# Patient Record
Sex: MALE | Race: WHITE | NOT HISPANIC OR LATINO | Employment: FULL TIME | URBAN - METROPOLITAN AREA
[De-identification: names, ages, dates, MRNs, and addresses within clinical notes are randomized per-mention and may not be internally consistent; named-entity substitution may affect disease eponyms.]

---

## 2017-02-03 ENCOUNTER — APPOINTMENT (OUTPATIENT)
Dept: LAB | Facility: CLINIC | Age: 52
End: 2017-02-03
Payer: COMMERCIAL

## 2017-02-03 ENCOUNTER — TRANSCRIBE ORDERS (OUTPATIENT)
Dept: LAB | Facility: CLINIC | Age: 52
End: 2017-02-03

## 2017-02-03 DIAGNOSIS — IMO0001 MODY 4, UNCOMPLICATED, UNCONTROLLED: Primary | ICD-10-CM

## 2017-02-03 DIAGNOSIS — Z00.00 ROUTINE GENERAL MEDICAL EXAMINATION AT A HEALTH CARE FACILITY: ICD-10-CM

## 2017-02-03 DIAGNOSIS — I10 ESSENTIAL HYPERTENSION, BENIGN: ICD-10-CM

## 2017-02-03 DIAGNOSIS — E78.5 HYPERLIPIDEMIA, UNSPECIFIED HYPERLIPIDEMIA TYPE: ICD-10-CM

## 2017-02-03 LAB
ALBUMIN SERPL BCP-MCNC: 4 G/DL (ref 3.5–5)
ALP SERPL-CCNC: 81 U/L (ref 46–116)
ALT SERPL W P-5'-P-CCNC: 54 U/L (ref 12–78)
ANION GAP SERPL CALCULATED.3IONS-SCNC: 8 MMOL/L (ref 4–13)
AST SERPL W P-5'-P-CCNC: 20 U/L (ref 5–45)
BILIRUB SERPL-MCNC: 1.07 MG/DL (ref 0.2–1)
BUN SERPL-MCNC: 12 MG/DL (ref 5–25)
CALCIUM SERPL-MCNC: 9.1 MG/DL (ref 8.3–10.1)
CHLORIDE SERPL-SCNC: 104 MMOL/L (ref 100–108)
CHOLEST SERPL-MCNC: 167 MG/DL (ref 50–200)
CO2 SERPL-SCNC: 30 MMOL/L (ref 21–32)
CREAT SERPL-MCNC: 1.09 MG/DL (ref 0.6–1.3)
EST. AVERAGE GLUCOSE BLD GHB EST-MCNC: 166 MG/DL
GFR SERPL CREATININE-BSD FRML MDRD: >60 ML/MIN/1.73SQ M
GLUCOSE SERPL-MCNC: 238 MG/DL (ref 65–140)
HBA1C MFR BLD: 7.4 % (ref 4.2–6.3)
HDLC SERPL-MCNC: 43 MG/DL (ref 40–60)
LDLC SERPL CALC-MCNC: 97 MG/DL (ref 0–100)
POTASSIUM SERPL-SCNC: 4.5 MMOL/L (ref 3.5–5.3)
PROT SERPL-MCNC: 7.5 G/DL (ref 6.4–8.2)
PSA SERPL-MCNC: 1.6 NG/ML (ref 0–4)
SODIUM SERPL-SCNC: 142 MMOL/L (ref 136–145)
TRIGL SERPL-MCNC: 137 MG/DL

## 2017-02-03 PROCEDURE — 83036 HEMOGLOBIN GLYCOSYLATED A1C: CPT

## 2017-02-03 PROCEDURE — 80053 COMPREHEN METABOLIC PANEL: CPT

## 2017-02-03 PROCEDURE — 36415 COLL VENOUS BLD VENIPUNCTURE: CPT

## 2017-02-03 PROCEDURE — 80061 LIPID PANEL: CPT

## 2017-02-03 PROCEDURE — G0103 PSA SCREENING: HCPCS

## 2017-05-23 ENCOUNTER — HOSPITAL ENCOUNTER (OUTPATIENT)
Dept: RADIOLOGY | Facility: CLINIC | Age: 52
Discharge: HOME | End: 2017-05-23
Payer: COMMERCIAL

## 2017-05-23 ENCOUNTER — TRANSCRIBE ORDERS (OUTPATIENT)
Dept: URGENT CARE | Facility: CLINIC | Age: 52
End: 2017-05-23

## 2017-05-23 DIAGNOSIS — M25.571 PAIN IN RIGHT ANKLE: ICD-10-CM

## 2017-05-23 DIAGNOSIS — M79.671 PAIN IN RIGHT FOOT: ICD-10-CM

## 2017-05-23 DIAGNOSIS — S99.911A ANKLE INJURIES, RIGHT, INITIAL ENCOUNTER: Primary | ICD-10-CM

## 2017-05-23 PROCEDURE — 73610 X-RAY EXAM OF ANKLE: CPT

## 2017-05-23 PROCEDURE — 73630 X-RAY EXAM OF FOOT: CPT

## 2020-05-12 LAB — HBA1C MFR BLD HPLC: 11.4 %

## 2020-06-16 ENCOUNTER — TELEPHONE (OUTPATIENT)
Dept: ENDOCRINOLOGY | Facility: CLINIC | Age: 55
End: 2020-06-16

## 2020-06-17 ENCOUNTER — TELEPHONE (OUTPATIENT)
Dept: ENDOCRINOLOGY | Facility: CLINIC | Age: 55
End: 2020-06-17

## 2020-06-17 ENCOUNTER — OFFICE VISIT (OUTPATIENT)
Dept: ENDOCRINOLOGY | Facility: CLINIC | Age: 55
End: 2020-06-17
Payer: COMMERCIAL

## 2020-06-17 VITALS
HEART RATE: 92 BPM | HEIGHT: 71 IN | SYSTOLIC BLOOD PRESSURE: 140 MMHG | WEIGHT: 226 LBS | TEMPERATURE: 98 F | DIASTOLIC BLOOD PRESSURE: 70 MMHG | BODY MASS INDEX: 31.64 KG/M2

## 2020-06-17 DIAGNOSIS — E11.65 TYPE 2 DIABETES MELLITUS WITH HYPERGLYCEMIA, WITHOUT LONG-TERM CURRENT USE OF INSULIN (HCC): Primary | ICD-10-CM

## 2020-06-17 DIAGNOSIS — E11.65 TYPE 2 DIABETES MELLITUS WITH HYPERGLYCEMIA, WITHOUT LONG-TERM CURRENT USE OF INSULIN (HCC): ICD-10-CM

## 2020-06-17 DIAGNOSIS — E11.65 TYPE 2 DIABETES MELLITUS WITH HYPERGLYCEMIA, WITH LONG-TERM CURRENT USE OF INSULIN (HCC): ICD-10-CM

## 2020-06-17 DIAGNOSIS — E78.2 MIXED HYPERLIPIDEMIA: ICD-10-CM

## 2020-06-17 DIAGNOSIS — E78.2 MIXED HYPERLIPIDEMIA: Primary | ICD-10-CM

## 2020-06-17 DIAGNOSIS — I10 HYPERTENSION GOAL BP (BLOOD PRESSURE) < 140/90: ICD-10-CM

## 2020-06-17 DIAGNOSIS — Z79.4 TYPE 2 DIABETES MELLITUS WITH HYPERGLYCEMIA, WITH LONG-TERM CURRENT USE OF INSULIN (HCC): ICD-10-CM

## 2020-06-17 LAB
SL AMB POCT GLUCOSE BLD: 330
SL AMB POCT HEMOGLOBIN AIC: 330 (ref ?–6.5)

## 2020-06-17 PROCEDURE — 99204 OFFICE O/P NEW MOD 45 MIN: CPT | Performed by: INTERNAL MEDICINE

## 2020-06-17 PROCEDURE — 82948 REAGENT STRIP/BLOOD GLUCOSE: CPT | Performed by: INTERNAL MEDICINE

## 2020-06-17 PROCEDURE — 83036 HEMOGLOBIN GLYCOSYLATED A1C: CPT | Performed by: INTERNAL MEDICINE

## 2020-06-17 RX ORDER — LORATADINE 10 MG/1
10 TABLET ORAL DAILY
COMMUNITY

## 2020-06-17 RX ORDER — GLIMEPIRIDE 4 MG/1
4 TABLET ORAL
Qty: 90 TABLET | Refills: 3 | Status: SHIPPED | OUTPATIENT
Start: 2020-06-17 | End: 2021-03-31

## 2020-06-17 RX ORDER — ALPRAZOLAM 0.5 MG/1
0.5 TABLET, EXTENDED RELEASE ORAL DAILY
Qty: 30 TABLET | Refills: 0 | Status: CANCELLED
Start: 2020-06-17

## 2020-06-17 RX ORDER — LISINOPRIL AND HYDROCHLOROTHIAZIDE 12.5; 1 MG/1; MG/1
1 TABLET ORAL DAILY
COMMUNITY

## 2020-06-17 RX ORDER — FAMOTIDINE 20 MG/1
20 TABLET, FILM COATED ORAL AS NEEDED
COMMUNITY

## 2020-06-17 RX ORDER — ROSUVASTATIN CALCIUM 10 MG/1
10 TABLET, COATED ORAL DAILY
COMMUNITY

## 2020-06-17 RX ORDER — PIOGLITAZONE AND GLIMEPIRIDE 30; 4 MG/1; MG/1
1 TABLET ORAL DAILY
COMMUNITY
End: 2020-06-17

## 2020-06-30 ENCOUNTER — TELEPHONE (OUTPATIENT)
Dept: ENDOCRINOLOGY | Facility: CLINIC | Age: 55
End: 2020-06-30

## 2020-07-06 ENCOUNTER — TELEPHONE (OUTPATIENT)
Dept: ENDOCRINOLOGY | Facility: CLINIC | Age: 55
End: 2020-07-06

## 2020-07-06 DIAGNOSIS — E11.65 TYPE 2 DIABETES MELLITUS WITH HYPERGLYCEMIA, WITH LONG-TERM CURRENT USE OF INSULIN (HCC): ICD-10-CM

## 2020-07-06 DIAGNOSIS — E11.9 TYPE 2 DIABETES MELLITUS WITHOUT COMPLICATION, WITHOUT LONG-TERM CURRENT USE OF INSULIN (HCC): Primary | ICD-10-CM

## 2020-07-06 DIAGNOSIS — Z79.4 TYPE 2 DIABETES MELLITUS WITH HYPERGLYCEMIA, WITH LONG-TERM CURRENT USE OF INSULIN (HCC): ICD-10-CM

## 2020-07-06 RX ORDER — LANCETS
EACH MISCELLANEOUS
Qty: 102 EACH | Refills: 3 | Status: SHIPPED | OUTPATIENT
Start: 2020-07-06 | End: 2020-07-30 | Stop reason: SDUPTHER

## 2020-07-06 RX ORDER — LANCETS
EACH MISCELLANEOUS
Qty: 102 EACH | Refills: 3 | Status: SHIPPED | OUTPATIENT
Start: 2020-07-06 | End: 2020-07-06 | Stop reason: SDUPTHER

## 2020-07-06 NOTE — TELEPHONE ENCOUNTER
[employer] sent a list of drugs to the patient that would be covered in place of Ozempic:    Metformin, metformin ER, glipizide-metformin, glyburide-metformin, pioglitazone-metformin

## 2020-07-07 ENCOUNTER — TELEPHONE (OUTPATIENT)
Dept: ENDOCRINOLOGY | Facility: CLINIC | Age: 55
End: 2020-07-07

## 2020-07-08 ENCOUNTER — TELEPHONE (OUTPATIENT)
Dept: ENDOCRINOLOGY | Facility: CLINIC | Age: 55
End: 2020-07-08

## 2020-07-08 NOTE — TELEPHONE ENCOUNTER
Received fax from [address] [place] [address] has been denied  (placed letter on Dr Shiva Rodriguez desk)  Letter states pt has to have tried or can not try the following medications:    Metformin  Metformin extended release  Glipizide-Metformin  Glyburide-Metformin  Pioglitazone-Metformin      Called pt to let him know the above, he stated he received the notification about the denial, advised I will let Dr Chirag Sage know  He stated that he received his mail order of Ozempic on 7/8/20 for a 3 month supply  He wants to know if Dr Chirag Sage wants him to finish the Ozempic before starting on any of the above medications?

## 2020-07-08 NOTE — TELEPHONE ENCOUNTER
Called and left pt message to finish the Ozempic, when he is out to call and let Dr Javier Vilchis know

## 2020-07-15 ENCOUNTER — TELEPHONE (OUTPATIENT)
Dept: ENDOCRINOLOGY | Facility: CLINIC | Age: 55
End: 2020-07-15

## 2020-07-15 DIAGNOSIS — E11.65 TYPE 2 DIABETES MELLITUS WITH HYPERGLYCEMIA, WITH LONG-TERM CURRENT USE OF INSULIN (HCC): ICD-10-CM

## 2020-07-15 DIAGNOSIS — Z79.4 TYPE 2 DIABETES MELLITUS WITH HYPERGLYCEMIA, WITH LONG-TERM CURRENT USE OF INSULIN (HCC): ICD-10-CM

## 2020-07-15 DIAGNOSIS — E11.65 TYPE 2 DIABETES MELLITUS WITH HYPERGLYCEMIA, WITH LONG-TERM CURRENT USE OF INSULIN (HCC): Primary | ICD-10-CM

## 2020-07-15 DIAGNOSIS — Z79.4 TYPE 2 DIABETES MELLITUS WITH HYPERGLYCEMIA, WITH LONG-TERM CURRENT USE OF INSULIN (HCC): Primary | ICD-10-CM

## 2020-07-15 NOTE — TELEPHONE ENCOUNTER
Please let patient know I reviewed BG log    -BGs look much better    Occasionally having a high reading in the morning     Increase lantus to 22 units at bedtime, continue glimepiride 4mg once a day and Ozempic 0 5mg weekly     I ordered labs to be done a few days before his next appointment   He had slightly elevated liver enzymes in May 2020-- will repeat this, if they are normal now will start metformin at his next appointment which will help with the morning blood sugar reading as well as allow for the Ozempic to be covered in the future if his A1C is not at goal

## 2020-07-23 LAB
CREAT ?TM UR-SCNC: 239.6 UMOL/L
EXT MICROALBUMIN URINE RANDOM: 34.3
MICROALBUMIN/CREAT UR: 14 MG/G{CREAT}

## 2020-07-25 LAB
ALBUMIN SERPL-MCNC: 4.7 G/DL (ref 3.8–4.9)
ALBUMIN/CREAT UR: 14 MG/G CREAT (ref 0–29)
ALBUMIN/GLOB SERPL: 2.5 {RATIO} (ref 1.2–2.2)
ALP SERPL-CCNC: 76 IU/L (ref 39–117)
ALT SERPL-CCNC: 117 IU/L (ref 0–44)
AST SERPL-CCNC: 73 IU/L (ref 0–40)
BILIRUB SERPL-MCNC: 1.4 MG/DL (ref 0–1.2)
BUN SERPL-MCNC: 11 MG/DL (ref 6–24)
BUN/CREAT SERPL: 11 (ref 9–20)
CALCIUM SERPL-MCNC: 9.5 MG/DL (ref 8.7–10.2)
CHLORIDE SERPL-SCNC: 97 MMOL/L (ref 96–106)
CHOLEST SERPL-MCNC: 137 MG/DL (ref 100–199)
CO2 SERPL-SCNC: 23 MMOL/L (ref 20–29)
CREAT SERPL-MCNC: 0.97 MG/DL (ref 0.76–1.27)
CREAT UR-MCNC: 239.6 MG/DL
GLOBULIN SER-MCNC: 1.9 G/DL (ref 1.5–4.5)
GLUCOSE SERPL-MCNC: 219 MG/DL (ref 65–99)
HDLC SERPL-MCNC: 42 MG/DL
LDLC SERPL CALC-MCNC: 75 MG/DL (ref 0–99)
MICROALBUMIN UR-MCNC: 34.3 UG/ML
POTASSIUM SERPL-SCNC: 4.6 MMOL/L (ref 3.5–5.2)
PROT SERPL-MCNC: 6.6 G/DL (ref 6–8.5)
SL AMB EGFR AFRICAN AMERICAN: 103 ML/MIN/1.73
SL AMB EGFR NON AFRICAN AMERICAN: 89 ML/MIN/1.73
SL AMB VLDL CHOLESTEROL CALC: 20 MG/DL (ref 5–40)
SODIUM SERPL-SCNC: 138 MMOL/L (ref 134–144)
TRIGL SERPL-MCNC: 99 MG/DL (ref 0–149)

## 2020-07-29 ENCOUNTER — OFFICE VISIT (OUTPATIENT)
Dept: ENDOCRINOLOGY | Facility: CLINIC | Age: 55
End: 2020-07-29
Payer: COMMERCIAL

## 2020-07-29 VITALS
HEART RATE: 79 BPM | WEIGHT: 224 LBS | BODY MASS INDEX: 31.36 KG/M2 | TEMPERATURE: 98 F | HEIGHT: 71 IN | DIASTOLIC BLOOD PRESSURE: 78 MMHG | SYSTOLIC BLOOD PRESSURE: 140 MMHG

## 2020-07-29 DIAGNOSIS — I10 HYPERTENSION GOAL BP (BLOOD PRESSURE) < 140/90: ICD-10-CM

## 2020-07-29 DIAGNOSIS — Z79.4 TYPE 2 DIABETES MELLITUS WITH HYPERGLYCEMIA, WITH LONG-TERM CURRENT USE OF INSULIN (HCC): Primary | ICD-10-CM

## 2020-07-29 DIAGNOSIS — E11.65 TYPE 2 DIABETES MELLITUS WITH HYPERGLYCEMIA, WITH LONG-TERM CURRENT USE OF INSULIN (HCC): Primary | ICD-10-CM

## 2020-07-29 DIAGNOSIS — R79.89 ELEVATED LFTS: ICD-10-CM

## 2020-07-29 DIAGNOSIS — E78.2 MIXED HYPERLIPIDEMIA: ICD-10-CM

## 2020-07-29 PROCEDURE — 99214 OFFICE O/P EST MOD 30 MIN: CPT | Performed by: INTERNAL MEDICINE

## 2020-07-29 NOTE — PROGRESS NOTES
ENDOCRINOLOGY  FOLLOW UP VISIT      Reason for Endocrine Consult/Chief Complaint: DM management     Referring Provider: Si Sandifer, MD        ?  Medical Decision Making:     Impression  1  Type 2 Diabetes uncontrolled  2  HTN  3  HLD mixed  4  Elevated LFTs         Recommendations:  ?  Reported BGs have improved significantly, still having some fasting hypergylcemia, will increase lantus to 25 units qHS, continue glimepiride 4mg qday and Ozempic 0 5mg weekly  Insurance will cover Ozempic if he is on metformin as well  Once GI sees him for the elevated LFTs and if they are okay with starting it will prescribe low dose metformin        HTN- controlled on ACEi-HCTZ continue     HLD- LDL improved to 75 July 2020, continue crestor 10mg every other day    Elevated LFTs-  persistent AST 73 and  July 2020--will refer to GI for further evaluation before starting metformin     RTC 3 months   [facility]           History of Present Illness:   Mr Miki Bennett is a 49year old male who presents for diabetes evaluation-follow up      [facility] was managing diabetes before this        ?  PMH-DM2, HTN, HLD   PSH-appendectomy   FHx-mother with diabetes and MGM with diabetes  SHx-prior smoker, neg x 2, works [employer]  (3pm-11PM shift)     Type of DM: 2  Age of onset: 5 years ago   Microvascular complications: none known   Macrovascular complications:none known       Events since last visit:   on lantus 22 units qHS, glimepiride 4mg qday and Ozempic 0 5mg weekly on fridays     Forgot meter today    FBG-150-180 reported  Premeal/qHS BG-high 100s   Hypoglycemia- none   ? Review of Systems:     Review of Systems   Constitutional: Negative for appetite change, chills, diaphoresis, fatigue, fever and unexpected weight change  HENT: Negative for congestion, ear pain, hearing loss, rhinorrhea, sinus pressure, sinus pain, sore throat, trouble swallowing and voice change      Eyes: Negative for photophobia, redness and visual disturbance  Respiratory: Negative for apnea, cough, chest tightness, shortness of breath, wheezing and stridor  Cardiovascular: Negative for chest pain, palpitations and leg swelling  Gastrointestinal: Negative for abdominal distention, abdominal pain, constipation, diarrhea, nausea and vomiting  Endocrine: Negative for cold intolerance, heat intolerance, polydipsia, polyphagia and polyuria  Genitourinary: Negative for difficulty urinating, dysuria, flank pain, frequency, hematuria and urgency  Musculoskeletal: Negative for arthralgias, back pain, gait problem, joint swelling and myalgias  Skin: Negative for color change, pallor, rash and wound  Allergic/Immunologic: Negative for immunocompromised state  Neurological: Negative for dizziness, tremors, syncope, weakness, light-headedness and headaches  Hematological: Negative for adenopathy  Does not bruise/bleed easily  Psychiatric/Behavioral: Negative for confusion and sleep disturbance  The patient is not nervous/anxious  ? Patient History:     Past Medical History:   Diagnosis Date    Diabetes type 2, uncontrolled ([facility] )      History reviewed  No pertinent surgical history    Social History     Socioeconomic History    Marital status:      Spouse name: Not on file    Number of children: Not on file    Years of education: Not on file    Highest education level: Not on file   Occupational History    Not on file   Social Needs    Financial resource strain: Not on file    Food insecurity:     Worry: Not on file     Inability: Not on file    Transportation needs:     Medical: Not on file     Non-medical: Not on file   Tobacco Use    Smoking status: Former Smoker    Smokeless tobacco: Never Used   Substance and Sexual Activity    Alcohol use: Yes     Comment: socially    Drug use: Never    Sexual activity: Not on file   Lifestyle    Physical activity:     Days per week: Not on file Minutes per session: Not on file    Stress: Not on file   Relationships    Social connections:     Talks on phone: Not on file     Gets together: Not on file     Attends [employer] service: Not on file     Active member of club or organization: Not on file     Attends meetings of clubs or organizations: Not on file     Relationship status: Not on file    Intimate partner violence:     Fear of current or ex partner: Not on file     Emotionally abused: Not on file     Physically abused: Not on file     Forced sexual activity: Not on file   Other Topics Concern    Not on file   Social History Narrative    Not on file     Family History   Problem Relation Age of Onset    Diabetes type II Mother     Hypertension Father     Diabetes type II Sister        Current Medications: At the time this note was written these were the medications the patient was on    Current Outpatient Medications   Medication Sig Dispense Refill    Accu-Chek FastClix Lancets MISC Use 3 lancets a day to check blood sugar 102 each 3    glimepiride (AMARYL) 4 mg tablet Take 1 tablet (4 mg total) by mouth daily with breakfast 90 tablet 3    glucose blood (Accu-Chek Guide) test strip Use 3 test strips a day to check blood sugar 200 each 2    insulin glargine (Lantus SoloStar) 100 units/mL injection pen Inject 22 Units under the skin daily at bedtime 5 pen 3    Insulin Pen Needle (BD Pen Needle Melany U/F) 32G X 4 MM MISC Use 1 pen needle a day to administer insulin under the skin 100 each 3    lisinopril-hydrochlorothiazide (PRINZIDE,ZESTORETIC) 10-12 5 MG per tablet Take 1 tablet by mouth daily      loratadine (CLARITIN) 10 mg tablet Take 10 mg by mouth daily      rosuvastatin (CRESTOR) 10 MG tablet Take 10 mg by mouth daily      Semaglutide,0 25 or 0 5MG/DOS, 2 MG/1 5ML SOPN Inject under the skin      famotidine (PEPCID) 20 mg tablet Take 20 mg by mouth 2 (two) times a day       No current facility-administered medications for this visit  Allergies: Bydureon [exenatide]    Physical Exam:   Vital Signs:   /78   Pulse 79   Temp 98 °F (36 7 °C)   Ht 5' 11" (1 803 m)   Wt 102 kg (224 lb)   BMI 31 24 kg/m²     Physical Exam   Constitutional: He is oriented to person, place, and time  He appears well-developed and well-nourished  HENT:   Head: Normocephalic and atraumatic  Right Ear: External ear normal    Left Ear: External ear normal    Nose: Nose normal    Eyes: Pupils are equal, round, and reactive to light  Conjunctivae and EOM are normal  No scleral icterus  Neck: Normal range of motion  Neck supple  No thyromegaly present  Cardiovascular: Normal rate, regular rhythm and normal heart sounds  Exam reveals no gallop and no friction rub  No murmur heard  Pulmonary/Chest: Effort normal and breath sounds normal  No stridor  No respiratory distress  He has no wheezes  He has no rales  Abdominal: Soft  Bowel sounds are normal  He exhibits no distension and no mass  There is no tenderness  There is no rebound and no guarding  Musculoskeletal: Normal range of motion  He exhibits no edema  Lymphadenopathy:     He has no cervical adenopathy  Neurological: He is alert and oriented to person, place, and time  Skin: Skin is warm and dry  No rash noted  No erythema  No pallor  Psychiatric: He has a normal mood and affect   His behavior is normal  Judgment and thought content normal           Labs and Imaging:      Component      Latest Ref Rng & Units 7/23/2020   Glucose, Random      65 - 99 mg/dL 219 (H)   BUN      6 - 24 mg/dL 11   Creatinine      0 76 - 1 27 mg/dL 0 97   eGFR Non       >59 mL/min/1 73 89   eGFR       >59 mL/min/1 73 103   SL AMB BUN/CREATININE RATIO      9 - 20 11   Sodium      134 - 144 mmol/L 138   Potassium      3 5 - 5 2 mmol/L 4 6   Chloride      96 - 106 mmol/L 97   CO2      20 - 29 mmol/L 23   CALCIUM      8 7 - 10 2 mg/dL 9 5   Total Protein      6 0 - 8 5 g/dL 6 6   Albumin      3 8 - 4 9 g/dL 4 7   Globulin, Total      1 5 - 4 5 g/dL 1 9   Albumin/Globulin Ratio      1 2 - 2 2 2 5 (H)   TOTAL BILIRUBIN      0 0 - 1 2 mg/dL 1 4 (H)   ALKALINE PHOSPHATASE ISOENZYMES      39 - 117 IU/L 76   AST      0 - 40 IU/L 73 (H)   ALT      0 - 44 IU/L 117 (H)   Cholesterol      100 - 199 mg/dL 137   Triglycerides      0 - 149 mg/dL 99   HDL      >39 mg/dL 42   VLDL Cholesterol Dave      5 - 40 mg/dL 20   LDL Calculated      0 - 99 mg/dL 75   EXT Creatinine Urine      Not Estab  mg/dL 239 6   MICROALBUM ,U,RANDOM      Not Estab  ug/mL 34 3   MICROALBUMIN/CREATININE RATIO      0 - 29 mg/g creat 14

## 2020-07-29 NOTE — PATIENT INSTRUCTIONS
Increase lantus to 25 units at bedtime    Continue same dose for glimepiride and Ozempic     See GI liver doctor    Have labs done in 3 months    Follow up in 3 months

## 2020-07-30 ENCOUNTER — TELEPHONE (OUTPATIENT)
Dept: ENDOCRINOLOGY | Facility: CLINIC | Age: 55
End: 2020-07-30

## 2020-07-30 DIAGNOSIS — Z79.4 TYPE 2 DIABETES MELLITUS WITH HYPERGLYCEMIA, WITH LONG-TERM CURRENT USE OF INSULIN (HCC): ICD-10-CM

## 2020-07-30 DIAGNOSIS — E11.65 TYPE 2 DIABETES MELLITUS WITH HYPERGLYCEMIA, WITH LONG-TERM CURRENT USE OF INSULIN (HCC): ICD-10-CM

## 2020-07-30 RX ORDER — LANCETS
EACH MISCELLANEOUS
Qty: 204 EACH | Refills: 3 | Status: SHIPPED | OUTPATIENT
Start: 2020-07-30 | End: 2021-02-08 | Stop reason: SDUPTHER

## 2020-07-30 NOTE — TELEPHONE ENCOUNTER
Spoke with patient who asked that we cancel the prescription for Accu-chek fastclix lancets at [facility]    Only able to get through [facility]

## 2020-08-11 ENCOUNTER — TELEPHONE (OUTPATIENT)
Dept: ENDOCRINOLOGY | Facility: CLINIC | Age: 55
End: 2020-08-11

## 2020-08-11 DIAGNOSIS — E11.65 TYPE 2 DIABETES MELLITUS WITH HYPERGLYCEMIA, WITH LONG-TERM CURRENT USE OF INSULIN (HCC): ICD-10-CM

## 2020-08-11 DIAGNOSIS — Z79.4 TYPE 2 DIABETES MELLITUS WITH HYPERGLYCEMIA, WITH LONG-TERM CURRENT USE OF INSULIN (HCC): ICD-10-CM

## 2020-08-11 NOTE — TELEPHONE ENCOUNTER
Please let patient know I reviewed BG log    -daytime BGs are good, morning BGs sometimes are mildly elevated    increase lantus to 28 units qHS, continue glimepiride 4mg qday and continue Ozempic 0 5mg weekly

## 2020-08-17 RX ORDER — INSULIN GLARGINE 100 [IU]/ML
28 INJECTION, SOLUTION SUBCUTANEOUS
Qty: 5 PEN | Refills: 3
Start: 2020-08-17 | End: 2020-08-27 | Stop reason: SDUPTHER

## 2020-08-27 ENCOUNTER — TELEPHONE (OUTPATIENT)
Dept: ENDOCRINOLOGY | Facility: CLINIC | Age: 55
End: 2020-08-27

## 2020-08-27 DIAGNOSIS — E11.65 TYPE 2 DIABETES MELLITUS WITH HYPERGLYCEMIA, WITH LONG-TERM CURRENT USE OF INSULIN (HCC): ICD-10-CM

## 2020-08-27 DIAGNOSIS — Z79.4 TYPE 2 DIABETES MELLITUS WITH HYPERGLYCEMIA, WITH LONG-TERM CURRENT USE OF INSULIN (HCC): ICD-10-CM

## 2020-08-27 RX ORDER — INSULIN GLARGINE 100 [IU]/ML
33 INJECTION, SOLUTION SUBCUTANEOUS
Qty: 5 PEN | Refills: 3
Start: 2020-08-27 | End: 2020-10-27 | Stop reason: SDUPTHER

## 2020-08-27 NOTE — TELEPHONE ENCOUNTER
Spoke to pt, BG log reviewed  Increase Lantus to 33 units at bedtime  Continue glimepiride and ozempic at same doses  Patient understood

## 2020-08-27 NOTE — TELEPHONE ENCOUNTER
Please let pt know I reviewed BG log    -morning BGs are high but the evening BG is better    Increase lantus to 33 units at bedtime, continue same doses of glimepiride and ozempic

## 2020-09-01 ENCOUNTER — CONSULT (OUTPATIENT)
Dept: GASTROENTEROLOGY | Facility: CLINIC | Age: 55
End: 2020-09-01
Payer: COMMERCIAL

## 2020-09-01 VITALS
HEIGHT: 71 IN | RESPIRATION RATE: 18 BRPM | HEART RATE: 80 BPM | BODY MASS INDEX: 30.24 KG/M2 | TEMPERATURE: 97.6 F | WEIGHT: 216 LBS

## 2020-09-01 DIAGNOSIS — E66.09 CLASS 1 OBESITY DUE TO EXCESS CALORIES WITH SERIOUS COMORBIDITY AND BODY MASS INDEX (BMI) OF 30.0 TO 30.9 IN ADULT: Primary | ICD-10-CM

## 2020-09-01 DIAGNOSIS — R79.89 ELEVATED LFTS: ICD-10-CM

## 2020-09-01 PROBLEM — I10 ESSENTIAL HYPERTENSION: Status: ACTIVE | Noted: 2020-09-01

## 2020-09-01 PROBLEM — E11.9 TYPE 2 DIABETES MELLITUS WITHOUT COMPLICATION, WITHOUT LONG-TERM CURRENT USE OF INSULIN (HCC): Status: ACTIVE | Noted: 2020-09-01

## 2020-09-01 PROBLEM — E78.5 HYPERLIPIDEMIA: Status: ACTIVE | Noted: 2020-09-01

## 2020-09-01 PROCEDURE — 99203 OFFICE O/P NEW LOW 30 MIN: CPT | Performed by: INTERNAL MEDICINE

## 2020-09-01 NOTE — PATIENT INSTRUCTIONS
Elevated liver chemistries  - have blood drawn to check labs for chronic liver disease  - schedule right upper quadrant ultrasound  Please call central scheduling at [phone] to schedule your radiology exam     Weight loss  - continue lifestyle changes to reduce weight: decreasing carbohydrates/starchy foods, decreasing sugary drinks, limiting portion, and eating more fruits and veggies; encourage 30 minutes of exercise 5-6x per week       Follow up in 3 months

## 2020-09-01 NOTE — PROGRESS NOTES
[facility] - Outpatient Consultation  Sebastian Schuster 49 y o  male MRN: [id number]  Encounter: [id number]          ASSESSMENT AND PLAN:      1  Elevated LFTs  - have blood drawn to check labs for chronic liver disease  - schedule right upper quadrant ultrasound  2  Obesity  - continue lifestyle changes to reduce weight    3  Co-morbidities: HTN, HL, DM      Follow up in 3 months    ______________________________________________________________________    HPI:  52-year-old man who comes in to discuss elevated liver chemistries  Co-morbidities: HTN, HL, DM    Elevated liver chemistries  - AST 73, , Tbili 1 4  - no new medications except for insulin; occasionally uses OTC meds for heartburn and allergies such as TUMS, prilosec, claritin  - no personal or family history of liver disease  - no travel outside of [place], no blood transfusion, no IVDU  - +tattoos but all done in a tattoo studio  - 1-2 beers per week at most    ROS:   +rare lower extremity edema  No jaundice, abd swelling,  confusion, pruritus  Cologuard 2018 negative  No family history of colon cancer      REVIEW OF SYSTEMS:    CONSTITUTIONAL: Denies any fever, chills, rigors, and weight loss  HEENT: No earache or tinnitus  Denies hearing loss or visual disturbances  CARDIOVASCULAR: No chest pain or palpitations  RESPIRATORY: Denies any cough, hemoptysis, shortness of breath or dyspnea on exertion  GASTROINTESTINAL: As noted in the History of Present Illness  GENITOURINARY: No problems with urination  Denies any hematuria or dysuria  NEUROLOGIC: No dizziness or vertigo, denies headaches  MUSCULOSKELETAL: Denies any muscle or joint pain  SKIN: Denies skin rashes or itching  ENDOCRINE: Denies excessive thirst  Denies intolerance to heat or cold  PSYCHOSOCIAL: Denies depression or anxiety  Denies any recent memory loss         Historical Information   Past Medical History:   Diagnosis Date    Diabetes type 2, uncontrolled ([facility] )      No past surgical history on file  Social History   Social History     Substance and Sexual Activity   Alcohol Use Yes    Comment: socially     Social History     Substance and Sexual Activity   Drug Use Never     Social History     Tobacco Use   Smoking Status Former Smoker   Smokeless Tobacco Never Used     Family History   Problem Relation Age of Onset    Diabetes type II Mother     Hypertension Father     Diabetes type II Sister        Meds/Allergies       Current Outpatient Medications:     Accu-Chek FastClix Lancets MISC    famotidine (PEPCID) 20 mg tablet    glimepiride (AMARYL) 4 mg tablet    glucose blood (Accu-Chek Guide) test strip    insulin glargine (Lantus SoloStar) 100 units/mL injection pen    Insulin Pen Needle (BD Pen Needle Melany U/F) 32G X 4 MM MISC    lisinopril-hydrochlorothiazide (PRINZIDE,ZESTORETIC) 10-12 5 MG per tablet    loratadine (CLARITIN) 10 mg tablet    rosuvastatin (CRESTOR) 10 MG tablet    Semaglutide,0 25 or 0 5MG/DOS, 2 MG/1 5ML SOPN    Allergies   Allergen Reactions    Bydureon [Exenatide]            Objective     Pulse 80, temperature 97 6 °F (36 4 °C), temperature source Tympanic, resp  rate 18, height 5' 11" (1 803 m), weight 98 kg (216 lb)  Body mass index is 30 13 kg/m²  PHYSICAL EXAM:      General Appearance:   Alert, cooperative, no distress   HEENT:   Normocephalic, atraumatic, anicteric  Neck:  Supple, symmetrical, trachea midline   Lungs:   Clear to auscultation bilaterally; no rales, rhonchi or wheezing; respirations unlabored    Heart[de-identified]   Regular rate and rhythm; no murmur, rub, or gallop     Abdomen:   Soft, non-tender, non-distended; normal bowel sounds; no masses, no organomegaly    Rectal:   Deferred   Neuro:   Alert, oriented, no gross deficits, normal strength and tone   Extremities:  No cyanosis, clubbing or edema    Psych:  Normal mood and affect    Skin:  No jaundice, rashes, or lesions    Lymph nodes: No palpable cervical lymphadenopathy        Lab Results:   No visits with results within 1 Day(s) from this visit  Latest known visit with results is:   Orders Only on 07/23/2020   Component Date Value    Glucose, Random 07/23/2020 219*    BUN 07/23/2020 11     Creatinine 07/23/2020 0 97     eGFR Non  07/23/2020 89     eGFR  07/23/2020 103     SL AMB BUN/CREATININE RA* 07/23/2020 11     Sodium 07/23/2020 138     Potassium 07/23/2020 4 6     Chloride 07/23/2020 97     CO2 07/23/2020 23     CALCIUM 07/23/2020 9 5     Protein, Total 07/23/2020 6 6     Albumin 07/23/2020 4 7     Globulin, Total 07/23/2020 1 9     Albumin/Globulin Ratio 07/23/2020 2 5*    TOTAL BILIRUBIN 07/23/2020 1 4*    Alk Phos Isoenzymes 07/23/2020 76     AST 07/23/2020 73*    ALT 07/23/2020 117*    Cholesterol, Total 07/23/2020 137     Triglycerides 07/23/2020 99     HDL 07/23/2020 42     VLDL Cholesterol Calcula* 07/23/2020 20     LDL Calculated 07/23/2020 75     Creatinine, Urine 07/23/2020 239 6     Microalbum  ,U,Random 07/23/2020 34 3     Microalb/Creat Ratio 07/23/2020 14          Radiology Results:   No results found      Endoscopies:

## 2020-09-09 ENCOUNTER — TELEPHONE (OUTPATIENT)
Dept: ENDOCRINOLOGY | Facility: CLINIC | Age: 55
End: 2020-09-09

## 2020-09-09 LAB
A1AT PHENOTYP SERPL IFE: NORMAL
A1AT SERPL-MCNC: 133 MG/DL (ref 101–187)
ACTIN IGG SERPL-ACNC: 5 UNITS (ref 0–19)
ALBUMIN SERPL-MCNC: 4.7 G/DL (ref 3.8–4.9)
ALP SERPL-CCNC: 80 IU/L (ref 39–117)
ALT SERPL-CCNC: 98 IU/L (ref 0–44)
ANA TITR SER IF: NEGATIVE {TITER}
AST SERPL-CCNC: 56 IU/L (ref 0–40)
BILIRUB DIRECT SERPL-MCNC: 0.31 MG/DL (ref 0–0.4)
BILIRUB SERPL-MCNC: 1.4 MG/DL (ref 0–1.2)
CERULOPLASMIN SERPL-MCNC: 21.9 MG/DL (ref 16–31)
FERRITIN SERPL-MCNC: 946 NG/ML (ref 30–400)
HAV IGM SERPL QL IA: NEGATIVE
HBV CORE AB SERPL QL IA: NEGATIVE
HBV CORE IGM SERPL QL IA: NEGATIVE
HBV SURFACE AG SERPL QL IA: NEGATIVE
HCV AB S/CO SERPL IA: <0.1 S/CO RATIO (ref 0–0.9)
IRON SATN MFR SERPL: 38 % (ref 15–55)
IRON SERPL-MCNC: 122 UG/DL (ref 38–169)
TIBC SERPL-MCNC: 323 UG/DL (ref 250–450)
UIBC SERPL-MCNC: 201 UG/DL (ref 111–343)

## 2020-09-09 NOTE — TELEPHONE ENCOUNTER
Please let pt know I reviewed BG log    -BGs look much better, very mild elevation in morning number    Increase lantus to 35 units     Continue same doses of ozempic and glimepiride

## 2020-09-11 ENCOUNTER — HOSPITAL ENCOUNTER (OUTPATIENT)
Dept: RADIOLOGY | Facility: HOSPITAL | Age: 55
Discharge: HOME | End: 2020-09-11
Attending: INTERNAL MEDICINE
Payer: COMMERCIAL

## 2020-09-11 DIAGNOSIS — R79.89 ELEVATED LFTS: ICD-10-CM

## 2020-09-11 PROCEDURE — 76705 ECHO EXAM OF ABDOMEN: CPT

## 2020-09-14 DIAGNOSIS — R74.8 ELEVATED LIVER ENZYMES: Primary | ICD-10-CM

## 2020-09-17 ENCOUNTER — TELEPHONE (OUTPATIENT)
Dept: GASTROENTEROLOGY | Facility: AMBULARY SURGERY CENTER | Age: 55
End: 2020-09-17

## 2020-09-24 ENCOUNTER — TELEPHONE (OUTPATIENT)
Dept: ENDOCRINOLOGY | Facility: CLINIC | Age: 55
End: 2020-09-24

## 2020-09-24 NOTE — TELEPHONE ENCOUNTER
Please let pt know I reviewed BG log     -BGs look much better, very mild elevation in morning number but I want to see what his A1C comes back at in October before making a change     Continue same regimen for now

## 2020-10-01 ENCOUNTER — APPOINTMENT (OUTPATIENT)
Dept: LAB | Facility: CLINIC | Age: 55
End: 2020-10-01
Payer: COMMERCIAL

## 2020-10-01 DIAGNOSIS — R74.8 ELEVATED LIVER ENZYMES: ICD-10-CM

## 2020-10-01 PROCEDURE — 36415 COLL VENOUS BLD VENIPUNCTURE: CPT

## 2020-10-01 PROCEDURE — 81256 HFE GENE: CPT

## 2020-10-06 ENCOUNTER — TELEPHONE (OUTPATIENT)
Dept: ENDOCRINOLOGY | Facility: CLINIC | Age: 55
End: 2020-10-06

## 2020-10-06 DIAGNOSIS — E11.65 TYPE 2 DIABETES MELLITUS WITH HYPERGLYCEMIA, WITH LONG-TERM CURRENT USE OF INSULIN (HCC): Primary | ICD-10-CM

## 2020-10-06 DIAGNOSIS — Z79.4 TYPE 2 DIABETES MELLITUS WITH HYPERGLYCEMIA, WITH LONG-TERM CURRENT USE OF INSULIN (HCC): Primary | ICD-10-CM

## 2020-10-08 LAB — HFE GENE MUT ANL BLD/T: NORMAL

## 2020-10-09 ENCOUNTER — APPOINTMENT (OUTPATIENT)
Dept: LAB | Facility: CLINIC | Age: 55
End: 2020-10-09
Payer: COMMERCIAL

## 2020-10-09 DIAGNOSIS — E78.2 MIXED HYPERLIPIDEMIA: ICD-10-CM

## 2020-10-09 DIAGNOSIS — Z79.4 TYPE 2 DIABETES MELLITUS WITH HYPERGLYCEMIA, WITH LONG-TERM CURRENT USE OF INSULIN (HCC): ICD-10-CM

## 2020-10-09 DIAGNOSIS — I10 HYPERTENSION GOAL BP (BLOOD PRESSURE) < 140/90: ICD-10-CM

## 2020-10-09 DIAGNOSIS — E11.65 TYPE 2 DIABETES MELLITUS WITH HYPERGLYCEMIA, WITH LONG-TERM CURRENT USE OF INSULIN (HCC): ICD-10-CM

## 2020-10-09 DIAGNOSIS — R79.89 ELEVATED LFTS: ICD-10-CM

## 2020-10-09 LAB
ALBUMIN SERPL BCP-MCNC: 4.3 G/DL (ref 3.5–5)
ALP SERPL-CCNC: 77 U/L (ref 46–116)
ALT SERPL W P-5'-P-CCNC: 93 U/L (ref 12–78)
ANION GAP SERPL CALCULATED.3IONS-SCNC: 4 MMOL/L (ref 4–13)
AST SERPL W P-5'-P-CCNC: 53 U/L (ref 5–45)
BILIRUB SERPL-MCNC: 1.35 MG/DL (ref 0.2–1)
BUN SERPL-MCNC: 11 MG/DL (ref 5–25)
CALCIUM SERPL-MCNC: 9.5 MG/DL (ref 8.3–10.1)
CHLORIDE SERPL-SCNC: 105 MMOL/L (ref 100–108)
CHOLEST SERPL-MCNC: 139 MG/DL (ref 50–200)
CO2 SERPL-SCNC: 29 MMOL/L (ref 21–32)
CREAT SERPL-MCNC: 0.96 MG/DL (ref 0.6–1.3)
EST. AVERAGE GLUCOSE BLD GHB EST-MCNC: 203 MG/DL
GFR SERPL CREATININE-BSD FRML MDRD: 90 ML/MIN/1.73SQ M
GLUCOSE P FAST SERPL-MCNC: 157 MG/DL (ref 65–99)
HBA1C MFR BLD: 8.7 %
HDLC SERPL-MCNC: 46 MG/DL
LDLC SERPL CALC-MCNC: 73 MG/DL (ref 0–100)
NONHDLC SERPL-MCNC: 93 MG/DL
POTASSIUM SERPL-SCNC: 4.3 MMOL/L (ref 3.5–5.3)
PROT SERPL-MCNC: 7.7 G/DL (ref 6.4–8.2)
SODIUM SERPL-SCNC: 138 MMOL/L (ref 136–145)
TRIGL SERPL-MCNC: 98 MG/DL

## 2020-10-09 PROCEDURE — 80061 LIPID PANEL: CPT

## 2020-10-09 PROCEDURE — 83036 HEMOGLOBIN GLYCOSYLATED A1C: CPT

## 2020-10-09 PROCEDURE — 36415 COLL VENOUS BLD VENIPUNCTURE: CPT

## 2020-10-09 PROCEDURE — 80053 COMPREHEN METABOLIC PANEL: CPT

## 2020-10-13 ENCOUNTER — TELEPHONE (OUTPATIENT)
Dept: ENDOCRINOLOGY | Facility: CLINIC | Age: 55
End: 2020-10-13

## 2020-10-27 DIAGNOSIS — E11.65 TYPE 2 DIABETES MELLITUS WITH HYPERGLYCEMIA, WITH LONG-TERM CURRENT USE OF INSULIN (HCC): ICD-10-CM

## 2020-10-27 DIAGNOSIS — Z79.4 TYPE 2 DIABETES MELLITUS WITH HYPERGLYCEMIA, WITH LONG-TERM CURRENT USE OF INSULIN (HCC): ICD-10-CM

## 2020-10-27 RX ORDER — INSULIN GLARGINE 100 [IU]/ML
38 INJECTION, SOLUTION SUBCUTANEOUS
Qty: 5 PEN | Refills: 3
Start: 2020-10-27 | End: 2020-11-05 | Stop reason: SDUPTHER

## 2020-11-05 DIAGNOSIS — Z79.4 TYPE 2 DIABETES MELLITUS WITH HYPERGLYCEMIA, WITH LONG-TERM CURRENT USE OF INSULIN (HCC): ICD-10-CM

## 2020-11-05 DIAGNOSIS — E11.65 TYPE 2 DIABETES MELLITUS WITH HYPERGLYCEMIA, WITH LONG-TERM CURRENT USE OF INSULIN (HCC): ICD-10-CM

## 2020-11-05 RX ORDER — INSULIN GLARGINE 100 [IU]/ML
38 INJECTION, SOLUTION SUBCUTANEOUS
Qty: 45 PEN | Refills: 1 | Status: SHIPPED | OUTPATIENT
Start: 2020-11-05 | End: 2021-03-17

## 2020-11-06 ENCOUNTER — TELEPHONE (OUTPATIENT)
Dept: ENDOCRINOLOGY | Facility: CLINIC | Age: 55
End: 2020-11-06

## 2020-11-27 ENCOUNTER — TELEPHONE (OUTPATIENT)
Dept: ENDOCRINOLOGY | Facility: CLINIC | Age: 55
End: 2020-11-27

## 2020-12-04 ENCOUNTER — OFFICE VISIT (OUTPATIENT)
Dept: GASTROENTEROLOGY | Facility: CLINIC | Age: 55
End: 2020-12-04
Payer: COMMERCIAL

## 2020-12-04 VITALS — TEMPERATURE: 96.8 F | HEIGHT: 71 IN | BODY MASS INDEX: 29.65 KG/M2 | WEIGHT: 211.8 LBS

## 2020-12-04 DIAGNOSIS — K76.0 NAFLD (NONALCOHOLIC FATTY LIVER DISEASE): Primary | ICD-10-CM

## 2020-12-04 PROCEDURE — 99213 OFFICE O/P EST LOW 20 MIN: CPT | Performed by: INTERNAL MEDICINE

## 2020-12-08 ENCOUNTER — TELEPHONE (OUTPATIENT)
Dept: ENDOCRINOLOGY | Facility: CLINIC | Age: 55
End: 2020-12-08

## 2020-12-30 ENCOUNTER — OFFICE VISIT (OUTPATIENT)
Dept: ENDOCRINOLOGY | Facility: CLINIC | Age: 55
End: 2020-12-30
Payer: COMMERCIAL

## 2020-12-30 VITALS
SYSTOLIC BLOOD PRESSURE: 136 MMHG | HEART RATE: 90 BPM | BODY MASS INDEX: 29.97 KG/M2 | WEIGHT: 214.1 LBS | HEIGHT: 71 IN | DIASTOLIC BLOOD PRESSURE: 72 MMHG | TEMPERATURE: 96.7 F

## 2020-12-30 DIAGNOSIS — E11.65 TYPE 2 DIABETES MELLITUS WITH HYPERGLYCEMIA, WITH LONG-TERM CURRENT USE OF INSULIN (HCC): Primary | ICD-10-CM

## 2020-12-30 DIAGNOSIS — E78.2 MIXED HYPERLIPIDEMIA: ICD-10-CM

## 2020-12-30 DIAGNOSIS — Z79.4 TYPE 2 DIABETES MELLITUS WITH HYPERGLYCEMIA, WITH LONG-TERM CURRENT USE OF INSULIN (HCC): Primary | ICD-10-CM

## 2020-12-30 DIAGNOSIS — I10 HYPERTENSION GOAL BP (BLOOD PRESSURE) < 140/90: ICD-10-CM

## 2020-12-30 PROCEDURE — 99214 OFFICE O/P EST MOD 30 MIN: CPT | Performed by: INTERNAL MEDICINE

## 2020-12-30 RX ORDER — ALPRAZOLAM 0.5 MG/1
TABLET ORAL AS NEEDED
COMMUNITY

## 2021-01-12 ENCOUNTER — TELEPHONE (OUTPATIENT)
Dept: ENDOCRINOLOGY | Facility: CLINIC | Age: 56
End: 2021-01-12

## 2021-01-19 DIAGNOSIS — E11.65 TYPE 2 DIABETES MELLITUS WITH HYPERGLYCEMIA, WITH LONG-TERM CURRENT USE OF INSULIN (HCC): ICD-10-CM

## 2021-01-19 DIAGNOSIS — Z79.4 TYPE 2 DIABETES MELLITUS WITH HYPERGLYCEMIA, WITH LONG-TERM CURRENT USE OF INSULIN (HCC): ICD-10-CM

## 2021-01-19 RX ORDER — BLOOD SUGAR DIAGNOSTIC
STRIP MISCELLANEOUS
Qty: 200 EACH | Refills: 2 | Status: SHIPPED | OUTPATIENT
Start: 2021-01-19 | End: 2021-02-08 | Stop reason: SDUPTHER

## 2021-01-25 ENCOUNTER — LAB (OUTPATIENT)
Dept: LAB | Facility: CLINIC | Age: 56
End: 2021-01-25
Payer: COMMERCIAL

## 2021-01-25 DIAGNOSIS — Z79.4 TYPE 2 DIABETES MELLITUS WITH HYPERGLYCEMIA, WITH LONG-TERM CURRENT USE OF INSULIN (HCC): ICD-10-CM

## 2021-01-25 DIAGNOSIS — E78.2 MIXED HYPERLIPIDEMIA: ICD-10-CM

## 2021-01-25 DIAGNOSIS — E11.65 TYPE 2 DIABETES MELLITUS WITH HYPERGLYCEMIA, WITH LONG-TERM CURRENT USE OF INSULIN (HCC): ICD-10-CM

## 2021-01-25 DIAGNOSIS — I10 HYPERTENSION GOAL BP (BLOOD PRESSURE) < 140/90: ICD-10-CM

## 2021-01-25 LAB
ALBUMIN SERPL BCP-MCNC: 4 G/DL (ref 3.5–5)
ALP SERPL-CCNC: 81 U/L (ref 46–116)
ALT SERPL W P-5'-P-CCNC: 64 U/L (ref 12–78)
ANION GAP SERPL CALCULATED.3IONS-SCNC: 4 MMOL/L (ref 4–13)
AST SERPL W P-5'-P-CCNC: 35 U/L (ref 5–45)
BILIRUB SERPL-MCNC: 1.46 MG/DL (ref 0.2–1)
BUN SERPL-MCNC: 14 MG/DL (ref 5–25)
CALCIUM SERPL-MCNC: 9.4 MG/DL (ref 8.3–10.1)
CHLORIDE SERPL-SCNC: 107 MMOL/L (ref 100–108)
CHOLEST SERPL-MCNC: 164 MG/DL (ref 50–200)
CO2 SERPL-SCNC: 28 MMOL/L (ref 21–32)
CREAT SERPL-MCNC: 0.98 MG/DL (ref 0.6–1.3)
EST. AVERAGE GLUCOSE BLD GHB EST-MCNC: 200 MG/DL
GFR SERPL CREATININE-BSD FRML MDRD: 87 ML/MIN/1.73SQ M
GLUCOSE P FAST SERPL-MCNC: 164 MG/DL (ref 65–99)
HBA1C MFR BLD: 8.6 %
HDLC SERPL-MCNC: 48 MG/DL
LDLC SERPL CALC-MCNC: 94 MG/DL (ref 0–100)
NONHDLC SERPL-MCNC: 116 MG/DL
POTASSIUM SERPL-SCNC: 4.5 MMOL/L (ref 3.5–5.3)
PROT SERPL-MCNC: 7.3 G/DL (ref 6.4–8.2)
SODIUM SERPL-SCNC: 139 MMOL/L (ref 136–145)
TRIGL SERPL-MCNC: 108 MG/DL

## 2021-01-25 PROCEDURE — 83036 HEMOGLOBIN GLYCOSYLATED A1C: CPT

## 2021-01-25 PROCEDURE — 80053 COMPREHEN METABOLIC PANEL: CPT

## 2021-01-25 PROCEDURE — 80061 LIPID PANEL: CPT

## 2021-01-25 PROCEDURE — 36415 COLL VENOUS BLD VENIPUNCTURE: CPT

## 2021-01-26 ENCOUNTER — TELEPHONE (OUTPATIENT)
Dept: ENDOCRINOLOGY | Facility: CLINIC | Age: 56
End: 2021-01-26

## 2021-01-26 NOTE — TELEPHONE ENCOUNTER
Spoke to patient, provided A1C results  He wanted ask if he can start on Metformin  His mother takes the metformin 2000 mg and is on no insulin, so wanted to know if that would be an option for him to help control his BG and bring the A1C down?

## 2021-01-27 DIAGNOSIS — E11.65 TYPE 2 DIABETES MELLITUS WITH HYPERGLYCEMIA, WITH LONG-TERM CURRENT USE OF INSULIN (HCC): Primary | ICD-10-CM

## 2021-01-27 DIAGNOSIS — Z79.4 TYPE 2 DIABETES MELLITUS WITH HYPERGLYCEMIA, WITH LONG-TERM CURRENT USE OF INSULIN (HCC): Primary | ICD-10-CM

## 2021-01-27 NOTE — TELEPHONE ENCOUNTER
patient wanted to know if he needs to stop any of the other diabetes medications since he will be starting metformin

## 2021-01-27 NOTE — TELEPHONE ENCOUNTER
Can start metformin 500 mg taken twice a day before meals    Metformin can sometimes cause nausea and loose bowel movements, please let me know if you have any of the side effects

## 2021-02-08 DIAGNOSIS — Z79.4 TYPE 2 DIABETES MELLITUS WITH HYPERGLYCEMIA, WITH LONG-TERM CURRENT USE OF INSULIN (HCC): ICD-10-CM

## 2021-02-08 DIAGNOSIS — E11.65 TYPE 2 DIABETES MELLITUS WITH HYPERGLYCEMIA, WITH LONG-TERM CURRENT USE OF INSULIN (HCC): ICD-10-CM

## 2021-02-08 RX ORDER — BLOOD SUGAR DIAGNOSTIC
STRIP MISCELLANEOUS
Qty: 200 EACH | Refills: 2 | Status: SHIPPED | OUTPATIENT
Start: 2021-02-08

## 2021-02-08 RX ORDER — LANCETS
EACH MISCELLANEOUS
Qty: 204 EACH | Refills: 3 | Status: SHIPPED | OUTPATIENT
Start: 2021-02-08

## 2021-02-08 NOTE — TELEPHONE ENCOUNTER
Please let patient know I reviewed blood sugar log     Blood sugars look stable continue same regimen for now    Check BG a few times at bedtime

## 2021-02-10 ENCOUNTER — TRANSCRIBE ORDERS (OUTPATIENT)
Dept: ADMINISTRATIVE | Facility: HOSPITAL | Age: 56
End: 2021-02-10

## 2021-02-10 DIAGNOSIS — R20.2 TINGLING: Primary | ICD-10-CM

## 2021-02-26 ENCOUNTER — HOSPITAL ENCOUNTER (OUTPATIENT)
Dept: RADIOLOGY | Facility: HOSPITAL | Age: 56
Discharge: HOME | End: 2021-02-26
Payer: COMMERCIAL

## 2021-02-26 DIAGNOSIS — R20.2 TINGLING IN EXTREMITIES: ICD-10-CM

## 2021-02-26 DIAGNOSIS — M79.605 PAIN IN BOTH LOWER EXTREMITIES: ICD-10-CM

## 2021-02-26 DIAGNOSIS — M79.604 PAIN IN BOTH LOWER EXTREMITIES: ICD-10-CM

## 2021-02-26 PROCEDURE — 93923 UPR/LXTR ART STDY 3+ LVLS: CPT

## 2021-02-26 PROCEDURE — 93925 LOWER EXTREMITY STUDY: CPT

## 2021-02-26 PROCEDURE — 93970 EXTREMITY STUDY: CPT

## 2021-02-26 PROCEDURE — 93970 EXTREMITY STUDY: CPT | Performed by: SURGERY

## 2021-02-27 DIAGNOSIS — E11.65 TYPE 2 DIABETES MELLITUS WITH HYPERGLYCEMIA, WITH LONG-TERM CURRENT USE OF INSULIN (HCC): ICD-10-CM

## 2021-02-27 DIAGNOSIS — Z79.4 TYPE 2 DIABETES MELLITUS WITH HYPERGLYCEMIA, WITH LONG-TERM CURRENT USE OF INSULIN (HCC): ICD-10-CM

## 2021-02-27 PROCEDURE — 93925 LOWER EXTREMITY STUDY: CPT | Performed by: SURGERY

## 2021-02-27 PROCEDURE — 93922 UPR/L XTREMITY ART 2 LEVELS: CPT | Performed by: SURGERY

## 2021-02-27 NOTE — TELEPHONE ENCOUNTER
Reviewed BG log, having some post-prandial hyperglycemia    Continue same regimen except increase metformin to 1000mg BID AC

## 2021-03-16 ENCOUNTER — OFFICE VISIT (OUTPATIENT)
Dept: GASTROENTEROLOGY | Facility: CLINIC | Age: 56
End: 2021-03-16
Payer: COMMERCIAL

## 2021-03-16 VITALS
DIASTOLIC BLOOD PRESSURE: 78 MMHG | TEMPERATURE: 97.8 F | SYSTOLIC BLOOD PRESSURE: 132 MMHG | HEART RATE: 90 BPM | WEIGHT: 205 LBS | BODY MASS INDEX: 28.7 KG/M2 | HEIGHT: 71 IN

## 2021-03-16 DIAGNOSIS — E11.65 TYPE 2 DIABETES MELLITUS WITH HYPERGLYCEMIA, WITH LONG-TERM CURRENT USE OF INSULIN (HCC): ICD-10-CM

## 2021-03-16 DIAGNOSIS — R74.8 ELEVATED LIVER ENZYMES: Primary | ICD-10-CM

## 2021-03-16 DIAGNOSIS — Z79.4 TYPE 2 DIABETES MELLITUS WITH HYPERGLYCEMIA, WITH LONG-TERM CURRENT USE OF INSULIN (HCC): ICD-10-CM

## 2021-03-16 PROCEDURE — 99213 OFFICE O/P EST LOW 20 MIN: CPT | Performed by: INTERNAL MEDICINE

## 2021-03-16 RX ORDER — CYCLOBENZAPRINE HCL 5 MG
TABLET ORAL
COMMUNITY
Start: 2021-02-10

## 2021-03-16 NOTE — PATIENT INSTRUCTIONS
- have blood drawn to check liver panel   - good job losing! Continue to work on weight loss  - continue lifestyle changes to reduce weight: decreasing carbohydrates/starchy foods, decreasing sugary drinks, limiting portion, and eating more fruits and veggies; encourage 30 minutes of exercise 5-6x per week     - if liver tests are stable/improved following up in 3 months in clinic or if increasing then liver biopsy    Follow up in 3-4 months with PA

## 2021-03-16 NOTE — PROGRESS NOTES
[facility] - Outpatient Follow up  Hernandez Jose 48 y o  male MRN: [id number]  Encounter: [id number]          ASSESSMENT AND PLAN:      1  Elevated liver number  - most likely due to fatty liver  - chronic liver disease labs negative  - transaminases mildly improved from before but ALT remain elevated in January 2021  - has lost about 10 lbs since last visit  - have blood drawn to check liver panel   - continue to avoid alcohol  - continue to work on weight loss  - if liver tests are stable/improved following up in 3 months in clinic or if increasing then liver biopsy    2  Elevated bilirubin  - 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    3  Co-morbidities: HTN, HL, DM    Follow up in 3 months with PA    ______________________________________________________________________    HPI:  52-year-old man who comes in to discuss elevated liver chemistries  Co-morbidities: HTN, HL, DM    Elevated liver chemistries  - has lost about 10 lbs over the last 3 months  - transaminases mildly improved from before but ALT remain elevated  - started on metformin in February 2021  - not drinking   - also takes some vitamins but stopped those shortly after clinic visit  - no personal or family history of liver disease  - no travel outside of [place], no blood transfusion, no IVDU  - +tattoos but all done in a tattoo studio  - was having 1-2 drinks prior to initial clinic visit    No jaundice, abd swelling, LE swelling, confusion, pruritus  Cologuard 2021 negative  No family history of colon cancer    Diet: cereal/toast/breakfast sandwich, fruit, sandwich, onion rings, pea      REVIEW OF SYSTEMS:    CONSTITUTIONAL: Denies any fever, chills, rigors, and weight loss  HEENT: No earache or tinnitus  Denies hearing loss or visual disturbances  CARDIOVASCULAR: No chest pain or palpitations  RESPIRATORY: Denies any cough, hemoptysis, shortness of breath or dyspnea on exertion    GASTROINTESTINAL: As noted in the History of Present Illness  GENITOURINARY: No problems with urination  Denies any hematuria or dysuria  NEUROLOGIC: No dizziness or vertigo, denies headaches  MUSCULOSKELETAL: Denies any muscle or joint pain  SKIN: Denies skin rashes or itching  ENDOCRINE: Denies excessive thirst  Denies intolerance to heat or cold  PSYCHOSOCIAL: Denies depression or anxiety  Denies any recent memory loss         Historical Information   Past Medical History:   Diagnosis Date    Annual physical exam 12/29/2020    Diabetes mellitus ([place] [address] )     Diabetes type 2, uncontrolled ([place] [address] )     Fatty liver     Hyperlipidemia     Hypertension      Past Surgical History:   Procedure Laterality Date    APPENDECTOMY       Social History   Social History     Substance and Sexual Activity   Alcohol Use Yes    Comment: socially     Social History     Substance and Sexual Activity   Drug Use Never     Social History     Tobacco Use   Smoking Status Former Smoker   Smokeless Tobacco Never Used     Family History   Problem Relation Age of Onset    Diabetes type II Mother     Hypertension Father     Skin cancer Father     Diabetes type II Sister        Meds/Allergies       Current Outpatient Medications:     Accu-Chek FastClix Lancets MISC    ALPRAZolam (XANAX) 0 5 mg tablet    famotidine (PEPCID) 20 mg tablet    glimepiride (AMARYL) 4 mg tablet    glucose blood (Accu-Chek Guide) test strip    insulin glargine (Lantus SoloStar) 100 units/mL injection pen    Insulin Pen Needle (BD Pen Needle Melany U/F) 32G X 4 MM MISC    lisinopril-hydrochlorothiazide (PRINZIDE,ZESTORETIC) 10-12 5 MG per tablet    loratadine (CLARITIN) 10 mg tablet    metFORMIN (GLUCOPHAGE) 500 mg tablet    rosuvastatin (CRESTOR) 10 MG tablet    Semaglutide,0 25 or 0 5MG/DOS, 2 MG/1 5ML SOPN    cyclobenzaprine (FLEXERIL) 5 mg tablet    Allergies   Allergen Reactions    Bydureon [Exenatide]            Objective     Blood pressure 132/78, pulse 90, temperature 97 8 °F (36 6 °C), height 5' 11" (1 803 m), weight 93 kg (205 lb)  Body mass index is 28 59 kg/m²  PHYSICAL EXAM:      General Appearance:   Alert, cooperative, no distress   HEENT:   Normocephalic, atraumatic, anicteric  Neck:  Supple, symmetrical, trachea midline   Lungs:   Clear to auscultation bilaterally; no rales, rhonchi or wheezing; respirations unlabored    Heart[de-identified]   Regular rate and rhythm; no murmur, rub, or gallop  Abdomen:   Soft, non-tender, non-distended; normal bowel sounds; no masses, no organomegaly    Rectal:   Deferred   Neuro:   Alert, oriented, no gross deficits, normal strength and tone   Extremities:  No cyanosis, clubbing or edema    Psych:  Normal mood and affect    Skin:  No jaundice, rashes, or lesions    Lymph nodes:  No palpable cervical lymphadenopathy        Lab Results:   No visits with results within 1 Day(s) from this visit  Latest known visit with results is:   Lab on 01/25/2021   Component Date Value    Sodium 01/25/2021 139     Potassium 01/25/2021 4 5     Chloride 01/25/2021 107     CO2 01/25/2021 28     ANION GAP 01/25/2021 4     BUN 01/25/2021 14     Creatinine 01/25/2021 0 98     Glucose, Fasting 01/25/2021 164*    Calcium 01/25/2021 9 4     AST 01/25/2021 35     ALT 01/25/2021 64     Alkaline Phosphatase 01/25/2021 81     Total Protein 01/25/2021 7 3     Albumin 01/25/2021 4 0     Total Bilirubin 01/25/2021 1 46*    eGFR 01/25/2021 87     Hemoglobin A1C 01/25/2021 8 6*    EAG 01/25/2021 200     Cholesterol 01/25/2021 164     Triglycerides 01/25/2021 108     HDL, Direct 01/25/2021 48     LDL Calculated 01/25/2021 94     Non-HDL-Chol (CHOL-HDL) 01/25/2021 116          Radiology Results:   No results found      Endoscopies:

## 2021-03-17 ENCOUNTER — APPOINTMENT (OUTPATIENT)
Dept: LAB | Facility: CLINIC | Age: 56
End: 2021-03-17
Payer: COMMERCIAL

## 2021-03-17 DIAGNOSIS — Z79.4 TYPE 2 DIABETES MELLITUS WITH HYPERGLYCEMIA, WITH LONG-TERM CURRENT USE OF INSULIN (HCC): ICD-10-CM

## 2021-03-17 DIAGNOSIS — E11.65 TYPE 2 DIABETES MELLITUS WITH HYPERGLYCEMIA, WITH LONG-TERM CURRENT USE OF INSULIN (HCC): ICD-10-CM

## 2021-03-17 DIAGNOSIS — R74.8 ELEVATED LIVER ENZYMES: ICD-10-CM

## 2021-03-17 LAB
ALBUMIN SERPL BCP-MCNC: 4.1 G/DL (ref 3.5–5)
ALP SERPL-CCNC: 75 U/L (ref 46–116)
ALT SERPL W P-5'-P-CCNC: 52 U/L (ref 12–78)
ANION GAP SERPL CALCULATED.3IONS-SCNC: 5 MMOL/L (ref 4–13)
AST SERPL W P-5'-P-CCNC: 30 U/L (ref 5–45)
BILIRUB DIRECT SERPL-MCNC: 0.3 MG/DL (ref 0–0.2)
BILIRUB SERPL-MCNC: 1.29 MG/DL (ref 0.2–1)
BUN SERPL-MCNC: 16 MG/DL (ref 5–25)
CALCIUM SERPL-MCNC: 9.6 MG/DL (ref 8.3–10.1)
CHLORIDE SERPL-SCNC: 105 MMOL/L (ref 100–108)
CHOLEST SERPL-MCNC: 137 MG/DL (ref 50–200)
CO2 SERPL-SCNC: 30 MMOL/L (ref 21–32)
CREAT SERPL-MCNC: 0.97 MG/DL (ref 0.6–1.3)
CREAT UR-MCNC: 106 MG/DL
GFR SERPL CREATININE-BSD FRML MDRD: 88 ML/MIN/1.73SQ M
GLUCOSE P FAST SERPL-MCNC: 130 MG/DL (ref 65–99)
HDLC SERPL-MCNC: 47 MG/DL
LDLC SERPL CALC-MCNC: 70 MG/DL (ref 0–100)
MICROALBUMIN UR-MCNC: 12.8 MG/L (ref 0–20)
MICROALBUMIN/CREAT 24H UR: 12 MG/G CREATININE (ref 0–30)
NONHDLC SERPL-MCNC: 90 MG/DL
POTASSIUM SERPL-SCNC: 4.2 MMOL/L (ref 3.5–5.3)
PROT SERPL-MCNC: 7.7 G/DL (ref 6.4–8.2)
SODIUM SERPL-SCNC: 140 MMOL/L (ref 136–145)
TRIGL SERPL-MCNC: 99 MG/DL

## 2021-03-17 PROCEDURE — 80061 LIPID PANEL: CPT

## 2021-03-17 PROCEDURE — 82570 ASSAY OF URINE CREATININE: CPT

## 2021-03-17 PROCEDURE — 36415 COLL VENOUS BLD VENIPUNCTURE: CPT

## 2021-03-17 PROCEDURE — 82248 BILIRUBIN DIRECT: CPT

## 2021-03-17 PROCEDURE — 82043 UR ALBUMIN QUANTITATIVE: CPT

## 2021-03-17 PROCEDURE — 80053 COMPREHEN METABOLIC PANEL: CPT

## 2021-03-17 RX ORDER — INSULIN GLARGINE 100 [IU]/ML
INJECTION, SOLUTION SUBCUTANEOUS
Qty: 45 ML | Refills: 1 | Status: SHIPPED | OUTPATIENT
Start: 2021-03-17 | End: 2021-04-21 | Stop reason: SDUPTHER

## 2021-03-18 ENCOUNTER — OFFICE VISIT (OUTPATIENT)
Dept: ENDOCRINOLOGY | Facility: CLINIC | Age: 56
End: 2021-03-18
Payer: COMMERCIAL

## 2021-03-18 VITALS
WEIGHT: 208 LBS | DIASTOLIC BLOOD PRESSURE: 84 MMHG | BODY MASS INDEX: 29.12 KG/M2 | TEMPERATURE: 97 F | HEART RATE: 81 BPM | SYSTOLIC BLOOD PRESSURE: 130 MMHG | HEIGHT: 71 IN

## 2021-03-18 DIAGNOSIS — Z79.4 TYPE 2 DIABETES MELLITUS WITH HYPERGLYCEMIA, WITH LONG-TERM CURRENT USE OF INSULIN (HCC): ICD-10-CM

## 2021-03-18 DIAGNOSIS — I10 HYPERTENSION GOAL BP (BLOOD PRESSURE) < 140/90: ICD-10-CM

## 2021-03-18 DIAGNOSIS — E11.65 TYPE 2 DIABETES MELLITUS WITH HYPERGLYCEMIA, WITH LONG-TERM CURRENT USE OF INSULIN (HCC): ICD-10-CM

## 2021-03-18 DIAGNOSIS — E78.2 MIXED HYPERLIPIDEMIA: Primary | ICD-10-CM

## 2021-03-18 PROCEDURE — 99214 OFFICE O/P EST MOD 30 MIN: CPT | Performed by: INTERNAL MEDICINE

## 2021-03-18 NOTE — PROGRESS NOTES
ENDOCRINOLOGY  FOLLOW UP VISIT      Reason for Endocrine Consult/Chief Complaint: DM management      ? Medical Decision Making:     Impression  1  Type 2 Diabetes uncontrolled  2  HTN  3  HLD mixed  4  Elevated LFTs - now resolved        Recommendations:  ?   BGs have improved significantly, continue lantus to 38 units qHS, continue glimepiride 4mg qday and Ozempic 0 5mg weekly and metformin 1000mg BID AC     HTN- controlled on ACEi-HCTZ continue     HLD- LDL 70, triglycerides 99 March 2021, continue crestor 10mg every other day        Veronica LIMA  Endocrinology           History of Present Illness:   Mr Jean Cole is a 49year old male who presents for diabetes evaluation-follow up      [facility] was managing diabetes before this         ?  PMH-DM2, HTN, HLD   PSH-appendectomy   FHx-mother with diabetes and MGM with diabetes  SHx-prior smoker, neg x 2, works [employer]  (3pm-11PM shift)     Type of DM: 2  Age of onset: 5 years ago   Microvascular complications: none known   Macrovascular complications:none known         Events since last visit:   on lantus 38 units qHS, glimepiride 4mg qday and Ozempic 0 5mg weekly on fridays and metformin 1000mg BID AC     FBG-low to mid 100s  Premeal/qHS BG-mid 100s  Hypoglycemia- none      ? Review of Systems:     Review of Systems   Constitutional: Negative for appetite change, chills, diaphoresis, fatigue, fever and unexpected weight change  HENT: Negative for congestion, ear pain, hearing loss, rhinorrhea, sinus pressure, sinus pain, sore throat, trouble swallowing and voice change  Eyes: Negative for photophobia, redness and visual disturbance  Respiratory: Negative for apnea, cough, chest tightness, shortness of breath, wheezing and stridor  Cardiovascular: Negative for chest pain, palpitations and leg swelling     Gastrointestinal: Negative for abdominal distention, abdominal pain, constipation, diarrhea, nausea and vomiting  Endocrine: Negative for cold intolerance, heat intolerance, polydipsia, polyphagia and polyuria  Genitourinary: Negative for difficulty urinating, dysuria, flank pain, frequency, hematuria and urgency  Musculoskeletal: Negative for arthralgias, back pain, gait problem, joint swelling and myalgias  Skin: Negative for color change, pallor, rash and wound  Allergic/Immunologic: Negative for immunocompromised state  Neurological: Negative for dizziness, tremors, syncope, weakness, light-headedness and headaches  Hematological: Negative for adenopathy  Does not bruise/bleed easily  Psychiatric/Behavioral: Negative for confusion and sleep disturbance  The patient is not nervous/anxious  ?   Patient History:     Past Medical History:   Diagnosis Date    Annual physical exam 12/29/2020    Diabetes mellitus ([place] [facility] )     Diabetes type 2, uncontrolled ([place] [address] )     Fatty liver     Hyperlipidemia     Hypertension      Past Surgical History:   Procedure Laterality Date    APPENDECTOMY       Social History     Socioeconomic History    Marital status:      Spouse name: Not on file    Number of children: Not on file    Years of education: Not on file    Highest education level: Not on file   Occupational History    Not on file   Social Needs    Financial resource strain: Not on file    Food insecurity     Worry: Not on file     Inability: Not on file   Embibe needs     Medical: Not on file     Non-medical: Not on file   Tobacco Use    Smoking status: Former Smoker    Smokeless tobacco: Never Used   Substance and Sexual Activity    Alcohol use: Yes     Comment: socially    Drug use: Never    Sexual activity: Not on file   Lifestyle    Physical activity     Days per week: Not on file     Minutes per session: Not on file    Stress: Not on file   Relationships    Social connections     Talks on phone: Not on file     Gets together: Not on file     Attends [employer] service: Not on file     Active member of club or organization: Not on file     Attends meetings of clubs or organizations: Not on file     Relationship status: Not on file    Intimate partner violence     Fear of current or ex partner: Not on file     Emotionally abused: Not on file     Physically abused: Not on file     Forced sexual activity: Not on file   Other Topics Concern    Not on file   Social History Narrative    Not on file     Family History   Problem Relation Age of Onset    Diabetes type II Mother     Hypertension Father     Skin cancer Father     Diabetes type II Sister        Current Medications: At the time this note was written these were the medications the patient was on    Current Outpatient Medications   Medication Sig Dispense Refill    Accu-Chek FastClix Lancets MISC Use 2 lancets a day to check blood sugar 204 each 3    ALPRAZolam (XANAX) 0 5 mg tablet Take by mouth as needed for anxiety      famotidine (PEPCID) 20 mg tablet Take 20 mg by mouth as needed       glimepiride (AMARYL) 4 mg tablet Take 1 tablet (4 mg total) by mouth daily with breakfast 90 tablet 3    glucose blood (Accu-Chek Guide) test strip Use to test blood sugar twice a day 200 each 2    Insulin Pen Needle (BD Pen Needle Melany U/F) 32G X 4 MM MISC Use 1 pen needle a day to administer insulin under the skin 100 each 3    Lantus SoloStar 100 units/mL injection pen INJECT SUBCUTANEOUSLY 38  UNITS DAILY AT BEDTIME 45 mL 1    lisinopril-hydrochlorothiazide (PRINZIDE,ZESTORETIC) 10-12 5 MG per tablet Take 1 tablet by mouth daily      loratadine (CLARITIN) 10 mg tablet Take 10 mg by mouth daily      metFORMIN (GLUCOPHAGE) 500 mg tablet Take 2 tablets twice a day before meals (Patient taking differently: 1,000 mg Take 2 tablets twice a day before meals) 340 tablet 3    rosuvastatin (CRESTOR) 10 MG tablet Take 10 mg by mouth daily      Semaglutide,0 25 or 0 5MG/DOS, 2 MG/1 5ML SOPN Inject 0 5 mg under the skin once a week 3 pen 3    cyclobenzaprine (FLEXERIL) 5 mg tablet        No current facility-administered medications for this visit  Allergies: Bydureon [exenatide]    Physical Exam:   Vital Signs:   /84   Pulse 81   Temp (!) 97 °F (36 1 °C)   Ht 5' 11" (1 803 m)   Wt 94 3 kg (208 lb)   BMI 29 01 kg/m²     Physical Exam  Vitals signs reviewed  Constitutional:       General: He is not in acute distress  Appearance: Normal appearance  He is not ill-appearing, toxic-appearing or diaphoretic  HENT:      Head: Normocephalic and atraumatic  Right Ear: External ear normal       Left Ear: External ear normal       Nose: Nose normal    Eyes:      General: No scleral icterus  Extraocular Movements: Extraocular movements intact  Conjunctiva/sclera: Conjunctivae normal    Neck:      Musculoskeletal: Normal range of motion and neck supple  No muscular tenderness  Comments: No thyromegaly or nodules  Cardiovascular:      Rate and Rhythm: Normal rate and regular rhythm  Heart sounds: Normal heart sounds  No murmur  No friction rub  No gallop  Pulmonary:      Effort: Pulmonary effort is normal  No respiratory distress  Breath sounds: Normal breath sounds  No stridor  No wheezing, rhonchi or rales  Abdominal:      General: Bowel sounds are normal  There is no distension  Palpations: Abdomen is soft  There is no mass  Tenderness: There is no abdominal tenderness  There is no guarding or rebound  Hernia: No hernia is present  Musculoskeletal: Normal range of motion  General: No swelling  Lymphadenopathy:      Cervical: No cervical adenopathy  Skin:     General: Skin is warm and dry  Coloration: Skin is not pale  Findings: No erythema or rash  Neurological:      General: No focal deficit present  Mental Status: He is alert and oriented to person, place, and time     Psychiatric:         Mood and Affect: Mood normal          Behavior: Behavior normal          Thought Content:  Thought content normal          Judgment: Judgment normal             Labs and Imaging:      Component      Latest Ref Rng & Units 3/17/2021   Sodium      136 - 145 mmol/L 140   Potassium      3 5 - 5 3 mmol/L 4 2   Chloride      100 - 108 mmol/L 105   CO2      21 - 32 mmol/L 30   Anion Gap      4 - 13 mmol/L 5   BUN      5 - 25 mg/dL 16   Creatinine      0 60 - 1 30 mg/dL 0 97   GLUCOSE FASTING      65 - 99 mg/dL 130 (H)   Calcium      8 3 - 10 1 mg/dL 9 6   AST      5 - 45 U/L 30   ALT      12 - 78 U/L 52   Alkaline Phosphatase      46 - 116 U/L 75   Total Protein      6 4 - 8 2 g/dL 7 7   Albumin      3 5 - 5 0 g/dL 4 1   TOTAL BILIRUBIN      0 20 - 1 00 mg/dL 1 29 (H)   eGFR      ml/min/1 73sq m 88   Cholesterol      50 - 200 mg/dL 137   Triglycerides      <=150 mg/dL 99   HDL      >=40 mg/dL 47   LDL Calculated      0 - 100 mg/dL 70   Non-HDL Cholesterol      mg/dl 90   EXT Creatinine Urine      mg/dL 106 0   MICROALBUM ,U,RANDOM      0 0 - 20 0 mg/L 12 8   MICROALBUMIN/CREATININE RATIO      0 - 30 mg/g creatinine 12   BILIRUBIN DIRECT      0 00 - 0 20 mg/dL 0 30 (H)

## 2021-03-22 DIAGNOSIS — E11.65 TYPE 2 DIABETES MELLITUS WITH HYPERGLYCEMIA, WITH LONG-TERM CURRENT USE OF INSULIN (HCC): ICD-10-CM

## 2021-03-22 DIAGNOSIS — E78.2 MIXED HYPERLIPIDEMIA: ICD-10-CM

## 2021-03-22 DIAGNOSIS — Z79.4 TYPE 2 DIABETES MELLITUS WITH HYPERGLYCEMIA, WITH LONG-TERM CURRENT USE OF INSULIN (HCC): ICD-10-CM

## 2021-03-22 DIAGNOSIS — I10 HYPERTENSION GOAL BP (BLOOD PRESSURE) < 140/90: ICD-10-CM

## 2021-03-22 NOTE — TELEPHONE ENCOUNTER
Dr Lukasz Way  Increase his  Medication of metformin he just want to check the status  Did it go over  phamacy

## 2021-03-30 DIAGNOSIS — I10 HYPERTENSION GOAL BP (BLOOD PRESSURE) < 140/90: ICD-10-CM

## 2021-03-30 DIAGNOSIS — E78.2 MIXED HYPERLIPIDEMIA: ICD-10-CM

## 2021-03-30 DIAGNOSIS — E11.65 TYPE 2 DIABETES MELLITUS WITH HYPERGLYCEMIA, WITHOUT LONG-TERM CURRENT USE OF INSULIN (HCC): ICD-10-CM

## 2021-03-31 RX ORDER — GLIMEPIRIDE 4 MG/1
TABLET ORAL
Qty: 90 TABLET | Refills: 0 | Status: SHIPPED | OUTPATIENT
Start: 2021-03-31 | End: 2021-06-21

## 2021-04-20 DIAGNOSIS — Z79.4 TYPE 2 DIABETES MELLITUS WITH HYPERGLYCEMIA, WITH LONG-TERM CURRENT USE OF INSULIN (HCC): ICD-10-CM

## 2021-04-20 DIAGNOSIS — E11.65 TYPE 2 DIABETES MELLITUS WITH HYPERGLYCEMIA, WITH LONG-TERM CURRENT USE OF INSULIN (HCC): ICD-10-CM

## 2021-04-20 NOTE — TELEPHONE ENCOUNTER
Reviewed BG log    Had one or two low readings in the morning and at bedtime  Reduce lantus to 36 units    Continue same dose on glimepiride and metformin and ozempic for now if you have more low BGs let me know and I can reduce the lantus or glimepiride dose sooner     Prescribe glucose tablets for treatment of low BGs

## 2021-04-21 ENCOUNTER — TELEPHONE (OUTPATIENT)
Dept: ENDOCRINOLOGY | Facility: CLINIC | Age: 56
End: 2021-04-21

## 2021-04-21 DIAGNOSIS — E11.65 TYPE 2 DIABETES MELLITUS WITH HYPERGLYCEMIA, WITH LONG-TERM CURRENT USE OF INSULIN (HCC): Primary | ICD-10-CM

## 2021-04-21 DIAGNOSIS — Z79.4 TYPE 2 DIABETES MELLITUS WITH HYPERGLYCEMIA, WITH LONG-TERM CURRENT USE OF INSULIN (HCC): Primary | ICD-10-CM

## 2021-04-21 RX ORDER — INSULIN GLARGINE 100 [IU]/ML
INJECTION, SOLUTION SUBCUTANEOUS
Qty: 45 ML | Refills: 1
Start: 2021-04-21 | End: 2021-10-06 | Stop reason: SDUPTHER

## 2021-05-17 ENCOUNTER — TELEPHONE (OUTPATIENT)
Dept: ENDOCRINOLOGY | Facility: CLINIC | Age: 56
End: 2021-05-17

## 2021-05-17 DIAGNOSIS — I10 HYPERTENSION GOAL BP (BLOOD PRESSURE) < 140/90: ICD-10-CM

## 2021-05-17 DIAGNOSIS — E11.65 TYPE 2 DIABETES MELLITUS WITH HYPERGLYCEMIA, WITHOUT LONG-TERM CURRENT USE OF INSULIN (HCC): ICD-10-CM

## 2021-05-17 DIAGNOSIS — E78.2 MIXED HYPERLIPIDEMIA: ICD-10-CM

## 2021-05-17 RX ORDER — PEN NEEDLE, DIABETIC 32GX 5/32"
NEEDLE, DISPOSABLE MISCELLANEOUS
Qty: 90 EACH | Refills: 1 | Status: SHIPPED | OUTPATIENT
Start: 2021-05-17 | End: 2021-11-01

## 2021-05-17 NOTE — TELEPHONE ENCOUNTER
Called and spoke to patient, he stated that he has been trying to get glucose tablets from [facility]  He stated he will purchase them over the counter  No need to call [facility] to get the refill for him

## 2021-05-26 DIAGNOSIS — E11.65 TYPE 2 DIABETES MELLITUS WITH HYPERGLYCEMIA, WITHOUT LONG-TERM CURRENT USE OF INSULIN (HCC): Primary | ICD-10-CM

## 2021-05-26 RX ORDER — BLOOD SUGAR DIAGNOSTIC
STRIP MISCELLANEOUS
Qty: 200 EACH | Refills: 3 | Status: SHIPPED | OUTPATIENT
Start: 2021-05-26

## 2021-06-01 ENCOUNTER — TELEPHONE (OUTPATIENT)
Dept: ENDOCRINOLOGY | Facility: CLINIC | Age: 56
End: 2021-06-01

## 2021-06-23 DIAGNOSIS — E11.65 TYPE 2 DIABETES MELLITUS WITH HYPERGLYCEMIA, WITHOUT LONG-TERM CURRENT USE OF INSULIN (HCC): ICD-10-CM

## 2021-06-23 DIAGNOSIS — E78.2 MIXED HYPERLIPIDEMIA: ICD-10-CM

## 2021-06-23 DIAGNOSIS — I10 HYPERTENSION GOAL BP (BLOOD PRESSURE) < 140/90: ICD-10-CM

## 2021-06-24 RX ORDER — GLIMEPIRIDE 2 MG/1
TABLET ORAL
Qty: 90 TABLET | Refills: 1 | Status: SHIPPED | OUTPATIENT
Start: 2021-06-24 | End: 2021-11-24

## 2021-06-24 NOTE — TELEPHONE ENCOUNTER
Covering for Dr Silvia Nieves  Noted to have some tight / low blood sugars - 64, 73, 72, 78 mg/dL   Please asked patient if he can explain why these lows occurred - did eat less/ was he more active?    If not, would recommend decreasing glimepiride to 2 mg orally daily

## 2021-06-24 NOTE — TELEPHONE ENCOUNTER
Spoke with patient and reviewed bg log  He claims he really did nothing different  He will decrease the Glimipiride to 2 mg daily

## 2021-06-29 ENCOUNTER — TELEPHONE (OUTPATIENT)
Dept: ENDOCRINOLOGY | Facility: CLINIC | Age: 56
End: 2021-06-29

## 2021-06-29 NOTE — TELEPHONE ENCOUNTER
Spoke with patient and reviewed bg log  He understood  He will let us know if having consistent high or low sugars

## 2021-06-29 NOTE — TELEPHONE ENCOUNTER
Covering for Dr Rianna Ellis  Reviewed  Patient decrease dose of glimepiride on 06/27, there is no blood sugar data since after   Should continue current regimen, follow-up as scheduled with Dr Rianna Ellis    Send log for review earlier if patient notices persistent high or low blood sugars

## 2021-07-12 ENCOUNTER — TELEPHONE (OUTPATIENT)
Dept: ENDOCRINOLOGY | Facility: CLINIC | Age: 56
End: 2021-07-12

## 2021-07-19 DIAGNOSIS — E11.65 TYPE 2 DIABETES MELLITUS WITH HYPERGLYCEMIA, WITH LONG-TERM CURRENT USE OF INSULIN (HCC): ICD-10-CM

## 2021-07-19 DIAGNOSIS — I10 HYPERTENSION GOAL BP (BLOOD PRESSURE) < 140/90: ICD-10-CM

## 2021-07-19 DIAGNOSIS — Z79.4 TYPE 2 DIABETES MELLITUS WITH HYPERGLYCEMIA, WITH LONG-TERM CURRENT USE OF INSULIN (HCC): ICD-10-CM

## 2021-07-19 DIAGNOSIS — E78.2 MIXED HYPERLIPIDEMIA: ICD-10-CM

## 2021-07-20 ENCOUNTER — OFFICE VISIT (OUTPATIENT)
Dept: ENDOCRINOLOGY | Facility: CLINIC | Age: 56
End: 2021-07-20
Payer: COMMERCIAL

## 2021-07-20 VITALS
WEIGHT: 198 LBS | BODY MASS INDEX: 27.72 KG/M2 | DIASTOLIC BLOOD PRESSURE: 80 MMHG | SYSTOLIC BLOOD PRESSURE: 130 MMHG | HEART RATE: 95 BPM | HEIGHT: 71 IN | TEMPERATURE: 97 F

## 2021-07-20 DIAGNOSIS — I10 HYPERTENSION GOAL BP (BLOOD PRESSURE) < 140/90: ICD-10-CM

## 2021-07-20 DIAGNOSIS — E78.2 MIXED HYPERLIPIDEMIA: ICD-10-CM

## 2021-07-20 DIAGNOSIS — E11.65 TYPE 2 DIABETES MELLITUS WITH HYPERGLYCEMIA, WITH LONG-TERM CURRENT USE OF INSULIN (HCC): Primary | ICD-10-CM

## 2021-07-20 DIAGNOSIS — Z79.4 TYPE 2 DIABETES MELLITUS WITH HYPERGLYCEMIA, WITH LONG-TERM CURRENT USE OF INSULIN (HCC): Primary | ICD-10-CM

## 2021-07-20 PROCEDURE — 99214 OFFICE O/P EST MOD 30 MIN: CPT | Performed by: INTERNAL MEDICINE

## 2021-07-20 RX ORDER — ALPRAZOLAM 0.5 MG/1
TABLET, EXTENDED RELEASE ORAL
COMMUNITY
Start: 2021-04-28

## 2021-07-20 NOTE — PROGRESS NOTES
DATE OF ADMISSION:  09/21/2017    CHIEF COMPLAINT:  Rectal bleeding.    HISTORY OF PRESENT ILLNESS:  The patient is an 82-year-old male patient of the   [facility] with a past medical history significant for diverticulosis,   obesity, obstructive sleep apnea, and non-oxygen dependent COPD, who   presented to the [facility] this morning after he developed bright red blood   per rectum approximately 3 separate episodes that filled the toilet starting   around 4:00 a.m. with associated paleness and dizziness.  At the outside   facility, the patient was found to have hemoglobin of 12, but had an   additional episode of carmen bloody stool with large amount per the report and   became hypotensive.  He was given 3 units of packed red cells and was   intubated for airway protection and transferred to [facility] for higher level of   care.  Upon arrival here, the patient's hemoglobin dropped to 8.5 from 12   despite the 3 units of packed red cells and he required initiation of a   norepinephrine infusion.  He is currently receiving additional aggressive   blood products and remains on the ventilator, awake and following commands.    He denied pain during any of this and has had minimal episodes of bright red   blood per rectum in the past with his diverticulosis, but nothing this severe.    He was in his usual state of health before going to bed last night.  Per   wife, he does not take any blood thinners other than aspirin and Meloxicam and   has not been using anti-inflammatory use.  He does drink alcohol; however,   3-4 glasses of scotch night, but has not had withdrawal in the past when he   stops drinking for approximately a week at a time.  The patient is unable to   provide additional history at the time of this evaluation and this history is   gathered from review of the medical records as well as discussion with the   patient's family at bedside as well as Dr. Singer and the bedside nurse.    PAST MEDICAL  ENDOCRINOLOGY  FOLLOW UP VISIT      Reason for Endocrine Consult/Chief Complaint: DM follow up  ? Medical Decision Making:     Impression  1  Type 2 Diabetes uncontrolled  2  HTN  3  HLD mixed  4  Elevated LFTs - now resolved        Recommendations:  ?   BGs have improved significantly, continue lantus to 36 units qHS, continue glimepiride 2mg qday and Ozempic 0 5mg weekly and metformin 1000mg BID AC    Due for diabetes labs now      HTN- controlled on ACEi-HCTZ continue     HLD- LDL 70, triglycerides 99 March 2021, continue crestor 10mg every other day        Yesika LIMA  Endocrinology           History of Present Illness:   Mr Jose Clarke is a 49year old male who presents for diabetes evaluation-follow up      [facility] was managing diabetes before this         ?  PMH-DM2, HTN, HLD   PSH-appendectomy   FHx-mother with diabetes and MGM with diabetes  SHx-prior smoker, neg x 2, works [employer]  (3pm-11PM shift)     Type of DM: 2  Age of onset: 5 years ago   Microvascular complications: none known   Macrovascular complications:none known         Events since last visit:   on lantus 36 units qHS, glimepiride 2mg qday and Ozempic 0 5mg weekly on fridays and metformin 1000mg BID AC     FBG-low to mid 100s   Premeal/qHS BG-mid 100s  Hypoglycemia- none     Lost more weight now through dieting <200lbs      ? Review of Systems:     Review of Systems   Constitutional: Negative for appetite change, chills, diaphoresis, fatigue, fever and unexpected weight change  HENT: Negative for congestion, ear pain, hearing loss, rhinorrhea, sinus pressure, sinus pain, sore throat, trouble swallowing and voice change  Eyes: Negative for photophobia, redness and visual disturbance  Respiratory: Negative for apnea, cough, chest tightness, shortness of breath, wheezing and stridor  Cardiovascular: Negative for chest pain, palpitations and leg swelling     Gastrointestinal: Negative for HISTORY:  1.  Gastroesophageal reflux disease.  2.  Benign prostatic hypertrophy.  3.  Diabetes.  4.  Aortic stenosis.  5.  Obstructive sleep apnea, on home CPAP.  6.  Vitamin D.  7.  Diverticulosis.  8.  Obesity.  9.  Alcohol use.    ALLERGIES:  No known drug allergies.    OUTPATIENT MEDICATIONS:  Include amlodipine, Meloxicam, ranitidine,   lisinopril, metformin, tamsulosin, metoprolol, betamethasone, gabapentin,   aspirin, chondroitin sulfate, fish oil, and a statin.    PAST SURGICAL HISTORY:  None.    SOCIAL HISTORY:  The patient is  and lives in [place].  He drinks 3-4   glasses of scotch a night, smoked cigars many years ago, but none recently and   does not use illicit drugs.    FAMILY HISTORY:  Noncontributory.    REVIEW OF SYSTEMS:  Please see history of present illness, otherwise unable to   obtain additional given the patient's current clinical condition.    PHYSICAL EXAMINATION:  VITAL SIGNS:  Temperature 36.4 degrees Celsius, heart rate of 87, respiratory   rate of 16 with a vent set rate of 16, oxygen saturation 100% on 60% FiO2, and   a blood pressure of 82/67 on 15 mcg per kilogram per minute of   norepinephrine.  HEENT:  Normocephalic, atraumatic.  Pupils are equal, round and reactive to   light.  No scleral icterus, but moderate conjunctival and oral mucosal pallor.    He has dry oral mucosal membranes with endotracheal tube and a gastric tube   in position with clear aspirate from the stomach.  NECK:  Supple.  Trachea is midline.  There is no stridor.  No jugular venous   distention.  Newly placed right IJ in good position without hematoma.  CARDIOVASCULAR:  Regular rate and rhythm with a holosystolic murmur 4/6.    Cool, pale, delayed capillary refill.  Nondisplaced PMI.  Radial pulses are   thready and currently on norepinephrine infusion.  PULMONARY:  Minimal coarse rhonchi, bilateral bases, without wheeze or   prolonged expiratory effort.  He is breathing comfortably with the ventilator  abdominal distention, abdominal pain, constipation, diarrhea, nausea and vomiting  Endocrine: Negative for cold intolerance, heat intolerance, polydipsia, polyphagia and polyuria  Genitourinary: Negative for difficulty urinating, dysuria, flank pain, frequency, hematuria and urgency  Musculoskeletal: Negative for arthralgias, back pain, gait problem, joint swelling and myalgias  Skin: Negative for color change, pallor, rash and wound  Allergic/Immunologic: Negative for immunocompromised state  Neurological: Negative for dizziness, tremors, syncope, weakness, light-headedness and headaches  Hematological: Negative for adenopathy  Does not bruise/bleed easily  Psychiatric/Behavioral: Negative for confusion and sleep disturbance  The patient is not nervous/anxious  ?   Patient History:     Past Medical History:   Diagnosis Date    Annual physical exam 12/29/2020    Diabetes mellitus ([place] [address] )     Diabetes type 2, uncontrolled ([place] [address] )     Fatty liver     Hyperlipidemia     Hypertension      Past Surgical History:   Procedure Laterality Date    APPENDECTOMY       Social History     Socioeconomic History    Marital status:      Spouse name: Not on file    Number of children: Not on file    Years of education: Not on file    Highest education level: Not on file   Occupational History    Not on file   Tobacco Use    Smoking status: Former Smoker    Smokeless tobacco: Never Used   Vaping Use    Vaping Use: Never used   Substance and Sexual Activity    Alcohol use: Yes     Comment: socially    Drug use: Never    Sexual activity: Not on file   Other Topics Concern    Not on file   Social History Narrative    Not on file     Social Determinants of Health     Financial Resource Strain:     Difficulty of Paying Living Expenses:    Food Insecurity:     Worried About Running Out of Food in the Last Year:     [address] in the Last Year:    Transportation Needs:     Lack of   at a mode of CMV, a set rate of 16, tidal volume of 450, PEEP of 8 and 60%   FiO2.  ABDOMEN:  Distended, nontender, hyperactive bowel sounds.  GENITOURINARY:  Normal external male genitalia.  No rectal bleeding since   arrival to [facility].  EXTREMITIES:  Trace lower extremity edema.  No calf asymmetry nor palpable   cord.  NEUROLOGIC:  He is wide awake, follows commands, moves all extremities without   focal deficits.  SKIN:  Cool, pale, and dry without lesions or rash.  Delayed capillary refill.    LABORATORY DATA:  CBC with a white count of 12,000, hemoglobin of 9, platelets   of 154 with a left shift on the differential.  Chemistry, sodium 134,   potassium 5.2, chloride 117, bicarbonate of 13, BUN of 10, creatinine 0.41,   with a glucose of 188, calcium of 4.8.  AST and ALT normal, alkaline   phosphatase and bilirubin normal, albumin of 1.9, lactic acid of 2.0,   triglycerides of 93.  Troponin less than 0.01.  INR 1.47 with a PTT of 29.    Arterial blood gas, pH of 7.21, pCO2 of 46, PaO2 of 64.    IMAGIN.  Chest x-ray showing emphysematous changes with bibasilar atelectasis,   enlarged mediastinal and cardiac silhouette.  Endotracheal tube, gastric tube   and right IJ in good position without evidence of pneumothorax.  2.  CT angio of the abdomen and pelvis showing no active contrast   extravasation; however, stomach and small bowel partially limited by the   presence of enteric contrast, mild narrowing of the proximal celiac trunk,   colonic diverticulosis with fluid-filled colon, atherosclerotic plaque,   bibasilar opacities representing atelectasis or consolidation and pleural   calcifications.    ASSESSMENT:  1.  Acute respiratory failure given inability to protect airway.  2.  Hemorrhagic shock.  3.  Severe, life threatening likely lower gastrointestinal hemorrhage.    Differential diagnosis includes diverticular bleed versus arteriovenous   malformation versus malignancy.  4.  Platelet dysfunction  Transportation (Medical):  Lack of Transportation (Non-Medical):    Physical Activity:     Days of Exercise per Week:     Minutes of Exercise per Session:    Stress:     Feeling of Stress :    Social Connections:     Frequency of Communication with Friends and Family:     Frequency of Social Gatherings with Friends and Family:     Attends Taoism Services:     Active Member of Clubs or Organizations:     Attends Club or Organization Meetings:     Marital Status:    Intimate Partner Violence:     Fear of Current or Ex-Partner:     Emotionally Abused:     Physically Abused:     Sexually Abused:      Family History   Problem Relation Age of Onset    Diabetes type II Mother     Hypertension Father     Skin cancer Father     Diabetes type II Sister        Current Medications: At the time this note was written these were the medications the patient was on    Current Outpatient Medications   Medication Sig Dispense Refill    ALPRAZolam (XANAX) 0 5 mg tablet Take by mouth as needed for anxiety      Ronald Microlet Lancets lancets Use to test blood sugar twice a day 200 each 3    famotidine (PEPCID) 20 mg tablet Take 20 mg by mouth as needed       glimepiride (AMARYL) 2 mg tablet Take one tablet by mouth daily  (2mg) 90 tablet 1    glucose 4 g chewable tablet Chew 4 tablets (16 g total) as needed for low blood sugar 100 tablet 1    glucose blood (Contour Next Test) test strip Use to test blood sugar twice a aday 200 each 3    insulin glargine (Lantus SoloStar) 100 units/mL injection pen Inject 36 units under the skin every bedtime 45 mL 1    Insulin Pen Needle (BD Pen Needle Melany U/F) 32G X 4 MM MISC USE 1 PEN NEEDLE A DAY TO  ADMINISTER INSULIN UNDER  THE SKIN 90 each 1    lisinopril-hydrochlorothiazide (PRINZIDE,ZESTORETIC) 10-12 5 MG per tablet Take 1 tablet by mouth daily      loratadine (CLARITIN) 10 mg tablet Take 10 mg by mouth daily      metFORMIN (GLUCOPHAGE) 1000 MG tablet Take 1 secondary to aspirin and Meloxicam use.  5.  Leukocytosis, reactive in nature, no obvious sign of infection.  6.  Thrombocytopenia, consumptive in nature.  7.  Hyperkalemia.  8.  Severe hypocalcemia.  9.  Severe metabolic acidosis, partially compensated.  10.  Severe protein calorie malnutrition.  11.  Hypercoagulable state with possible development of DIC pending further   labs.  12.  Diabetes with hyperglycemia.  13.  Gastroesophageal reflux disease.  14.  Obstructive sleep apnea, on home CPAP.  15.  Alcohol abuse without history of withdrawal in the past.  16.  Non-oxygen dependent chronic obstructive pulmonary disease without active   exacerbation.    PLAN:  The patient is critically ill at this time and will be admitted to the   intensive care unit for close observation and monitoring.  He will be   continued on full mechanical ventilatory support with attempts to wean his   inspired oxygen fraction to keep saturations greater than 92%.  We will   recheck an arterial blood gas and modify the ventilator support accordingly.    He is receiving additional blood product resuscitation as well as platelets   and FFP given his hypercoagulable condition and dysfunctional platelets.  A   TEG/platelet mapping is pending to further guide resuscitation strategies.  He   has been seen by gastroenterology as well as Dr. Wagner with general surgery   who is planning on taking him to the operating room for an emergent   hemicolectomy for bleeding control given his ongoing shock and blood loss.    Unfortunately, there was no extravasation seen on CT and embolization at this   time is not an option.  He will be kept comfortable while on the ventilator   and will be kept n.p.o. and started on a Protonix drip in the rare case that   this may be upper related.  We will monitor his electrolytes closely and treat   his hyperkalemia medically as well as replace his significantly low calcium.    Once extubated, he will need to be put  tablet (1,000 mg total) by mouth 2 (two) times a day with meals 180 tablet 1    rosuvastatin (CRESTOR) 10 MG tablet Take 10 mg by mouth daily      Semaglutide,0 25 or 0 5MG/DOS, 2 MG/1 5ML SOPN Inject 0 5 mg under the skin once a week 3 pen 3    Accu-Chek FastClix Lancets MISC Use 2 lancets a day to check blood sugar 204 each 3    ALPRAZolam ER (XANAX XR) 0 5 MG 24 hr tablet  (Patient not taking: Reported on 7/20/2021)      cyclobenzaprine (FLEXERIL) 5 mg tablet  (Patient not taking: Reported on 7/20/2021)      glimepiride (AMARYL) 4 mg tablet TAKE 1 TABLET BY MOUTH  DAILY WITH BREAKFAST 90 tablet 0    glucose blood (Accu-Chek Guide) test strip Use to test blood sugar twice a day 200 each 2     No current facility-administered medications for this visit  Allergies: Bydureon [exenatide]    Physical Exam:   Vital Signs:   /80   Pulse 95   Temp (!) 97 °F (36 1 °C)   Ht 5' 11" (1 803 m)   Wt 89 8 kg (198 lb)   BMI 27 62 kg/m²     Physical Exam  Vitals reviewed  Constitutional:       General: He is not in acute distress  Appearance: Normal appearance  He is not ill-appearing, toxic-appearing or diaphoretic  HENT:      Head: Normocephalic and atraumatic  Right Ear: External ear normal       Left Ear: External ear normal       Nose: Nose normal    Eyes:      General: No scleral icterus  Extraocular Movements: Extraocular movements intact  Conjunctiva/sclera: Conjunctivae normal    Neck:      Comments: No thyromegaly or nodules  Cardiovascular:      Rate and Rhythm: Normal rate and regular rhythm  Heart sounds: Normal heart sounds  No murmur heard  No friction rub  No gallop  Pulmonary:      Effort: Pulmonary effort is normal  No respiratory distress  Breath sounds: Normal breath sounds  No stridor  No wheezing, rhonchi or rales  Abdominal:      General: Bowel sounds are normal  There is no distension  Palpations: Abdomen is soft  There is no mass  Tenderness: There is no abdominal tenderness  There is no guarding or rebound  Hernia: No hernia is present  Musculoskeletal:         General: No swelling  Normal range of motion  Cervical back: Normal range of motion and neck supple  No tenderness  Lymphadenopathy:      Cervical: No cervical adenopathy  Skin:     General: Skin is warm and dry  Coloration: Skin is not pale  Findings: No erythema or rash  Neurological:      General: No focal deficit present  Mental Status: He is alert and oriented to person, place, and time  Psychiatric:         Mood and Affect: Mood normal          Behavior: Behavior normal          Thought Content:  Thought content normal          Judgment: Judgment normal             Labs and Imaging:    No recent labs back on his home CPAP machine and we   will monitor him closely for signs of alcohol withdrawal as well as COPD   flare, although at this time, he is not exhibiting either.  The patient is   critically ill at this time and we appreciate the opportunity to assist in his   care and we will continue to follow along closely with you.    Critical care time 39 minutes.  No time overlap.  Procedures are not included   in this time.    [id number].       ____________________________________     Jeremy Gonda, MD JG / EDU    DD:  09/21/2017 12:07:48  DT:  09/21/2017 13:48:32    D#:  [id number]  Job#:  [id number]

## 2021-07-25 DIAGNOSIS — E11.65 TYPE 2 DIABETES MELLITUS WITH HYPERGLYCEMIA, WITH LONG-TERM CURRENT USE OF INSULIN (HCC): ICD-10-CM

## 2021-07-25 DIAGNOSIS — Z79.4 TYPE 2 DIABETES MELLITUS WITH HYPERGLYCEMIA, WITH LONG-TERM CURRENT USE OF INSULIN (HCC): ICD-10-CM

## 2021-07-26 RX ORDER — SEMAGLUTIDE 1.34 MG/ML
INJECTION, SOLUTION SUBCUTANEOUS
Qty: 4.5 ML | Refills: 3 | Status: SHIPPED | OUTPATIENT
Start: 2021-07-26 | End: 2021-12-22

## 2021-08-02 ENCOUNTER — TELEPHONE (OUTPATIENT)
Dept: ENDOCRINOLOGY | Facility: CLINIC | Age: 56
End: 2021-08-02

## 2021-08-09 ENCOUNTER — APPOINTMENT (OUTPATIENT)
Dept: LAB | Facility: CLINIC | Age: 56
End: 2021-08-09
Payer: COMMERCIAL

## 2021-08-09 ENCOUNTER — TRANSCRIBE ORDERS (OUTPATIENT)
Dept: LAB | Facility: CLINIC | Age: 56
End: 2021-08-09

## 2021-08-09 ENCOUNTER — TELEPHONE (OUTPATIENT)
Dept: ENDOCRINOLOGY | Facility: CLINIC | Age: 56
End: 2021-08-09

## 2021-08-09 DIAGNOSIS — E11.65 TYPE 2 DIABETES MELLITUS WITH HYPERGLYCEMIA, WITH LONG-TERM CURRENT USE OF INSULIN (HCC): ICD-10-CM

## 2021-08-09 DIAGNOSIS — E11.40 DIABETIC NEUROPATHY WITH NEUROLOGIC COMPLICATION (HCC): Primary | ICD-10-CM

## 2021-08-09 DIAGNOSIS — I10 HYPERTENSION GOAL BP (BLOOD PRESSURE) < 140/90: ICD-10-CM

## 2021-08-09 DIAGNOSIS — E78.5 HYPERLIPIDEMIA, UNSPECIFIED HYPERLIPIDEMIA TYPE: ICD-10-CM

## 2021-08-09 DIAGNOSIS — E11.49 DIABETIC NEUROPATHY WITH NEUROLOGIC COMPLICATION (HCC): Primary | ICD-10-CM

## 2021-08-09 DIAGNOSIS — I10 ESSENTIAL HYPERTENSION, BENIGN: ICD-10-CM

## 2021-08-09 DIAGNOSIS — E78.2 MIXED HYPERLIPIDEMIA: ICD-10-CM

## 2021-08-09 DIAGNOSIS — Z79.4 TYPE 2 DIABETES MELLITUS WITH HYPERGLYCEMIA, WITH LONG-TERM CURRENT USE OF INSULIN (HCC): ICD-10-CM

## 2021-08-09 LAB
ALBUMIN SERPL BCP-MCNC: 3.9 G/DL (ref 3.5–5)
ALP SERPL-CCNC: 62 U/L (ref 46–116)
ALT SERPL W P-5'-P-CCNC: 52 U/L (ref 12–78)
ANION GAP SERPL CALCULATED.3IONS-SCNC: 3 MMOL/L (ref 4–13)
AST SERPL W P-5'-P-CCNC: 29 U/L (ref 5–45)
BASOPHILS # BLD AUTO: 0.05 THOUSANDS/ΜL (ref 0–0.1)
BASOPHILS NFR BLD AUTO: 1 % (ref 0–1)
BILIRUB SERPL-MCNC: 1.51 MG/DL (ref 0.2–1)
BUN SERPL-MCNC: 10 MG/DL (ref 5–25)
CALCIUM SERPL-MCNC: 9.5 MG/DL (ref 8.3–10.1)
CHLORIDE SERPL-SCNC: 105 MMOL/L (ref 100–108)
CHOLEST SERPL-MCNC: 130 MG/DL (ref 50–200)
CO2 SERPL-SCNC: 29 MMOL/L (ref 21–32)
CREAT SERPL-MCNC: 0.73 MG/DL (ref 0.6–1.3)
EOSINOPHIL # BLD AUTO: 0.1 THOUSAND/ΜL (ref 0–0.61)
EOSINOPHIL NFR BLD AUTO: 1 % (ref 0–6)
ERYTHROCYTE [DISTWIDTH] IN BLOOD BY AUTOMATED COUNT: 12.9 % (ref 11.6–15.1)
EST. AVERAGE GLUCOSE BLD GHB EST-MCNC: 126 MG/DL
GFR SERPL CREATININE-BSD FRML MDRD: 105 ML/MIN/1.73SQ M
GLUCOSE P FAST SERPL-MCNC: 83 MG/DL (ref 65–99)
HBA1C MFR BLD: 6 %
HCT VFR BLD AUTO: 45.5 % (ref 36.5–49.3)
HDLC SERPL-MCNC: 55 MG/DL
HGB BLD-MCNC: 15.3 G/DL (ref 12–17)
IMM GRANULOCYTES # BLD AUTO: 0.02 THOUSAND/UL (ref 0–0.2)
IMM GRANULOCYTES NFR BLD AUTO: 0 % (ref 0–2)
LDLC SERPL CALC-MCNC: 51 MG/DL (ref 0–100)
LDLC SERPL DIRECT ASSAY-MCNC: 55 MG/DL (ref 0–100)
LYMPHOCYTES # BLD AUTO: 1.49 THOUSANDS/ΜL (ref 0.6–4.47)
LYMPHOCYTES NFR BLD AUTO: 20 % (ref 14–44)
MCH RBC QN AUTO: 31.9 PG (ref 26.8–34.3)
MCHC RBC AUTO-ENTMCNC: 33.6 G/DL (ref 31.4–37.4)
MCV RBC AUTO: 95 FL (ref 82–98)
MONOCYTES # BLD AUTO: 0.92 THOUSAND/ΜL (ref 0.17–1.22)
MONOCYTES NFR BLD AUTO: 12 % (ref 4–12)
NEUTROPHILS # BLD AUTO: 4.95 THOUSANDS/ΜL (ref 1.85–7.62)
NEUTS SEG NFR BLD AUTO: 66 % (ref 43–75)
NONHDLC SERPL-MCNC: 75 MG/DL
NRBC BLD AUTO-RTO: 0 /100 WBCS
PLATELET # BLD AUTO: 240 THOUSANDS/UL (ref 149–390)
PMV BLD AUTO: 11.5 FL (ref 8.9–12.7)
POTASSIUM SERPL-SCNC: 4 MMOL/L (ref 3.5–5.3)
PROT SERPL-MCNC: 7.4 G/DL (ref 6.4–8.2)
PSA SERPL-MCNC: 1.7 NG/ML (ref 0–4)
RBC # BLD AUTO: 4.8 MILLION/UL (ref 3.88–5.62)
SODIUM SERPL-SCNC: 137 MMOL/L (ref 136–145)
TRIGL SERPL-MCNC: 119 MG/DL
WBC # BLD AUTO: 7.53 THOUSAND/UL (ref 4.31–10.16)

## 2021-08-09 PROCEDURE — 85025 COMPLETE CBC W/AUTO DIFF WBC: CPT

## 2021-08-09 PROCEDURE — 80061 LIPID PANEL: CPT

## 2021-08-09 PROCEDURE — 82985 ASSAY OF GLYCATED PROTEIN: CPT

## 2021-08-09 PROCEDURE — 80053 COMPREHEN METABOLIC PANEL: CPT

## 2021-08-09 PROCEDURE — 36415 COLL VENOUS BLD VENIPUNCTURE: CPT

## 2021-08-09 PROCEDURE — G0103 PSA SCREENING: HCPCS

## 2021-08-09 PROCEDURE — 83036 HEMOGLOBIN GLYCOSYLATED A1C: CPT

## 2021-08-09 PROCEDURE — 83721 ASSAY OF BLOOD LIPOPROTEIN: CPT

## 2021-08-10 LAB — FRUCTOSAMINE SERPL-SCNC: 243 UMOL/L (ref 0–285)

## 2021-08-12 NOTE — TELEPHONE ENCOUNTER
Blood sugar log reviewed for Dr Olga Gordillo  Blood sugars are excellent  Continue the same Lantus insulin, Ozempic, metformin, and glimepiride

## 2021-08-23 ENCOUNTER — TELEPHONE (OUTPATIENT)
Dept: ENDOCRINOLOGY | Facility: CLINIC | Age: 56
End: 2021-08-23

## 2021-08-23 NOTE — TELEPHONE ENCOUNTER
Dr Jaylon Matson patient     Noted to have some tight bedtime blood sugars   Please ask patient if he had skipped meals/remembers why these were lower   For now, continue current regimen

## 2021-09-03 ENCOUNTER — OFFICE VISIT (OUTPATIENT)
Dept: PODIATRY | Facility: CLINIC | Age: 56
End: 2021-09-03
Payer: COMMERCIAL

## 2021-09-03 VITALS
SYSTOLIC BLOOD PRESSURE: 130 MMHG | HEART RATE: 95 BPM | RESPIRATION RATE: 17 BRPM | WEIGHT: 198 LBS | BODY MASS INDEX: 27.72 KG/M2 | DIASTOLIC BLOOD PRESSURE: 80 MMHG | HEIGHT: 71 IN

## 2021-09-03 DIAGNOSIS — E11.9 TYPE 2 DIABETES MELLITUS WITHOUT COMPLICATION, WITHOUT LONG-TERM CURRENT USE OF INSULIN (HCC): ICD-10-CM

## 2021-09-03 DIAGNOSIS — M77.41 METATARSALGIA OF BOTH FEET: Primary | ICD-10-CM

## 2021-09-03 DIAGNOSIS — M79.671 RIGHT FOOT PAIN: ICD-10-CM

## 2021-09-03 DIAGNOSIS — B07.0 PLANTAR WARTS: ICD-10-CM

## 2021-09-03 DIAGNOSIS — M77.42 METATARSALGIA OF BOTH FEET: Primary | ICD-10-CM

## 2021-09-03 DIAGNOSIS — B35.9 DERMATOPHYTOSIS: ICD-10-CM

## 2021-09-03 PROCEDURE — 99203 OFFICE O/P NEW LOW 30 MIN: CPT | Performed by: PODIATRIST

## 2021-09-03 PROCEDURE — 17110 DESTRUCTION B9 LES UP TO 14: CPT | Performed by: PODIATRIST

## 2021-09-03 RX ORDER — KETOCONAZOLE 20 MG/G
CREAM TOPICAL DAILY
Qty: 60 G | Refills: 1 | Status: SHIPPED | OUTPATIENT
Start: 2021-09-03 | End: 2021-10-03

## 2021-09-03 NOTE — PROGRESS NOTES
Assessment/Plan: metatarsalgia  Patient with diabetes without complication  Dermatophytosis in the area of the toes  Plantar verruca submetatarsal 1 right foot       plan  Foot exam performed  Patient educated on conditions  Patient educated on care of the diabetic foot  Patient be started on topical antifungal   Today right foot lesion debrided  Cantharone applied  Diagnoses and all orders for this visit:    Metatarsalgia of both feet    Dermatophytosis  -     ketoconazole (NIZORAL) 2 % cream; Apply topically daily    Type 2 diabetes mellitus without complication, without long-term current use of insulin (HCC)    Plantar warts    Right foot pain          Subjective:   Patient is diabetic  Patient complains of pain in the ball of the right foot  Is also concerned with dry skin              Allergies   Allergen Reactions    Bydureon [Exenatide]          Current Outpatient Medications:     Accu-Chek FastClix Lancets MISC, Use 2 lancets a day to check blood sugar, Disp: 204 each, Rfl: 3    ALPRAZolam (XANAX) 0 5 mg tablet, Take by mouth as needed for anxiety, Disp: , Rfl:     ALPRAZolam ER (XANAX XR) 0 5 MG 24 hr tablet, , Disp: , Rfl:     Ronald Microlet Lancets lancets, Use to test blood sugar twice a day, Disp: 200 each, Rfl: 3    cyclobenzaprine (FLEXERIL) 5 mg tablet, , Disp: , Rfl:     famotidine (PEPCID) 20 mg tablet, Take 20 mg by mouth as needed , Disp: , Rfl:     glimepiride (AMARYL) 2 mg tablet, Take one tablet by mouth daily  (2mg), Disp: 90 tablet, Rfl: 1    glucose 4 g chewable tablet, Chew 4 tablets (16 g total) as needed for low blood sugar, Disp: 100 tablet, Rfl: 1    glucose blood (Accu-Chek Guide) test strip, Use to test blood sugar twice a day, Disp: 200 each, Rfl: 2    glucose blood (Contour Next Test) test strip, Use to test blood sugar twice a aday, Disp: 200 each, Rfl: 3    insulin glargine (Lantus SoloStar) 100 units/mL injection pen, Inject 36 units under the skin every bedtime, Disp: 45 mL, Rfl: 1    Insulin Pen Needle (BD Pen Needle Melany U/F) 32G X 4 MM MISC, USE 1 PEN NEEDLE A DAY TO  ADMINISTER INSULIN UNDER  THE SKIN, Disp: 90 each, Rfl: 1    ketoconazole (NIZORAL) 2 % cream, Apply topically daily, Disp: 60 g, Rfl: 1    lisinopril-hydrochlorothiazide (PRINZIDE,ZESTORETIC) 10-12 5 MG per tablet, Take 1 tablet by mouth daily, Disp: , Rfl:     loratadine (CLARITIN) 10 mg tablet, Take 10 mg by mouth daily, Disp: , Rfl:     metFORMIN (GLUCOPHAGE) 1000 MG tablet, TAKE 1 TABLET BY MOUTH  TWICE DAILY WITH MEALS, Disp: 180 tablet, Rfl: 3    Ozempic, 0 25 or 0 5 MG/DOSE, 2 MG/1 5ML SOPN, INJECT 0 5 MG  SUBCUTANEOUSLY WEEKLY, Disp: 4 5 mL, Rfl: 3    rosuvastatin (CRESTOR) 10 MG tablet, Take 10 mg by mouth daily, Disp: , Rfl:     Patient Active Problem List   Diagnosis    Type 2 diabetes mellitus without complication, without long-term current use of insulin ([facility])    Essential hypertension    Hyperlipidemia          Patient ID: Iva Gonzalez is a 48 y o  male  HPI    The following portions of the patient's history were reviewed and updated as appropriate: He  has a past medical history of Annual physical exam (12/29/2020), Diabetes mellitus ([address] ), Diabetes type 2, uncontrolled ([address] ), Fatty liver, Hyperlipidemia, and Hypertension  He   Patient Active Problem List    Diagnosis Date Noted    Type 2 diabetes mellitus without complication, without long-term current use of insulin ([address] ) 09/01/2020    Essential hypertension 09/01/2020    Hyperlipidemia 09/01/2020     He  has a past surgical history that includes Appendectomy  His family history includes Diabetes type II in his mother and sister; Hypertension in his father; Skin cancer in his father  He  reports that he has quit smoking  He has never used smokeless tobacco  He reports current alcohol use  He reports that he does not use drugs    Current Outpatient Medications   Medication Sig Dispense Refill    Accu-Chek FastClix Lancets MISC Use 2 lancets a day to check blood sugar 204 each 3    ALPRAZolam (XANAX) 0 5 mg tablet Take by mouth as needed for anxiety      ALPRAZolam ER (XANAX XR) 0 5 MG 24 hr tablet  (Patient not taking: Reported on 7/20/2021)      Ronald Microlet Lancets lancets Use to test blood sugar twice a day 200 each 3    cyclobenzaprine (FLEXERIL) 5 mg tablet  (Patient not taking: Reported on 7/20/2021)      famotidine (PEPCID) 20 mg tablet Take 20 mg by mouth as needed       glimepiride (AMARYL) 2 mg tablet Take one tablet by mouth daily  (2mg) 90 tablet 1    glucose 4 g chewable tablet Chew 4 tablets (16 g total) as needed for low blood sugar 100 tablet 1    glucose blood (Accu-Chek Guide) test strip Use to test blood sugar twice a day 200 each 2    glucose blood (Contour Next Test) test strip Use to test blood sugar twice a aday 200 each 3    insulin glargine (Lantus SoloStar) 100 units/mL injection pen Inject 36 units under the skin every bedtime 45 mL 1    Insulin Pen Needle (BD Pen Needle Melany U/F) 32G X 4 MM MISC USE 1 PEN NEEDLE A DAY TO  ADMINISTER INSULIN UNDER  THE SKIN 90 each 1    ketoconazole (NIZORAL) 2 % cream Apply topically daily 60 g 1    lisinopril-hydrochlorothiazide (PRINZIDE,ZESTORETIC) 10-12 5 MG per tablet Take 1 tablet by mouth daily      loratadine (CLARITIN) 10 mg tablet Take 10 mg by mouth daily      metFORMIN (GLUCOPHAGE) 1000 MG tablet TAKE 1 TABLET BY MOUTH  TWICE DAILY WITH MEALS 180 tablet 3    Ozempic, 0 25 or 0 5 MG/DOSE, 2 MG/1 5ML SOPN INJECT 0 5 MG  SUBCUTANEOUSLY WEEKLY 4 5 mL 3    rosuvastatin (CRESTOR) 10 MG tablet Take 10 mg by mouth daily       No current facility-administered medications for this visit       Current Outpatient Medications on File Prior to Visit   Medication Sig    Accu-Chek FastClix Lancets MISC Use 2 lancets a day to check blood sugar    ALPRAZolam (XANAX) 0 5 mg tablet Take by mouth as needed for anxiety    ALPRAZolam ER (XANAX XR) 0 5 MG 24 hr tablet  (Patient not taking: Reported on 7/20/2021)    Ronald Microlet Lancets lancets Use to test blood sugar twice a day    cyclobenzaprine (FLEXERIL) 5 mg tablet  (Patient not taking: Reported on 7/20/2021)    famotidine (PEPCID) 20 mg tablet Take 20 mg by mouth as needed     glimepiride (AMARYL) 2 mg tablet Take one tablet by mouth daily  (2mg)    glucose 4 g chewable tablet Chew 4 tablets (16 g total) as needed for low blood sugar    glucose blood (Accu-Chek Guide) test strip Use to test blood sugar twice a day    glucose blood (Contour Next Test) test strip Use to test blood sugar twice a aday    insulin glargine (Lantus SoloStar) 100 units/mL injection pen Inject 36 units under the skin every bedtime    Insulin Pen Needle (BD Pen Needle Melany U/F) 32G X 4 MM MISC USE 1 PEN NEEDLE A DAY TO  ADMINISTER INSULIN UNDER  THE SKIN    lisinopril-hydrochlorothiazide (PRINZIDE,ZESTORETIC) 10-12 5 MG per tablet Take 1 tablet by mouth daily    loratadine (CLARITIN) 10 mg tablet Take 10 mg by mouth daily    metFORMIN (GLUCOPHAGE) 1000 MG tablet TAKE 1 TABLET BY MOUTH  TWICE DAILY WITH MEALS    Ozempic, 0 25 or 0 5 MG/DOSE, 2 MG/1 5ML SOPN INJECT 0 5 MG  SUBCUTANEOUSLY WEEKLY    rosuvastatin (CRESTOR) 10 MG tablet Take 10 mg by mouth daily     No current facility-administered medications on file prior to visit  He is allergic to bydureon [exenatide]       Vitals:    09/03/21 1410   BP: 130/80   Pulse: 95   Resp: 17       Review of Systems      Objective:  Patient's shoes and socks removed  Foot Exam    General  General Appearance: appears stated age and healthy   Orientation: alert and oriented to person, place, and time   Affect: appropriate       Right Foot/Ankle     Inspection and Palpation  Tenderness: metatarsals   Swelling: dorsum   Arch: pes cavus  Hammertoes: fifth toe  Hallux limitus: yes  Skin Exam: dry skin, tinea and skin changes;      Neurovascular  Dorsalis pedis: 3+  Posterior tibial: 3+  Saphenous nerve sensation: normal  Tibial nerve sensation: normal  Superficial peroneal nerve sensation: normal  Deep peroneal nerve sensation: normal  Sural nerve sensation: normal      Left Foot/Ankle      Inspection and Palpation  Tenderness: metatarsals   Swelling: dorsum   Arch: pes cavus  Hammertoes: fifth toe  Hallux limitus: yes  Skin Exam: dry skin, tinea and skin changes; Neurovascular  Dorsalis pedis: 3+  Posterior tibial: 3+  Saphenous nerve sensation: normal  Tibial nerve sensation: normal  Superficial peroneal nerve sensation: normal  Deep peroneal nerve sensation: normal  Sural nerve sensation: normal        Physical Exam  Vitals and nursing note reviewed  Constitutional:       Appearance: Normal appearance  Cardiovascular:      Rate and Rhythm: Normal rate and regular rhythm  Pulses: no weak pulses          Dorsalis pedis pulses are 3+ on the right side and 3+ on the left side  Posterior tibial pulses are 3+ on the right side and 3+ on the left side  Feet:      Right foot:      Skin integrity: Dry skin present  Left foot:      Skin integrity: Dry skin present  Skin:     Capillary Refill: Capillary refill takes less than 2 seconds  Comments:   Patient has xerosis of skin  He has periungual eczema/dermatophytosis  Right foot demonstrates plantar skin lesion  Submetatarsal 1 of the right foot demonstrates 0 75 centimeter squared atypical verruca  Neurological:      Mental Status: He is alert  Psychiatric:         Mood and Affect: Mood normal          Behavior: Behavior normal          Thought Content: Thought content normal          Judgment: Judgment normal      Patient's shoes and socks removed  Right Foot/Ankle   Right Foot Inspection  Skin Exam: dry skin                              Vascular    The right DP pulse is 3+  The right PT pulse is 3+     Right Toe  - Comprehensive Exam  Arch: pes cavus  Hammertoes: fifth toe  Hallux limitus: yes  Swelling: dorsum   Tenderness: metatarsals         Left Foot/Ankle  Left Foot Inspection  Skin Exam: dry skin                                           Vascular    The left DP pulse is 3+  The left PT pulse is 3+  Left Toe  - Comprehensive Exam  Arch: pes cavus  Hammertoes: fifth toe  Hallux limitus: yes  Swelling: dorsum   Tenderness: metatarsals       Assign Risk Category:  Deformity present; No loss of protective sensation;  No weak pulses       Risk: 1

## 2021-09-07 ENCOUNTER — TELEPHONE (OUTPATIENT)
Dept: ENDOCRINOLOGY | Facility: CLINIC | Age: 56
End: 2021-09-07

## 2021-09-15 ENCOUNTER — TELEPHONE (OUTPATIENT)
Dept: ENDOCRINOLOGY | Facility: CLINIC | Age: 56
End: 2021-09-15

## 2021-09-17 ENCOUNTER — OFFICE VISIT (OUTPATIENT)
Dept: PODIATRY | Facility: CLINIC | Age: 56
End: 2021-09-17
Payer: COMMERCIAL

## 2021-09-17 VITALS — BODY MASS INDEX: 27.72 KG/M2 | RESPIRATION RATE: 17 BRPM | WEIGHT: 198 LBS | HEIGHT: 71 IN

## 2021-09-17 DIAGNOSIS — B07.0 PLANTAR WARTS: Primary | ICD-10-CM

## 2021-09-17 DIAGNOSIS — M79.671 RIGHT FOOT PAIN: ICD-10-CM

## 2021-09-17 DIAGNOSIS — E11.9 TYPE 2 DIABETES MELLITUS WITHOUT COMPLICATION, WITHOUT LONG-TERM CURRENT USE OF INSULIN (HCC): ICD-10-CM

## 2021-09-17 DIAGNOSIS — B35.9 DERMATOPHYTOSIS: ICD-10-CM

## 2021-09-17 PROCEDURE — 99212 OFFICE O/P EST SF 10 MIN: CPT | Performed by: PODIATRIST

## 2021-09-17 PROCEDURE — 17110 DESTRUCTION B9 LES UP TO 14: CPT | Performed by: PODIATRIST

## 2021-09-17 NOTE — PROGRESS NOTES
Assessment/Plan:   Patient with diabetes  Metatarsalgia  Verruca right foot  Resolving dermatophytosis  Pain upon ambulation     plan  Foot exam performed  Right foot lesion debrided  Cantharone reapplied  Patient advised on condition  All nails debrided without pain or complication  Patient will use topical cream as directed  He will watch for signs of infection  Diagnoses and all orders for this visit:    Plantar warts    Right foot pain    Dermatophytosis    Type 2 diabetes mellitus without complication, without long-term current use of insulin ([facility])          Subjective:   Patient presents for evaluation  He had minimal pain with Cantharone treatment  He is using cream as directed    Patient is diabetic    Allergies   Allergen Reactions    Bydureon [Exenatide]          Current Outpatient Medications:     Accu-Chek FastClix Lancets MISC, Use 2 lancets a day to check blood sugar, Disp: 204 each, Rfl: 3    ALPRAZolam (XANAX) 0 5 mg tablet, Take by mouth as needed for anxiety, Disp: , Rfl:     ALPRAZolam ER (XANAX XR) 0 5 MG 24 hr tablet, , Disp: , Rfl:     Ronald Microlet Lancets lancets, Use to test blood sugar twice a day, Disp: 200 each, Rfl: 3    cyclobenzaprine (FLEXERIL) 5 mg tablet, , Disp: , Rfl:     famotidine (PEPCID) 20 mg tablet, Take 20 mg by mouth as needed , Disp: , Rfl:     glimepiride (AMARYL) 2 mg tablet, Take one tablet by mouth daily  (2mg), Disp: 90 tablet, Rfl: 1    glucose 4 g chewable tablet, Chew 4 tablets (16 g total) as needed for low blood sugar, Disp: 100 tablet, Rfl: 1    glucose blood (Accu-Chek Guide) test strip, Use to test blood sugar twice a day, Disp: 200 each, Rfl: 2    glucose blood (Contour Next Test) test strip, Use to test blood sugar twice a aday, Disp: 200 each, Rfl: 3    insulin glargine (Lantus SoloStar) 100 units/mL injection pen, Inject 36 units under the skin every bedtime, Disp: 45 mL, Rfl: 1    Insulin Pen Needle (BD Pen Needle Melany U/F) 32G X 4 MM MISC, USE 1 PEN NEEDLE A DAY TO  ADMINISTER INSULIN UNDER  THE SKIN, Disp: 90 each, Rfl: 1    ketoconazole (NIZORAL) 2 % cream, Apply topically daily, Disp: 60 g, Rfl: 1    lisinopril-hydrochlorothiazide (PRINZIDE,ZESTORETIC) 10-12 5 MG per tablet, Take 1 tablet by mouth daily, Disp: , Rfl:     loratadine (CLARITIN) 10 mg tablet, Take 10 mg by mouth daily, Disp: , Rfl:     metFORMIN (GLUCOPHAGE) 1000 MG tablet, TAKE 1 TABLET BY MOUTH  TWICE DAILY WITH MEALS, Disp: 180 tablet, Rfl: 3    Ozempic, 0 25 or 0 5 MG/DOSE, 2 MG/1 5ML SOPN, INJECT 0 5 MG  SUBCUTANEOUSLY WEEKLY, Disp: 4 5 mL, Rfl: 3    rosuvastatin (CRESTOR) 10 MG tablet, Take 10 mg by mouth daily, Disp: , Rfl:     Patient Active Problem List   Diagnosis    Type 2 diabetes mellitus without complication, without long-term current use of insulin (HCC)    Essential hypertension    Hyperlipidemia          Patient ID: Dicie Denver is a 48 y o  male  HPI    The following portions of the patient's history were reviewed and updated as appropriate:     family history includes Diabetes type II in his mother and sister; Hypertension in his father; Skin cancer in his father  reports that he has quit smoking  He has never used smokeless tobacco  He reports current alcohol use  He reports that he does not use drugs  Vitals:    09/17/21 1154   Resp: 17       Review of Systems      Objective:  Patient's shoes and socks removed     Foot ExamPhysical Exam      Foot Exam     General  General Appearance: appears stated age and healthy   Orientation: alert and oriented to person, place, and time   Affect: appropriate         Right Foot/Ankle      Inspection and Palpation  Tenderness: metatarsals   Swelling: dorsum   Arch: pes cavus  Hammertoes: fifth toe  Hallux limitus: yes  Skin Exam: dry skin, tinea and skin changes;      Neurovascular  Dorsalis pedis: 3+  Posterior tibial: 3+  Saphenous nerve sensation: normal  Tibial nerve sensation: normal  Superficial peroneal nerve sensation: normal  Deep peroneal nerve sensation: normal  Sural nerve sensation: normal        Left Foot/Ankle       Inspection and Palpation  Tenderness: metatarsals   Swelling: dorsum   Arch: pes cavus  Hammertoes: fifth toe  Hallux limitus: yes  Skin Exam: dry skin, tinea and skin changes;      Neurovascular  Dorsalis pedis: 3+  Posterior tibial: 3+  Saphenous nerve sensation: normal  Tibial nerve sensation: normal  Superficial peroneal nerve sensation: normal  Deep peroneal nerve sensation: normal  Sural nerve sensation: normal           Physical Exam  Vitals and nursing note reviewed  Constitutional:       Appearance: Normal appearance  Cardiovascular:      Rate and Rhythm: Normal rate and regular rhythm  Pulses: no weak pulses          Dorsalis pedis pulses are 3+ on the right side and 3+ on the left side  Posterior tibial pulses are 3+ on the right side and 3+ on the left side  Feet:      Right foot:      Skin integrity: Dry skin present  Left foot:      Skin integrity: Dry skin present  Skin:     Capillary Refill: Capillary refill takes less than 2 seconds  Comments:   Patient has xerosis of skin  He has periungual eczema/dermatophytosis  Right foot demonstrates plantar skin lesion  Submetatarsal 1 of the right foot demonstrates 0 75 centimeter squared atypical verruca  Lesion is contained within a bullae  This was debrided  Approximately 20% remaining  Neurological:      Mental Status: He is alert  Psychiatric:         Mood and Affect: Mood normal          Behavior: Behavior normal          Thought Content: Thought content normal          Judgment: Judgment normal       Patient's shoes and socks removed  Right Foot/Ankle   Right Foot Inspection  Skin Exam: dry skin                                 Vascular     The right DP pulse is 3+  The right PT pulse is 3+     Right Toe  - Comprehensive Exam  Arch: pes cavus  Hammertoes: fifth toe  Hallux limitus: yes  Swelling: dorsum   Tenderness: metatarsals            Left Foot/Ankle  Left Foot Inspection  Skin Exam: dry skin                                             Vascular     The left DP pulse is 3+  The left PT pulse is 3+  Left Toe  - Comprehensive Exam  Arch: pes cavus  Hammertoes: fifth toe  Hallux limitus: yes  Swelling: dorsum   Tenderness: metatarsals         Assign Risk Category:  Deformity present; No loss of protective sensation;  No weak pulses       Risk: 1

## 2021-09-20 ENCOUNTER — TELEPHONE (OUTPATIENT)
Dept: ENDOCRINOLOGY | Facility: CLINIC | Age: 56
End: 2021-09-20

## 2021-09-22 ENCOUNTER — HOSPITAL ENCOUNTER (OUTPATIENT)
Dept: RADIOLOGY | Facility: HOSPITAL | Age: 56
Discharge: HOME | End: 2021-09-22
Payer: COMMERCIAL

## 2021-09-22 DIAGNOSIS — M54.2 NECK PAIN: ICD-10-CM

## 2021-09-22 PROCEDURE — 72141 MRI NECK SPINE W/O DYE: CPT

## 2021-10-01 ENCOUNTER — OFFICE VISIT (OUTPATIENT)
Dept: PODIATRY | Facility: CLINIC | Age: 56
End: 2021-10-01
Payer: COMMERCIAL

## 2021-10-01 VITALS — HEIGHT: 71 IN | WEIGHT: 198 LBS | BODY MASS INDEX: 27.72 KG/M2 | RESPIRATION RATE: 16 BRPM

## 2021-10-01 DIAGNOSIS — E11.9 TYPE 2 DIABETES MELLITUS WITHOUT COMPLICATION, WITHOUT LONG-TERM CURRENT USE OF INSULIN (HCC): ICD-10-CM

## 2021-10-01 DIAGNOSIS — M79.671 RIGHT FOOT PAIN: ICD-10-CM

## 2021-10-01 DIAGNOSIS — S91.301A OPEN WOUND OF RIGHT FOOT, INITIAL ENCOUNTER: Primary | ICD-10-CM

## 2021-10-01 PROCEDURE — 99213 OFFICE O/P EST LOW 20 MIN: CPT | Performed by: PODIATRIST

## 2021-10-04 DIAGNOSIS — E11.65 TYPE 2 DIABETES MELLITUS WITH HYPERGLYCEMIA, WITH LONG-TERM CURRENT USE OF INSULIN (HCC): ICD-10-CM

## 2021-10-04 DIAGNOSIS — Z79.4 TYPE 2 DIABETES MELLITUS WITH HYPERGLYCEMIA, WITH LONG-TERM CURRENT USE OF INSULIN (HCC): ICD-10-CM

## 2021-10-06 RX ORDER — INSULIN GLARGINE 100 [IU]/ML
INJECTION, SOLUTION SUBCUTANEOUS
Qty: 45 ML | Refills: 1
Start: 2021-10-06 | End: 2021-12-21

## 2021-10-18 ENCOUNTER — TELEPHONE (OUTPATIENT)
Dept: ENDOCRINOLOGY | Facility: CLINIC | Age: 56
End: 2021-10-18

## 2021-10-29 DIAGNOSIS — I10 HYPERTENSION GOAL BP (BLOOD PRESSURE) < 140/90: ICD-10-CM

## 2021-10-29 DIAGNOSIS — E11.65 TYPE 2 DIABETES MELLITUS WITH HYPERGLYCEMIA, WITHOUT LONG-TERM CURRENT USE OF INSULIN (HCC): ICD-10-CM

## 2021-10-29 DIAGNOSIS — E78.2 MIXED HYPERLIPIDEMIA: ICD-10-CM

## 2021-11-01 ENCOUNTER — TELEPHONE (OUTPATIENT)
Dept: ENDOCRINOLOGY | Facility: CLINIC | Age: 56
End: 2021-11-01

## 2021-11-01 RX ORDER — PEN NEEDLE, DIABETIC 32GX 5/32"
NEEDLE, DISPOSABLE MISCELLANEOUS
Qty: 90 EACH | Refills: 1 | Status: SHIPPED | OUTPATIENT
Start: 2021-11-01 | End: 2022-05-02

## 2021-11-16 ENCOUNTER — TELEPHONE (OUTPATIENT)
Dept: ENDOCRINOLOGY | Facility: CLINIC | Age: 56
End: 2021-11-16

## 2021-11-24 DIAGNOSIS — I10 HYPERTENSION GOAL BP (BLOOD PRESSURE) < 140/90: ICD-10-CM

## 2021-11-24 DIAGNOSIS — E78.2 MIXED HYPERLIPIDEMIA: ICD-10-CM

## 2021-11-24 DIAGNOSIS — E11.65 TYPE 2 DIABETES MELLITUS WITH HYPERGLYCEMIA, WITHOUT LONG-TERM CURRENT USE OF INSULIN (HCC): ICD-10-CM

## 2021-11-24 RX ORDER — GLIMEPIRIDE 2 MG/1
TABLET ORAL
Qty: 90 TABLET | Refills: 3 | Status: SHIPPED | OUTPATIENT
Start: 2021-11-24 | End: 2021-12-22

## 2021-11-29 ENCOUNTER — TELEPHONE (OUTPATIENT)
Dept: ENDOCRINOLOGY | Facility: CLINIC | Age: 56
End: 2021-11-29

## 2021-12-19 DIAGNOSIS — Z79.4 TYPE 2 DIABETES MELLITUS WITH HYPERGLYCEMIA, WITH LONG-TERM CURRENT USE OF INSULIN (HCC): ICD-10-CM

## 2021-12-19 DIAGNOSIS — E11.65 TYPE 2 DIABETES MELLITUS WITH HYPERGLYCEMIA, WITH LONG-TERM CURRENT USE OF INSULIN (HCC): ICD-10-CM

## 2021-12-21 ENCOUNTER — APPOINTMENT (OUTPATIENT)
Dept: LAB | Facility: CLINIC | Age: 56
End: 2021-12-21
Payer: COMMERCIAL

## 2021-12-21 DIAGNOSIS — Z79.4 TYPE 2 DIABETES MELLITUS WITH HYPERGLYCEMIA, WITH LONG-TERM CURRENT USE OF INSULIN (HCC): ICD-10-CM

## 2021-12-21 DIAGNOSIS — E78.2 MIXED HYPERLIPIDEMIA: ICD-10-CM

## 2021-12-21 DIAGNOSIS — E11.65 TYPE 2 DIABETES MELLITUS WITH HYPERGLYCEMIA, WITH LONG-TERM CURRENT USE OF INSULIN (HCC): ICD-10-CM

## 2021-12-21 DIAGNOSIS — I10 HYPERTENSION GOAL BP (BLOOD PRESSURE) < 140/90: ICD-10-CM

## 2021-12-21 LAB
ALBUMIN SERPL BCP-MCNC: 4.1 G/DL (ref 3.5–5)
ALP SERPL-CCNC: 71 U/L (ref 46–116)
ALT SERPL W P-5'-P-CCNC: 45 U/L (ref 12–78)
ANION GAP SERPL CALCULATED.3IONS-SCNC: 4 MMOL/L (ref 4–13)
AST SERPL W P-5'-P-CCNC: 27 U/L (ref 5–45)
BILIRUB SERPL-MCNC: 2.17 MG/DL (ref 0.2–1)
BUN SERPL-MCNC: 11 MG/DL (ref 5–25)
CALCIUM SERPL-MCNC: 10.5 MG/DL (ref 8.3–10.1)
CHLORIDE SERPL-SCNC: 103 MMOL/L (ref 100–108)
CHOLEST SERPL-MCNC: 136 MG/DL
CO2 SERPL-SCNC: 31 MMOL/L (ref 21–32)
CREAT SERPL-MCNC: 0.83 MG/DL (ref 0.6–1.3)
EST. AVERAGE GLUCOSE BLD GHB EST-MCNC: 134 MG/DL
GFR SERPL CREATININE-BSD FRML MDRD: 99 ML/MIN/1.73SQ M
GLUCOSE P FAST SERPL-MCNC: 107 MG/DL (ref 65–99)
HBA1C MFR BLD: 6.3 %
HDLC SERPL-MCNC: 47 MG/DL
LDLC SERPL CALC-MCNC: 64 MG/DL (ref 0–100)
NONHDLC SERPL-MCNC: 89 MG/DL
POTASSIUM SERPL-SCNC: 4.1 MMOL/L (ref 3.5–5.3)
PROT SERPL-MCNC: 7.4 G/DL (ref 6.4–8.2)
SODIUM SERPL-SCNC: 138 MMOL/L (ref 136–145)
TRIGL SERPL-MCNC: 127 MG/DL

## 2021-12-21 PROCEDURE — 83036 HEMOGLOBIN GLYCOSYLATED A1C: CPT

## 2021-12-21 PROCEDURE — 36415 COLL VENOUS BLD VENIPUNCTURE: CPT

## 2021-12-21 PROCEDURE — 80061 LIPID PANEL: CPT

## 2021-12-21 PROCEDURE — 80053 COMPREHEN METABOLIC PANEL: CPT

## 2021-12-21 RX ORDER — INSULIN GLARGINE 100 [IU]/ML
INJECTION, SOLUTION SUBCUTANEOUS
Qty: 45 ML | Refills: 3 | Status: SHIPPED | OUTPATIENT
Start: 2021-12-21 | End: 2021-12-22

## 2021-12-22 ENCOUNTER — OFFICE VISIT (OUTPATIENT)
Dept: ENDOCRINOLOGY | Facility: CLINIC | Age: 56
End: 2021-12-22
Payer: COMMERCIAL

## 2021-12-22 VITALS
SYSTOLIC BLOOD PRESSURE: 112 MMHG | TEMPERATURE: 97 F | WEIGHT: 204 LBS | BODY MASS INDEX: 28.56 KG/M2 | HEIGHT: 71 IN | DIASTOLIC BLOOD PRESSURE: 70 MMHG | HEART RATE: 88 BPM

## 2021-12-22 DIAGNOSIS — E83.52 HYPERCALCEMIA: ICD-10-CM

## 2021-12-22 DIAGNOSIS — E11.9 TYPE 2 DIABETES MELLITUS WITHOUT COMPLICATION, WITH LONG-TERM CURRENT USE OF INSULIN (HCC): Primary | ICD-10-CM

## 2021-12-22 DIAGNOSIS — E11.65 TYPE 2 DIABETES MELLITUS WITH HYPERGLYCEMIA, WITH LONG-TERM CURRENT USE OF INSULIN (HCC): ICD-10-CM

## 2021-12-22 DIAGNOSIS — I10 ESSENTIAL HYPERTENSION: ICD-10-CM

## 2021-12-22 DIAGNOSIS — E78.2 MIXED HYPERLIPIDEMIA: ICD-10-CM

## 2021-12-22 DIAGNOSIS — Z79.4 TYPE 2 DIABETES MELLITUS WITH HYPERGLYCEMIA, WITH LONG-TERM CURRENT USE OF INSULIN (HCC): ICD-10-CM

## 2021-12-22 DIAGNOSIS — Z79.4 TYPE 2 DIABETES MELLITUS WITHOUT COMPLICATION, WITH LONG-TERM CURRENT USE OF INSULIN (HCC): Primary | ICD-10-CM

## 2021-12-22 PROCEDURE — 99214 OFFICE O/P EST MOD 30 MIN: CPT | Performed by: INTERNAL MEDICINE

## 2021-12-22 RX ORDER — INSULIN GLARGINE 100 [IU]/ML
INJECTION, SOLUTION SUBCUTANEOUS
Qty: 45 ML | Refills: 3 | Status: SHIPPED | OUTPATIENT
Start: 2021-12-22

## 2021-12-22 RX ORDER — SEMAGLUTIDE 1.34 MG/ML
1 INJECTION, SOLUTION SUBCUTANEOUS WEEKLY
Qty: 9 ML | Refills: 3 | Status: SHIPPED | OUTPATIENT
Start: 2021-12-22

## 2022-01-01 NOTE — TELEPHONE ENCOUNTER
Please call patient back he left a message stating he is having issues with his medications being covered (he didn't give specifics) 30-Apr-2021

## 2022-02-02 ENCOUNTER — OFFICE VISIT (OUTPATIENT)
Dept: PODIATRY | Facility: CLINIC | Age: 57
End: 2022-02-02
Payer: COMMERCIAL

## 2022-02-02 VITALS
WEIGHT: 204 LBS | SYSTOLIC BLOOD PRESSURE: 112 MMHG | DIASTOLIC BLOOD PRESSURE: 70 MMHG | BODY MASS INDEX: 28.56 KG/M2 | HEIGHT: 71 IN | RESPIRATION RATE: 17 BRPM

## 2022-02-02 DIAGNOSIS — L03.031 PARONYCHIA OF TOENAIL OF RIGHT FOOT: ICD-10-CM

## 2022-02-02 DIAGNOSIS — L03.031 CELLULITIS OF TOE, RIGHT: Primary | ICD-10-CM

## 2022-02-02 DIAGNOSIS — E11.9 TYPE 2 DIABETES MELLITUS WITHOUT COMPLICATION, WITHOUT LONG-TERM CURRENT USE OF INSULIN (HCC): ICD-10-CM

## 2022-02-02 DIAGNOSIS — M79.671 RIGHT FOOT PAIN: ICD-10-CM

## 2022-02-02 PROCEDURE — 99213 OFFICE O/P EST LOW 20 MIN: CPT | Performed by: PODIATRIST

## 2022-02-02 RX ORDER — AZITHROMYCIN 250 MG/1
TABLET, FILM COATED ORAL
Qty: 6 TABLET | Refills: 0 | Status: SHIPPED | OUTPATIENT
Start: 2022-02-02 | End: 2022-02-06

## 2022-02-02 NOTE — PROGRESS NOTES
Assessment/Plan:  Cellulitis right hallux  Diabetic patient  Paronychia  Pain right big toe    Plan  Foot exam performed  Area debrided  Gentian violet applied  Patient be placed on Zithromax  Return p r n  Diagnoses and all orders for this visit:    Cellulitis of toe, right  -     azithromycin (ZITHROMAX) 250 mg tablet; Take 2 tablets today then 1 tablet daily x 4 days    Right foot pain    Paronychia of toenail of right foot  -     azithromycin (ZITHROMAX) 250 mg tablet; Take 2 tablets today then 1 tablet daily x 4 days    Type 2 diabetes mellitus without complication, without long-term current use of insulin ([facility])          Subjective:  Urgent visit  Patient recently cut his toenail  Now he has pain in his right big toe  Patient is diabetic      Allergies   Allergen Reactions    Bydureon [Exenatide]          Current Outpatient Medications:     Accu-Chek FastClix Lancets MISC, Use 2 lancets a day to check blood sugar, Disp: 204 each, Rfl: 3    ALPRAZolam (XANAX) 0 5 mg tablet, Take by mouth as needed for anxiety, Disp: , Rfl:     ALPRAZolam ER (XANAX XR) 0 5 MG 24 hr tablet, , Disp: , Rfl:     azithromycin (ZITHROMAX) 250 mg tablet, Take 2 tablets today then 1 tablet daily x 4 days, Disp: 6 tablet, Rfl: 0    Ronald Microlet Lancets lancets, Use to test blood sugar twice a day, Disp: 200 each, Rfl: 3    cyclobenzaprine (FLEXERIL) 5 mg tablet, , Disp: , Rfl:     famotidine (PEPCID) 20 mg tablet, Take 20 mg by mouth as needed , Disp: , Rfl:     glucose 4 g chewable tablet, Chew 4 tablets (16 g total) as needed for low blood sugar, Disp: 100 tablet, Rfl: 1    glucose blood (Accu-Chek Guide) test strip, Use to test blood sugar twice a day, Disp: 200 each, Rfl: 2    glucose blood (Contour Next Test) test strip, Use to test blood sugar twice a aday, Disp: 200 each, Rfl: 3    insulin glargine (Lantus SoloStar) 100 units/mL injection pen, INJECT SUBCUTANEOUSLY 25  UNITS DAILY AT BEDTIME, Disp: 45 mL, Rfl: 3    Insulin Pen Needle (BD Pen Needle Melany U/F) 32G X 4 MM MISC, USE TO INJECT  SUBCUTANEOUSLY ONCE DAILY  TO ADMINISTER INSULIN, Disp: 90 each, Rfl: 1    ketoconazole (NIZORAL) 2 % cream, Apply topically daily, Disp: 60 g, Rfl: 1    lisinopril-hydrochlorothiazide (PRINZIDE,ZESTORETIC) 10-12 5 MG per tablet, Take 1 tablet by mouth daily, Disp: , Rfl:     loratadine (CLARITIN) 10 mg tablet, Take 10 mg by mouth daily, Disp: , Rfl:     metFORMIN (GLUCOPHAGE) 1000 MG tablet, TAKE 1 TABLET BY MOUTH  TWICE DAILY WITH MEALS, Disp: 180 tablet, Rfl: 3    rosuvastatin (CRESTOR) 10 MG tablet, Take 10 mg by mouth daily, Disp: , Rfl:     semaglutide, 1 mg/dose, (Ozempic, 1 MG/DOSE,) 4 MG/3ML SOPN injection pen, Inject 0 75 mL (1 mg total) under the skin once a week, Disp: 9 mL, Rfl: 3    Patient Active Problem List   Diagnosis    Type 2 diabetes mellitus without complication, without long-term current use of insulin ([facility])    Essential hypertension    Hyperlipidemia    Hypercalcemia          Patient ID: Lucy Chavez is a 55 y o  male  HPI    The following portions of the patient's history were reviewed and updated as appropriate:     family history includes Diabetes type II in his mother and sister; Hypertension in his father; Skin cancer in his father  reports that he has quit smoking  He has never used smokeless tobacco  He reports current alcohol use  He reports that he does not use drugs  Vitals:    02/02/22 1305   BP: 112/70   Resp: 17       Review of Systems      Objective:  Patient's shoes and socks removed     Foot Exam    General  General Appearance: appears stated age and healthy   Orientation: alert and oriented to person, place, and time   Affect: appropriate       Right Foot/Ankle     Inspection and Palpation  Tenderness: great toe interphalangeal joint   Swelling: dorsum   Arch: pes planus  Hallux valgus: yes    Neurovascular  Dorsalis pedis: 3+  Posterior tibial: 3+  Saphenous nerve sensation: normal  Tibial nerve sensation: normal  Superficial peroneal nerve sensation: normal  Deep peroneal nerve sensation: normal  Sural nerve sensation: normal      Left Foot/Ankle      Inspection and Palpation  Swelling: none   Arch: pes planus  Hallux valgus: yes    Neurovascular  Dorsalis pedis: 3+  Posterior tibial: 3+  Saphenous nerve sensation: normal  Tibial nerve sensation: normal  Superficial peroneal nerve sensation: normal  Deep peroneal nerve sensation: normal  Sural nerve sensation: normal        Physical Exam  Vitals and nursing note reviewed  Constitutional:       Appearance: Normal appearance  Cardiovascular:      Rate and Rhythm: Normal rate and regular rhythm  Pulses:           Dorsalis pedis pulses are 3+ on the right side and 3+ on the left side  Posterior tibial pulses are 3+ on the right side and 3+ on the left side  Musculoskeletal:      Right foot: Bunion present  Left foot: Bunion present  Skin:     Comments: Right hallux demonstrates paronychia and cellulitis fibular nail groove  There is broken off piece of nail noted in the groove  Removed  Negative occult pus  Neurological:      Mental Status: He is alert  Psychiatric:         Mood and Affect: Mood normal          Behavior: Behavior normal          Thought Content:  Thought content normal          Judgment: Judgment normal

## 2022-03-25 DIAGNOSIS — E78.2 MIXED HYPERLIPIDEMIA: ICD-10-CM

## 2022-03-25 DIAGNOSIS — E11.65 TYPE 2 DIABETES MELLITUS WITH HYPERGLYCEMIA, WITH LONG-TERM CURRENT USE OF INSULIN (HCC): ICD-10-CM

## 2022-03-25 DIAGNOSIS — I10 HYPERTENSION GOAL BP (BLOOD PRESSURE) < 140/90: ICD-10-CM

## 2022-03-25 DIAGNOSIS — Z79.4 TYPE 2 DIABETES MELLITUS WITH HYPERGLYCEMIA, WITH LONG-TERM CURRENT USE OF INSULIN (HCC): ICD-10-CM

## 2022-04-05 ENCOUNTER — APPOINTMENT (OUTPATIENT)
Dept: LAB | Facility: CLINIC | Age: 57
End: 2022-04-05
Payer: COMMERCIAL

## 2022-04-05 DIAGNOSIS — E78.2 MIXED HYPERLIPIDEMIA: ICD-10-CM

## 2022-04-05 DIAGNOSIS — E11.9 TYPE 2 DIABETES MELLITUS WITHOUT COMPLICATION, WITH LONG-TERM CURRENT USE OF INSULIN (HCC): ICD-10-CM

## 2022-04-05 DIAGNOSIS — Z79.4 TYPE 2 DIABETES MELLITUS WITHOUT COMPLICATION, WITH LONG-TERM CURRENT USE OF INSULIN (HCC): ICD-10-CM

## 2022-04-05 DIAGNOSIS — E83.52 HYPERCALCEMIA: ICD-10-CM

## 2022-04-05 DIAGNOSIS — I10 ESSENTIAL HYPERTENSION: ICD-10-CM

## 2022-04-05 LAB
25(OH)D3 SERPL-MCNC: 26.2 NG/ML (ref 30–100)
ALBUMIN SERPL BCP-MCNC: 4.1 G/DL (ref 3.5–5)
ALP SERPL-CCNC: 63 U/L (ref 46–116)
ALT SERPL W P-5'-P-CCNC: 47 U/L (ref 12–78)
ANION GAP SERPL CALCULATED.3IONS-SCNC: 7 MMOL/L (ref 4–13)
AST SERPL W P-5'-P-CCNC: 34 U/L (ref 5–45)
BILIRUB SERPL-MCNC: 1.49 MG/DL (ref 0.2–1)
BUN SERPL-MCNC: 14 MG/DL (ref 5–25)
CALCIUM SERPL-MCNC: 9.5 MG/DL (ref 8.3–10.1)
CHLORIDE SERPL-SCNC: 104 MMOL/L (ref 100–108)
CO2 SERPL-SCNC: 28 MMOL/L (ref 21–32)
CREAT SERPL-MCNC: 0.98 MG/DL (ref 0.6–1.3)
CREAT UR-MCNC: 138 MG/DL
EST. AVERAGE GLUCOSE BLD GHB EST-MCNC: 146 MG/DL
GFR SERPL CREATININE-BSD FRML MDRD: 86 ML/MIN/1.73SQ M
GLUCOSE P FAST SERPL-MCNC: 123 MG/DL (ref 65–99)
HBA1C MFR BLD: 6.7 %
MICROALBUMIN UR-MCNC: 29.2 MG/L (ref 0–20)
MICROALBUMIN/CREAT 24H UR: 21 MG/G CREATININE (ref 0–30)
POTASSIUM SERPL-SCNC: 3.7 MMOL/L (ref 3.5–5.3)
PROT SERPL-MCNC: 7.2 G/DL (ref 6.4–8.2)
PTH-INTACT SERPL-MCNC: 50.2 PG/ML (ref 18.4–80.1)
SODIUM SERPL-SCNC: 139 MMOL/L (ref 136–145)

## 2022-04-05 PROCEDURE — 83970 ASSAY OF PARATHORMONE: CPT

## 2022-04-05 PROCEDURE — 80053 COMPREHEN METABOLIC PANEL: CPT

## 2022-04-05 PROCEDURE — 36415 COLL VENOUS BLD VENIPUNCTURE: CPT

## 2022-04-05 PROCEDURE — 82043 UR ALBUMIN QUANTITATIVE: CPT

## 2022-04-05 PROCEDURE — 82570 ASSAY OF URINE CREATININE: CPT

## 2022-04-05 PROCEDURE — 82306 VITAMIN D 25 HYDROXY: CPT

## 2022-04-05 PROCEDURE — 83036 HEMOGLOBIN GLYCOSYLATED A1C: CPT

## 2022-04-08 ENCOUNTER — OFFICE VISIT (OUTPATIENT)
Dept: PODIATRY | Facility: CLINIC | Age: 57
End: 2022-04-08
Payer: COMMERCIAL

## 2022-04-08 VITALS
BODY MASS INDEX: 28.56 KG/M2 | RESPIRATION RATE: 16 BRPM | WEIGHT: 204 LBS | SYSTOLIC BLOOD PRESSURE: 112 MMHG | DIASTOLIC BLOOD PRESSURE: 70 MMHG | HEIGHT: 71 IN

## 2022-04-08 DIAGNOSIS — L60.0 INGROWN TOENAIL: ICD-10-CM

## 2022-04-08 DIAGNOSIS — M79.671 RIGHT FOOT PAIN: ICD-10-CM

## 2022-04-08 DIAGNOSIS — B35.1 ONYCHOMYCOSIS: ICD-10-CM

## 2022-04-08 DIAGNOSIS — E11.9 TYPE 2 DIABETES MELLITUS WITHOUT COMPLICATION, WITHOUT LONG-TERM CURRENT USE OF INSULIN (HCC): ICD-10-CM

## 2022-04-08 DIAGNOSIS — L03.031 PARONYCHIA OF TOENAIL OF RIGHT FOOT: Primary | ICD-10-CM

## 2022-04-08 PROCEDURE — 99213 OFFICE O/P EST LOW 20 MIN: CPT | Performed by: PODIATRIST

## 2022-04-08 RX ORDER — TERBINAFINE HYDROCHLORIDE 250 MG/1
250 TABLET ORAL DAILY
Qty: 20 TABLET | Refills: 0 | Status: SHIPPED | OUTPATIENT
Start: 2022-04-08 | End: 2022-04-28

## 2022-04-08 NOTE — PROGRESS NOTES
Assessment/Plan:  Chronic ingrown toenail right hallux  Paronychia right hallux  Pain  This is in clinical scenario of diabetic patient  Mycosis have right hallux nail  Plan  Nail debrided  Gentian violet applied  Patient be placed on oral terbinafine  Patient will schedule nail matrixectomy  He will watch for signs of infection  Diagnoses and all orders for this visit:    Paronychia of toenail of right foot    Type 2 diabetes mellitus without complication, without long-term current use of insulin (HCC)    Ingrown toenail    Onychomycosis  -     terbinafine (LamISIL) 250 mg tablet; Take 1 tablet (250 mg total) by mouth daily for 20 days    Right foot pain          Subjective:  Patient has continued pain in his right big toe  He he notices it is discolored  He presents for evaluation    Patient is diabetic    Allergies   Allergen Reactions    Bydureon [Exenatide]          Current Outpatient Medications:     Accu-Chek FastClix Lancets MISC, Use 2 lancets a day to check blood sugar, Disp: 204 each, Rfl: 3    ALPRAZolam (XANAX) 0 5 mg tablet, Take by mouth as needed for anxiety, Disp: , Rfl:     ALPRAZolam ER (XANAX XR) 0 5 MG 24 hr tablet, , Disp: , Rfl:     Ronald Microlet Lancets lancets, Use to test blood sugar twice a day, Disp: 200 each, Rfl: 3    cyclobenzaprine (FLEXERIL) 5 mg tablet, , Disp: , Rfl:     famotidine (PEPCID) 20 mg tablet, Take 20 mg by mouth as needed , Disp: , Rfl:     glucose 4 g chewable tablet, Chew 4 tablets (16 g total) as needed for low blood sugar, Disp: 100 tablet, Rfl: 1    glucose blood (Accu-Chek Guide) test strip, Use to test blood sugar twice a day, Disp: 200 each, Rfl: 2    glucose blood (Contour Next Test) test strip, Use to test blood sugar twice a aday, Disp: 200 each, Rfl: 3    insulin glargine (Lantus SoloStar) 100 units/mL injection pen, INJECT SUBCUTANEOUSLY 25  UNITS DAILY AT BEDTIME, Disp: 45 mL, Rfl: 3    Insulin Pen Needle (BD Pen Needle Melany U/F) 32G X 4 MM MISC, USE TO INJECT  SUBCUTANEOUSLY ONCE DAILY  TO ADMINISTER INSULIN, Disp: 90 each, Rfl: 1    ketoconazole (NIZORAL) 2 % cream, Apply topically daily, Disp: 60 g, Rfl: 1    lisinopril-hydrochlorothiazide (PRINZIDE,ZESTORETIC) 10-12 5 MG per tablet, Take 1 tablet by mouth daily, Disp: , Rfl:     loratadine (CLARITIN) 10 mg tablet, Take 10 mg by mouth daily, Disp: , Rfl:     metFORMIN (GLUCOPHAGE) 1000 MG tablet, Take 1 tablet (1,000 mg total) by mouth 2 (two) times a day with meals, Disp: 180 tablet, Rfl: 3    rosuvastatin (CRESTOR) 10 MG tablet, Take 10 mg by mouth daily, Disp: , Rfl:     semaglutide, 1 mg/dose, (Ozempic, 1 MG/DOSE,) 4 MG/3ML SOPN injection pen, Inject 0 75 mL (1 mg total) under the skin once a week, Disp: 9 mL, Rfl: 3    terbinafine (LamISIL) 250 mg tablet, Take 1 tablet (250 mg total) by mouth daily for 20 days, Disp: 20 tablet, Rfl: 0    Patient Active Problem List   Diagnosis    Type 2 diabetes mellitus without complication, without long-term current use of insulin ([facility])    Essential hypertension    Hyperlipidemia    Hypercalcemia          Patient ID: Hilda Morgan is a 55 y o  male  HPI    The following portions of the patient's history were reviewed and updated as appropriate:     family history includes Diabetes type II in his mother and sister; Hypertension in his father; Skin cancer in his father  reports that he has quit smoking  He has never used smokeless tobacco  He reports current alcohol use  He reports that he does not use drugs  Vitals:    04/08/22 1253   BP: 112/70   Resp: 16       Review of Systems      Objective:  Patient's shoes and socks removed     Foot ExamPhysical Exam      Foot Exam     General  General Appearance: appears stated age and healthy   Orientation: alert and oriented to person, place, and time   Affect: appropriate         Right Foot/Ankle      Inspection and Palpation  Tenderness: great toe interphalangeal joint Swelling: dorsum   Arch: pes planus  Hallux valgus: yes     Neurovascular  Dorsalis pedis: 3+  Posterior tibial: 3+  Saphenous nerve sensation: normal  Tibial nerve sensation: normal  Superficial peroneal nerve sensation: normal  Deep peroneal nerve sensation: normal  Sural nerve sensation: normal        Left Foot/Ankle       Inspection and Palpation  Swelling: none   Arch: pes planus  Hallux valgus: yes     Neurovascular  Dorsalis pedis: 3+  Posterior tibial: 3+  Saphenous nerve sensation: normal  Tibial nerve sensation: normal  Superficial peroneal nerve sensation: normal  Deep peroneal nerve sensation: normal  Sural nerve sensation: normal           Physical Exam  Vitals and nursing note reviewed  Constitutional:       Appearance: Normal appearance  Cardiovascular:      Rate and Rhythm: Normal rate and regular rhythm  Pulses:           Dorsalis pedis pulses are 3+ on the right side and 3+ on the left side  Posterior tibial pulses are 3+ on the right side and 3+ on the left side  Musculoskeletal:      Right foot: Bunion present  Left foot: Bunion present  Skin:     Comments: Right hallux demonstrates paronychia and cellulitis fibular nail groove  There is broken off piece of nail noted in the groove  Removed  Negative occult pus  Positive mycosis lateral aspect nail plate    Neurological:      Mental Status: He is alert  Psychiatric:         Mood and Affect: Mood normal          Behavior: Behavior normal          Thought Content:  Thought content normal          Judgment: Judgment normal

## 2022-04-11 ENCOUNTER — TELEPHONE (OUTPATIENT)
Dept: ENDOCRINOLOGY | Facility: CLINIC | Age: 57
End: 2022-04-11

## 2022-04-11 NOTE — TELEPHONE ENCOUNTER
----- Message from Lorenzo Galdamez MD sent at 4/11/2022  4:01 PM EDT -----  A1c 6 7 Repeat labs with normal calcium, normal PTHVitamin-D slightly low Recommend starting vitamin-D3 1000 International Units orally daily Keep well hydrated

## 2022-04-20 ENCOUNTER — PROCEDURE VISIT (OUTPATIENT)
Dept: PODIATRY | Facility: CLINIC | Age: 57
End: 2022-04-20
Payer: COMMERCIAL

## 2022-04-20 VITALS
HEIGHT: 71 IN | SYSTOLIC BLOOD PRESSURE: 131 MMHG | WEIGHT: 204 LBS | BODY MASS INDEX: 28.56 KG/M2 | DIASTOLIC BLOOD PRESSURE: 84 MMHG | RESPIRATION RATE: 17 BRPM

## 2022-04-20 DIAGNOSIS — L03.031 PARONYCHIA OF TOENAIL OF RIGHT FOOT: ICD-10-CM

## 2022-04-20 DIAGNOSIS — L60.0 INGROWN TOENAIL: Primary | ICD-10-CM

## 2022-04-20 PROCEDURE — 11750 EXCISION NAIL&NAIL MATRIX: CPT | Performed by: PODIATRIST

## 2022-04-20 RX ORDER — AZITHROMYCIN 250 MG/1
TABLET, FILM COATED ORAL
Qty: 6 TABLET | Refills: 0 | Status: SHIPPED | OUTPATIENT
Start: 2022-04-20 | End: 2022-04-24

## 2022-04-20 NOTE — PROGRESS NOTES
Foot Exam     General  General Appearance: appears stated age and healthy   Orientation: alert and oriented to person, place, and time   Affect: appropriate         Right Foot/Ankle      Inspection and Palpation  Tenderness: great toe interphalangeal joint   Swelling: dorsum   Arch: pes planus  Hallux valgus: yes     Neurovascular  Dorsalis pedis: 3+  Posterior tibial: 3+  Saphenous nerve sensation: normal  Tibial nerve sensation: normal  Superficial peroneal nerve sensation: normal  Deep peroneal nerve sensation: normal  Sural nerve sensation: normal        Left Foot/Ankle       Inspection and Palpation  Swelling: none   Arch: pes planus  Hallux valgus: yes     Neurovascular  Dorsalis pedis: 3+  Posterior tibial: 3+  Saphenous nerve sensation: normal  Tibial nerve sensation: normal  Superficial peroneal nerve sensation: normal  Deep peroneal nerve sensation: normal  Sural nerve sensation: normal           Physical Exam  Vitals and nursing note reviewed  Constitutional:       Appearance: Normal appearance  Cardiovascular:      Rate and Rhythm: Normal rate and regular rhythm       Pulses:           Dorsalis pedis pulses are 3+ on the right side and 3+ on the left side         Posterior tibial pulses are 3+ on the right side and 3+ on the left side  Musculoskeletal:      Right foot: Bunion present       Left foot: Bunion present  Skin:     Comments: Right hallux demonstrates paronychia and cellulitis fibular nail groove   There is broken off piece of nail noted in the groove   Removed   Negative occult pus  Positive mycosis lateral aspect nail plate    Neurological:      Mental Status: He is alert  Psychiatric:         Mood and Affect: Mood normal          BehaviorTena Saucer         Thought Content:  Thought content normal          Judgment: Judgment normal            Nail removal    Date/Time: 4/20/2022 3:14 PM  Performed by: Lizbeth Cardenas DPM  Authorized by: Lizbeth Cardenas DPM     [facility]:  Consent: Verbal consent obtained  Written consent obtained  Risks and benefits: risks, benefits and alternatives were discussed  Consent given by: patient  Time out: Immediately prior to procedure a "time out" was called to verify the correct patient, procedure, equipment, support staff and site/side marked as required  Timeout called at: 4/20/2022 3:14 PM   Patient understanding: patient states understanding of the procedure being performed  Patient identity confirmed: verbally with patient      Location:     Foot:  R big toe  Pre-procedure details:     Skin preparation:  Betadine    Preparation: Patient was prepped and draped in the usual sterile fashion    Anesthesia (see MAR for exact dosages): Anesthesia method: Hallux nerve block achieved with 6 cc 2% lidocaine plain  Nail Removal:     Nail removed:  Partial    Nail side:  Lateral    Nail bed sutured: no      Removed nail replaced and anchored: no    Trephination:     Subungual hematoma drained: no    Ingrown nail:     Wedge excision of skin: no      Nail matrix amount removed: Nail matrixectomy performed via 3, 30 seconds application of phenol  Post-procedure details:     Dressing:  4x4 sterile gauze, antibiotic ointment and gauze roll    Patient tolerance of procedure: Tolerated well, no immediate complications  Comments:      Consent form signed  Written instructions given    Patient be placed on Zithromax       Foot Exam

## 2022-04-29 DIAGNOSIS — I10 HYPERTENSION GOAL BP (BLOOD PRESSURE) < 140/90: ICD-10-CM

## 2022-04-29 DIAGNOSIS — E78.2 MIXED HYPERLIPIDEMIA: ICD-10-CM

## 2022-04-29 DIAGNOSIS — E11.65 TYPE 2 DIABETES MELLITUS WITH HYPERGLYCEMIA, WITHOUT LONG-TERM CURRENT USE OF INSULIN (HCC): ICD-10-CM

## 2022-05-02 RX ORDER — PEN NEEDLE, DIABETIC 32GX 5/32"
NEEDLE, DISPOSABLE MISCELLANEOUS
Qty: 90 EACH | Refills: 1 | Status: SHIPPED | OUTPATIENT
Start: 2022-05-02

## 2022-05-06 ENCOUNTER — OFFICE VISIT (OUTPATIENT)
Dept: PODIATRY | Facility: CLINIC | Age: 57
End: 2022-05-06
Payer: COMMERCIAL

## 2022-05-06 VITALS
RESPIRATION RATE: 17 BRPM | HEIGHT: 71 IN | SYSTOLIC BLOOD PRESSURE: 131 MMHG | DIASTOLIC BLOOD PRESSURE: 84 MMHG | WEIGHT: 204 LBS | BODY MASS INDEX: 28.56 KG/M2

## 2022-05-06 DIAGNOSIS — S91.301D OPEN WOUND OF RIGHT FOOT, SUBSEQUENT ENCOUNTER: Primary | ICD-10-CM

## 2022-05-06 DIAGNOSIS — E11.9 TYPE 2 DIABETES MELLITUS WITHOUT COMPLICATION, WITHOUT LONG-TERM CURRENT USE OF INSULIN (HCC): ICD-10-CM

## 2022-05-06 PROCEDURE — 99212 OFFICE O/P EST SF 10 MIN: CPT | Performed by: PODIATRIST

## 2022-05-06 NOTE — PROGRESS NOTES
Assessment/Plan:  Open wound nail bed right hallux  Patient with diabetes  Plan  Foot exam   Patient advised on condition  Wound debrided  Gentian violet dry sterile dressing applied  Patient will bandage daily for several days  Return p r n  Diagnoses and all orders for this visit:    Open wound of right foot, subsequent encounter    Type 2 diabetes mellitus without complication, without long-term current use of insulin ([facility])          Subjective:  Patient is doing well  He has minimal discomfort in the toe    Patient is diabetic    Allergies   Allergen Reactions    Bydureon [Exenatide]          Current Outpatient Medications:     Accu-Chek FastClix Lancets MISC, Use 2 lancets a day to check blood sugar, Disp: 204 each, Rfl: 3    ALPRAZolam (XANAX) 0 5 mg tablet, Take by mouth as needed for anxiety, Disp: , Rfl:     ALPRAZolam ER (XANAX XR) 0 5 MG 24 hr tablet, , Disp: , Rfl:     Ronald Microlet Lancets lancets, Use to test blood sugar twice a day, Disp: 200 each, Rfl: 3    cyclobenzaprine (FLEXERIL) 5 mg tablet, , Disp: , Rfl:     famotidine (PEPCID) 20 mg tablet, Take 20 mg by mouth as needed , Disp: , Rfl:     glucose 4 g chewable tablet, Chew 4 tablets (16 g total) as needed for low blood sugar, Disp: 100 tablet, Rfl: 1    glucose blood (Accu-Chek Guide) test strip, Use to test blood sugar twice a day, Disp: 200 each, Rfl: 2    glucose blood (Contour Next Test) test strip, Use to test blood sugar twice a aday, Disp: 200 each, Rfl: 3    insulin glargine (Lantus SoloStar) 100 units/mL injection pen, INJECT SUBCUTANEOUSLY 25  UNITS DAILY AT BEDTIME, Disp: 45 mL, Rfl: 3    Insulin Pen Needle (BD Pen Needle Melany U/F) 32G X 4 MM MISC, USE TO ADMINISTER INSULIN  ONCE DAILY, Disp: 90 each, Rfl: 1    ketoconazole (NIZORAL) 2 % cream, Apply topically daily, Disp: 60 g, Rfl: 1    lisinopril-hydrochlorothiazide (PRINZIDE,ZESTORETIC) 10-12 5 MG per tablet, Take 1 tablet by mouth daily, Disp: , Rfl:     loratadine (CLARITIN) 10 mg tablet, Take 10 mg by mouth daily, Disp: , Rfl:     metFORMIN (GLUCOPHAGE) 1000 MG tablet, Take 1 tablet (1,000 mg total) by mouth 2 (two) times a day with meals, Disp: 180 tablet, Rfl: 3    rosuvastatin (CRESTOR) 10 MG tablet, Take 10 mg by mouth daily, Disp: , Rfl:     semaglutide, 1 mg/dose, (Ozempic, 1 MG/DOSE,) 4 MG/3ML SOPN injection pen, Inject 0 75 mL (1 mg total) under the skin once a week, Disp: 9 mL, Rfl: 3    Patient Active Problem List   Diagnosis    Type 2 diabetes mellitus without complication, without long-term current use of insulin (HCC)    Essential hypertension    Hyperlipidemia    Hypercalcemia          Patient ID: Estefani Klein is a 55 y o  male  HPI    The following portions of the patient's history were reviewed and updated as appropriate:     family history includes Diabetes type II in his mother and sister; Hypertension in his father; Skin cancer in his father  reports that he has quit smoking  He has never used smokeless tobacco  He reports current alcohol use  He reports that he does not use drugs  Vitals:    05/06/22 1258   BP: 131/84   Resp: 17       Review of Systems      Objective:  Patient's shoes and socks removed  Foot ExamPhysical Exam  Vitals and nursing note reviewed  Constitutional:       Appearance: Normal appearance  Cardiovascular:      Rate and Rhythm: Normal rate and regular rhythm  Skin:     Capillary Refill: Capillary refill takes less than 2 seconds  Comments: Right hallux demonstrates ulcerated fibular nail groove/bed  Negative cellulitis  Neurological:      Mental Status: He is alert  Psychiatric:         Mood and Affect: Mood normal          Behavior: Behavior normal          Thought Content:  Thought content normal          Judgment: Judgment normal

## 2022-09-01 DIAGNOSIS — Z79.4 TYPE 2 DIABETES MELLITUS WITH HYPERGLYCEMIA, WITH LONG-TERM CURRENT USE OF INSULIN (HCC): ICD-10-CM

## 2022-09-01 DIAGNOSIS — I10 HYPERTENSION GOAL BP (BLOOD PRESSURE) < 140/90: ICD-10-CM

## 2022-09-01 DIAGNOSIS — E11.65 TYPE 2 DIABETES MELLITUS WITH HYPERGLYCEMIA, WITH LONG-TERM CURRENT USE OF INSULIN (HCC): ICD-10-CM

## 2022-09-01 DIAGNOSIS — E78.2 MIXED HYPERLIPIDEMIA: ICD-10-CM

## 2022-09-21 ENCOUNTER — TELEPHONE (OUTPATIENT)
Dept: ENDOCRINOLOGY | Facility: CLINIC | Age: 57
End: 2022-09-21

## 2022-09-21 DIAGNOSIS — E78.2 MIXED HYPERLIPIDEMIA: ICD-10-CM

## 2022-09-21 DIAGNOSIS — Z79.4 TYPE 2 DIABETES MELLITUS WITHOUT COMPLICATION, WITH LONG-TERM CURRENT USE OF INSULIN (HCC): ICD-10-CM

## 2022-09-21 DIAGNOSIS — E11.65 TYPE 2 DIABETES MELLITUS WITH HYPERGLYCEMIA, WITHOUT LONG-TERM CURRENT USE OF INSULIN (HCC): ICD-10-CM

## 2022-09-21 DIAGNOSIS — I10 HYPERTENSION GOAL BP (BLOOD PRESSURE) < 140/90: ICD-10-CM

## 2022-09-21 DIAGNOSIS — E11.9 TYPE 2 DIABETES MELLITUS WITHOUT COMPLICATION, WITH LONG-TERM CURRENT USE OF INSULIN (HCC): ICD-10-CM

## 2022-09-22 RX ORDER — SEMAGLUTIDE 1.34 MG/ML
INJECTION, SOLUTION SUBCUTANEOUS
Qty: 9 ML | Refills: 0 | OUTPATIENT
Start: 2022-09-22

## 2022-09-22 RX ORDER — PEN NEEDLE, DIABETIC 32GX 5/32"
NEEDLE, DISPOSABLE MISCELLANEOUS
Qty: 90 EACH | Refills: 1 | OUTPATIENT
Start: 2022-09-22

## 2022-09-22 RX ORDER — PEN NEEDLE, DIABETIC 32GX 5/32"
NEEDLE, DISPOSABLE MISCELLANEOUS
Qty: 90 EACH | Refills: 1 | Status: SHIPPED | OUTPATIENT
Start: 2022-09-22

## 2022-09-22 RX ORDER — SEMAGLUTIDE 1.34 MG/ML
1 INJECTION, SOLUTION SUBCUTANEOUS WEEKLY
Qty: 9 ML | Refills: 3 | Status: SHIPPED | OUTPATIENT
Start: 2022-09-22

## 2022-11-04 ENCOUNTER — OFFICE VISIT (OUTPATIENT)
Dept: PODIATRY | Facility: CLINIC | Age: 57
End: 2022-11-04

## 2022-11-04 VITALS
RESPIRATION RATE: 17 BRPM | HEIGHT: 71 IN | DIASTOLIC BLOOD PRESSURE: 84 MMHG | BODY MASS INDEX: 28.56 KG/M2 | WEIGHT: 204 LBS | SYSTOLIC BLOOD PRESSURE: 131 MMHG

## 2022-11-04 DIAGNOSIS — M79.671 RIGHT FOOT PAIN: ICD-10-CM

## 2022-11-04 DIAGNOSIS — E11.9 TYPE 2 DIABETES MELLITUS WITHOUT COMPLICATION, WITHOUT LONG-TERM CURRENT USE OF INSULIN (HCC): Primary | ICD-10-CM

## 2022-11-04 DIAGNOSIS — S91.301D OPEN WOUND OF RIGHT FOOT, SUBSEQUENT ENCOUNTER: ICD-10-CM

## 2022-11-04 NOTE — PROGRESS NOTES
Assessment/Plan:  Right hallux demonstrates a healed wound  No evidence of ingrown toenail or paronychia  Patient mild diabetic neuropathy  Plan  Right hallux nail debrided  Patient advised on aftercare  Patient advised on care of the diabetic foot  Diagnoses and all orders for this visit:    Type 2 diabetes mellitus without complication, without long-term current use of insulin ([facility])    Right foot pain    Open wound of right foot, subsequent encounter          Subjective:  Patient presents for follow-up    Patient presents for evaluation of right big toe wound/nail    Allergies   Allergen Reactions   • Bydureon [Exenatide]          Current Outpatient Medications:   •  Accu-Chek FastClix Lancets MISC, Use 2 lancets a day to check blood sugar, Disp: 204 each, Rfl: 3  •  ALPRAZolam (XANAX) 0 5 mg tablet, Take by mouth as needed for anxiety, Disp: , Rfl:   •  ALPRAZolam ER (XANAX XR) 0 5 MG 24 hr tablet, , Disp: , Rfl:   •  Ronald Microlet Lancets lancets, Use to test blood sugar twice a day, Disp: 200 each, Rfl: 3  •  cyclobenzaprine (FLEXERIL) 5 mg tablet, , Disp: , Rfl:   •  famotidine (PEPCID) 20 mg tablet, Take 20 mg by mouth as needed , Disp: , Rfl:   •  glucose 4 g chewable tablet, Chew 4 tablets (16 g total) as needed for low blood sugar, Disp: 100 tablet, Rfl: 1  •  glucose blood (Accu-Chek Guide) test strip, Use to test blood sugar twice a day, Disp: 200 each, Rfl: 2  •  glucose blood (Contour Next Test) test strip, Use to test blood sugar twice a aday, Disp: 200 each, Rfl: 3  •  insulin glargine (Lantus SoloStar) 100 units/mL injection pen, INJECT SUBCUTANEOUSLY 25  UNITS DAILY AT BEDTIME, Disp: 45 mL, Rfl: 3  •  Insulin Pen Needle (BD Pen Needle Melany U/F) 32G X 4 MM MISC, USE TO ADMINISTER INSULIN  ONCE DAILY, Disp: 90 each, Rfl: 1  •  ketoconazole (NIZORAL) 2 % cream, Apply topically daily, Disp: 60 g, Rfl: 1  •  lisinopril-hydrochlorothiazide (PRINZIDE,ZESTORETIC) 10-12 5 MG per tablet, Take 1 tablet by mouth daily, Disp: , Rfl:   •  loratadine (CLARITIN) 10 mg tablet, Take 10 mg by mouth daily, Disp: , Rfl:   •  metFORMIN (GLUCOPHAGE) 1000 MG tablet, TAKE 1 TABLET BY MOUTH  TWICE DAILY WITH MEALS, Disp: 180 tablet, Rfl: 3  •  rosuvastatin (CRESTOR) 10 MG tablet, Take 10 mg by mouth daily, Disp: , Rfl:   •  semaglutide, 1 mg/dose, (Ozempic, 1 MG/DOSE,) 4 MG/3ML SOPN injection pen, Inject 0 75 mL (1 mg total) under the skin once a week, Disp: 9 mL, Rfl: 3    Patient Active Problem List   Diagnosis   • Type 2 diabetes mellitus without complication, without long-term current use of insulin (HCC)   • Essential hypertension   • Hyperlipidemia   • Hypercalcemia          Patient ID: Elizabeth Loving is a 55 y o  male  HPI    The following portions of the patient's history were reviewed and updated as appropriate:     family history includes Diabetes type II in his mother and sister; Hypertension in his father; Skin cancer in his father  reports that he has quit smoking  He has never used smokeless tobacco  He reports current alcohol use  He reports that he does not use drugs  Vitals:    11/04/22 1255   BP: 131/84   Resp: 17       Review of Systems      Objective:  Patient's shoes and socks removed  Right Foot/Ankle   Right Foot Inspection      Sensory   Vibration: diminished      Vascular  Capillary refills: < 3 seconds          Left Foot/Ankle  Left Foot Inspection      Sensory   Vibration: diminished      Vascular  Capillary refills: < 3 seconds        Assign Risk Category  No deformity present  Loss of protective sensation  No weak pulses  Risk: 1       Foot ExamPhysical Exam  Vitals and nursing note reviewed  Constitutional:       Appearance: Normal appearance  Cardiovascular:      Rate and Rhythm: Normal rate and regular rhythm  Pulses: no weak pulses  Skin:     Capillary Refill: Capillary refill takes less than 2 seconds        Comments: Right hallux demonstrates healed wound of the fibular nail groove  Negative infection   Neurological:      Mental Status: He is alert  Psychiatric:         Mood and Affect: Mood normal          Thought Content:  Thought content normal

## 2023-01-13 DIAGNOSIS — E11.65 TYPE 2 DIABETES MELLITUS WITH HYPERGLYCEMIA, WITH LONG-TERM CURRENT USE OF INSULIN (HCC): ICD-10-CM

## 2023-01-13 DIAGNOSIS — Z79.4 TYPE 2 DIABETES MELLITUS WITH HYPERGLYCEMIA, WITH LONG-TERM CURRENT USE OF INSULIN (HCC): ICD-10-CM

## 2023-01-17 RX ORDER — INSULIN GLARGINE 100 [IU]/ML
INJECTION, SOLUTION SUBCUTANEOUS
Qty: 30 ML | Refills: 3 | Status: SHIPPED | OUTPATIENT
Start: 2023-01-17

## 2023-02-16 ENCOUNTER — OFFICE VISIT (OUTPATIENT)
Dept: ENDOCRINOLOGY | Facility: CLINIC | Age: 58
End: 2023-02-16

## 2023-02-16 VITALS
HEIGHT: 71 IN | HEART RATE: 87 BPM | WEIGHT: 200 LBS | DIASTOLIC BLOOD PRESSURE: 80 MMHG | BODY MASS INDEX: 28 KG/M2 | SYSTOLIC BLOOD PRESSURE: 130 MMHG

## 2023-02-16 DIAGNOSIS — E11.65 TYPE 2 DIABETES MELLITUS WITH HYPERGLYCEMIA, WITH LONG-TERM CURRENT USE OF INSULIN (HCC): Primary | ICD-10-CM

## 2023-02-16 DIAGNOSIS — Z79.4 TYPE 2 DIABETES MELLITUS WITH HYPERGLYCEMIA, WITH LONG-TERM CURRENT USE OF INSULIN (HCC): Primary | ICD-10-CM

## 2023-02-16 DIAGNOSIS — I10 ESSENTIAL HYPERTENSION: ICD-10-CM

## 2023-02-16 DIAGNOSIS — E83.52 HYPERCALCEMIA: ICD-10-CM

## 2023-02-16 DIAGNOSIS — I10 PRIMARY HYPERTENSION: ICD-10-CM

## 2023-02-16 DIAGNOSIS — E78.2 MIXED HYPERLIPIDEMIA: ICD-10-CM

## 2023-02-16 LAB — SL AMB POCT HEMOGLOBIN AIC: 8 (ref ?–6.5)

## 2023-02-16 PROCEDURE — 83036 HEMOGLOBIN GLYCOSYLATED A1C: CPT

## 2023-02-16 RX ORDER — INSULIN GLARGINE 100 [IU]/ML
INJECTION, SOLUTION SUBCUTANEOUS
Qty: 30 ML | Refills: 3
Start: 2023-02-16

## 2023-02-16 NOTE — PROGRESS NOTES
Job Hernandez 65 y o  male MRN: [id number]    Encounter: [id number]      Assessment/Plan     Assessment: This is a 65y o -year-old male with diabetes with hyperglycemia  Plan:    Diagnoses and all orders for this visit:    Type 2 diabetes mellitus with hyperglycemia, with long-term current use of insulin ([address] )  Lab Results   Component Value Date    HGBA1C 8 0 (A) 02/16/2023   A1c, uncontrolled  As per patient average blood sugars at home are in 140 mg per DL range  He did not bring glucose log, and does not check blood sugars on regular basis  We will increase Lantus to 27 units from 25 units, discussed to check blood sugar twice daily before breakfast and before dinner and send log in 1 month to make further adjustment  Continue Ozempic 1 mg once a week, no side effects  Continue metformin 1000 mg daily, he prefers not to increase the dose of metformin as it gives him diarrhea  Educated about lifestyle modifications, keeping carbohydrate consistent with meals, as he says routine as tolerated  Follow-up in 4 months    -     Insulin Glargine Solostar (Lantus SoloStar) 100 UNIT/ML SOPN; Inject 27 units at night  -     metFORMIN (GLUCOPHAGE) 1000 MG tablet; One tablet by mouth daily  -     Basic metabolic panel; Future  -     Microalbumin / creatinine urine ratio; Future  -     Hemoglobin A1C; Future  -     Basic metabolic panel; Future  -     Microalbumin / creatinine urine ratio; Future    Essential hypertension  Blood pressure goal is less than 130/80  Continue current management as per PCP  Mixed hyperlipidemia  LDL is at goal  Continue statins  Obtain lipid profile now as last blood work was in 2021  Repeated about dietary modifications, cutting down on fatty food  Lab Results   Component Value Date    LDLCALC 64 12/21/2021     -     metFORMIN (GLUCOPHAGE) 1000 MG tablet;  One tablet by mouth daily    Hypercalcemia  Calcium in April 2022 is normal  PTH is normal  Does not appear to be from primary hyperparathyroidism  Continue to monitor  Primary hypertension  Blood pressure well controlled  -     metFORMIN (GLUCOPHAGE) 1000 MG tablet; One tablet by mouth daily        CC: Diabetes    History of Present Illness     HPI:    Rolando Wade is 69-year-old gentleman with medical history of type 2 diabetes, fatty liver, hypertension, hyperlipidemia is here for follow-up  Deviously patient was seen by Dr Horton Force  Diabetes course has been stable, denies recent hospitalization for hyperglycemia or hypoglycemia  Current regimen is Lantus 25  units at bedtime,, Ozempic 0 5 mg once a week, metformin 1 g twice a day,  Patient denies any side effects  He checks blood sugars 4 times daily  For hyperlipidemia he takes Crestor, for hypertension he takes lisinopril, admits compliance denies side effects  He follows with podiatry and ophthalmology regularly  He also was noticed to have hypercalcemia, denies history of kidney stones, fracture bones  Most recent calcium is normal 9 5, PTH is also within normal range 50 2    Lab Results   Component Value Date    CALCIUM 9 5 04/05/2022     Lab Results   Component Value Date    PTH 50 2 04/05/2022    CALCIUM 9 5 04/05/2022     Lab Results   Component Value Date    HGBA1C 8 0 (A) 02/16/2023     Lab Results   Component Value Date    CREATININE 0 98 04/05/2022           Review of Systems   Constitutional: Negative for activity change, diaphoresis, fatigue, fever and unexpected weight change  HENT: Negative  Eyes: Negative for visual disturbance  Respiratory: Negative for cough, chest tightness and shortness of breath  Cardiovascular: Negative for chest pain, palpitations and leg swelling  Gastrointestinal: Negative for abdominal pain, blood in stool, constipation, diarrhea, nausea and vomiting  Endocrine: Negative for cold intolerance, heat intolerance, polydipsia, polyphagia and polyuria     Genitourinary: Negative for dysuria, enuresis, frequency and urgency  Musculoskeletal: Negative for arthralgias and myalgias  Skin: Negative for pallor, rash and wound  Allergic/Immunologic: Negative  Neurological: Negative for dizziness, tremors, weakness and numbness  Hematological: Negative  Psychiatric/Behavioral: Negative          Historical Information   Past Medical History:   Diagnosis Date   • Annual physical exam 12/29/2020   • Diabetes mellitus ([facility] [address] )    • Diabetes type 2, uncontrolled    • Fatty liver    • Hyperlipidemia    • Hypertension      Past Surgical History:   Procedure Laterality Date   • APPENDECTOMY       Social History   Social History     Substance and Sexual Activity   Alcohol Use Yes    Comment: socially     Social History     Substance and Sexual Activity   Drug Use Never     Social History     Tobacco Use   Smoking Status Former   Smokeless Tobacco Never     Family History:   Family History   Problem Relation Age of Onset   • Diabetes type II Mother    • Hypertension Father    • Skin cancer Father    • Diabetes type II Sister        Meds/Allergies   Current Outpatient Medications   Medication Sig Dispense Refill   • Ronald Microlet Lancets lancets Use to test blood sugar twice a day 200 each 3   • famotidine (PEPCID) 20 mg tablet Take 20 mg by mouth as needed      • glucose 4 g chewable tablet Chew 4 tablets (16 g total) as needed for low blood sugar 100 tablet 1   • glucose blood (Contour Next Test) test strip Use to test blood sugar twice a aday 200 each 3   • Insulin Glargine Solostar (Lantus SoloStar) 100 UNIT/ML SOPN Inject 27 units at night 30 mL 3   • Insulin Pen Needle (BD Pen Needle Melany U/F) 32G X 4 MM MISC USE TO ADMINISTER INSULIN  ONCE DAILY 90 each 1   • lisinopril-hydrochlorothiazide (PRINZIDE,ZESTORETIC) 10-12 5 MG per tablet Take 1 tablet by mouth daily     • loratadine (CLARITIN) 10 mg tablet Take 10 mg by mouth daily     • metFORMIN (GLUCOPHAGE) 1000 MG tablet One tablet by mouth daily 90 tablet 1   • rosuvastatin (CRESTOR) 10 MG tablet Take 10 mg by mouth daily     • semaglutide, 1 mg/dose, (Ozempic, 1 MG/DOSE,) 4 MG/3ML SOPN injection pen Inject 0 75 mL (1 mg total) under the skin once a week 9 mL 3   • Accu-Chek FastClix Lancets MISC Use 2 lancets a day to check blood sugar 204 each 3   • ALPRAZolam (XANAX) 0 5 mg tablet Take by mouth as needed for anxiety     • ALPRAZolam ER (XANAX XR) 0 5 MG 24 hr tablet  (Patient not taking: Reported on 7/20/2021)     • cyclobenzaprine (FLEXERIL) 5 mg tablet  (Patient not taking: Reported on 7/20/2021)     • glucose blood (Accu-Chek Guide) test strip Use to test blood sugar twice a day 200 each 2   • ketoconazole (NIZORAL) 2 % cream Apply topically daily 60 g 1     No current facility-administered medications for this visit  Allergies   Allergen Reactions   • Bydureon [Exenatide]        Objective   Vitals: Blood pressure 130/80, pulse 87, height 5' 11" (1 803 m), weight 90 7 kg (200 lb)  Physical Exam  Vitals reviewed  Constitutional:       General: He is not in acute distress  Appearance: He is well-developed  HENT:      Head: Normocephalic and atraumatic  Eyes:      Pupils: Pupils are equal, round, and reactive to light  Neck:      Thyroid: No thyromegaly  Cardiovascular:      Rate and Rhythm: Normal rate and regular rhythm  Heart sounds: Normal heart sounds  Pulmonary:      Effort: Pulmonary effort is normal  No respiratory distress  Breath sounds: Normal breath sounds  Musculoskeletal:         General: No deformity  Normal range of motion  Cervical back: Normal range of motion and neck supple  Skin:     General: Skin is warm and dry  Findings: No erythema  Neurological:      Mental Status: He is alert and oriented to person, place, and time  The history was obtained from the review of the chart, patient      Lab Results:   Lab Results   Component Value Date/Time    Hemoglobin A1C 8 0 (A) 02/16/2023 10:08 AM    Hemoglobin A1C 6 7 (H) 04/05/2022 10:28 AM    BUN 14 04/05/2022 10:28 AM    Potassium 3 7 04/05/2022 10:28 AM    Chloride 104 04/05/2022 10:28 AM    CO2 28 04/05/2022 10:28 AM    Creatinine 0 98 04/05/2022 10:28 AM    AST 34 04/05/2022 10:28 AM    ALT 47 04/05/2022 10:28 AM    Albumin 4 1 04/05/2022 10:28 AM           Imaging Studies: I have personally reviewed pertinent reports  Portions of the record may have been created with voice recognition software  Occasional wrong word or "sound a like" substitutions may have occurred due to the inherent limitations of voice recognition software  Read the chart carefully and recognize, using context, where substitutions have occurred

## 2023-03-08 DIAGNOSIS — I10 HYPERTENSION GOAL BP (BLOOD PRESSURE) < 140/90: ICD-10-CM

## 2023-03-08 DIAGNOSIS — E78.2 MIXED HYPERLIPIDEMIA: ICD-10-CM

## 2023-03-08 DIAGNOSIS — E11.65 TYPE 2 DIABETES MELLITUS WITH HYPERGLYCEMIA, WITHOUT LONG-TERM CURRENT USE OF INSULIN (HCC): ICD-10-CM

## 2023-03-08 RX ORDER — PEN NEEDLE, DIABETIC 32GX 5/32"
NEEDLE, DISPOSABLE MISCELLANEOUS
Qty: 90 EACH | Refills: 1 | Status: SHIPPED | OUTPATIENT
Start: 2023-03-08

## 2023-03-28 ENCOUNTER — APPOINTMENT (OUTPATIENT)
Dept: LAB | Facility: CLINIC | Age: 58
End: 2023-03-28

## 2023-03-28 ENCOUNTER — TRANSCRIBE ORDERS (OUTPATIENT)
Dept: LAB | Facility: CLINIC | Age: 58
End: 2023-03-28

## 2023-03-28 DIAGNOSIS — E78.2 MIXED HYPERLIPIDEMIA: ICD-10-CM

## 2023-03-28 DIAGNOSIS — Z00.01 ENCOUNTER FOR GENERAL ADULT MEDICAL EXAMINATION WITH ABNORMAL FINDINGS: ICD-10-CM

## 2023-03-28 DIAGNOSIS — I10 ESSENTIAL HYPERTENSION, BENIGN: ICD-10-CM

## 2023-03-28 DIAGNOSIS — E11.40 DIABETIC NEUROPATHY WITH NEUROLOGIC COMPLICATION (HCC): Primary | ICD-10-CM

## 2023-03-28 DIAGNOSIS — E11.49 DIABETIC NEUROPATHY WITH NEUROLOGIC COMPLICATION (HCC): Primary | ICD-10-CM

## 2023-03-28 DIAGNOSIS — E78.5 HYPERLIPIDEMIA, UNSPECIFIED HYPERLIPIDEMIA TYPE: ICD-10-CM

## 2023-03-28 DIAGNOSIS — Z79.4 TYPE 2 DIABETES MELLITUS WITH HYPERGLYCEMIA, WITH LONG-TERM CURRENT USE OF INSULIN (HCC): ICD-10-CM

## 2023-03-28 DIAGNOSIS — E11.65 TYPE 2 DIABETES MELLITUS WITH HYPERGLYCEMIA, WITH LONG-TERM CURRENT USE OF INSULIN (HCC): ICD-10-CM

## 2023-03-28 LAB
BASOPHILS # BLD AUTO: 0.05 THOUSANDS/ÂΜL (ref 0–0.1)
BASOPHILS NFR BLD AUTO: 1 % (ref 0–1)
CREAT UR-MCNC: 191 MG/DL
EOSINOPHIL # BLD AUTO: 0.08 THOUSAND/ÂΜL (ref 0–0.61)
EOSINOPHIL NFR BLD AUTO: 1 % (ref 0–6)
ERYTHROCYTE [DISTWIDTH] IN BLOOD BY AUTOMATED COUNT: 12.4 % (ref 11.6–15.1)
HCT VFR BLD AUTO: 44.9 % (ref 36.5–49.3)
HGB BLD-MCNC: 15.4 G/DL (ref 12–17)
IMM GRANULOCYTES # BLD AUTO: 0.02 THOUSAND/UL (ref 0–0.2)
IMM GRANULOCYTES NFR BLD AUTO: 0 % (ref 0–2)
LYMPHOCYTES # BLD AUTO: 1.13 THOUSANDS/ÂΜL (ref 0.6–4.47)
LYMPHOCYTES NFR BLD AUTO: 20 % (ref 14–44)
MCH RBC QN AUTO: 31.3 PG (ref 26.8–34.3)
MCHC RBC AUTO-ENTMCNC: 34.3 G/DL (ref 31.4–37.4)
MCV RBC AUTO: 91 FL (ref 82–98)
MICROALBUMIN UR-MCNC: 61.4 MG/L (ref 0–20)
MICROALBUMIN/CREAT 24H UR: 32 MG/G CREATININE (ref 0–30)
MONOCYTES # BLD AUTO: 0.64 THOUSAND/ÂΜL (ref 0.17–1.22)
MONOCYTES NFR BLD AUTO: 11 % (ref 4–12)
NEUTROPHILS # BLD AUTO: 3.72 THOUSANDS/ÂΜL (ref 1.85–7.62)
NEUTS SEG NFR BLD AUTO: 67 % (ref 43–75)
NRBC BLD AUTO-RTO: 0 /100 WBCS
PLATELET # BLD AUTO: 223 THOUSANDS/UL (ref 149–390)
PMV BLD AUTO: 10.9 FL (ref 8.9–12.7)
PSA SERPL-MCNC: 2 NG/ML (ref 0–4)
RBC # BLD AUTO: 4.92 MILLION/UL (ref 3.88–5.62)
WBC # BLD AUTO: 5.64 THOUSAND/UL (ref 4.31–10.16)

## 2023-03-29 LAB
ANION GAP SERPL CALCULATED.3IONS-SCNC: 3 MMOL/L (ref 4–13)
BUN SERPL-MCNC: 11 MG/DL (ref 5–25)
CALCIUM SERPL-MCNC: 9.6 MG/DL (ref 8.3–10.1)
CHLORIDE SERPL-SCNC: 106 MMOL/L (ref 96–108)
CHOLEST SERPL-MCNC: 177 MG/DL
CO2 SERPL-SCNC: 27 MMOL/L (ref 21–32)
CREAT SERPL-MCNC: 0.9 MG/DL (ref 0.6–1.3)
GFR SERPL CREATININE-BSD FRML MDRD: 95 ML/MIN/1.73SQ M
GLUCOSE P FAST SERPL-MCNC: 181 MG/DL (ref 65–99)
HDLC SERPL-MCNC: 44 MG/DL
LDLC SERPL CALC-MCNC: 106 MG/DL (ref 0–100)
NONHDLC SERPL-MCNC: 133 MG/DL
POTASSIUM SERPL-SCNC: 4 MMOL/L (ref 3.5–5.3)
SODIUM SERPL-SCNC: 136 MMOL/L (ref 135–147)
TRIGL SERPL-MCNC: 136 MG/DL

## 2023-05-19 ENCOUNTER — OFFICE VISIT (OUTPATIENT)
Dept: GASTROENTEROLOGY | Facility: CLINIC | Age: 58
End: 2023-05-19

## 2023-05-19 VITALS
BODY MASS INDEX: 28.14 KG/M2 | HEART RATE: 84 BPM | HEIGHT: 71 IN | DIASTOLIC BLOOD PRESSURE: 91 MMHG | WEIGHT: 201 LBS | SYSTOLIC BLOOD PRESSURE: 141 MMHG | OXYGEN SATURATION: 97 %

## 2023-05-19 DIAGNOSIS — K62.5 BRBPR (BRIGHT RED BLOOD PER RECTUM): ICD-10-CM

## 2023-05-19 DIAGNOSIS — K62.89 RECTAL PAIN: Primary | ICD-10-CM

## 2023-05-19 DIAGNOSIS — K21.9 GASTROESOPHAGEAL REFLUX DISEASE WITHOUT ESOPHAGITIS: ICD-10-CM

## 2023-05-19 RX ORDER — HYDROCORTISONE ACETATE 25 MG/1
25 SUPPOSITORY RECTAL 2 TIMES DAILY PRN
Qty: 12 SUPPOSITORY | Refills: 1 | Status: SHIPPED | OUTPATIENT
Start: 2023-05-19 | End: 2023-05-25

## 2023-05-19 RX ORDER — BISACODYL 5 MG/1
10 TABLET, DELAYED RELEASE ORAL ONCE
Qty: 2 TABLET | Refills: 0 | Status: SHIPPED | OUTPATIENT
Start: 2023-05-19 | End: 2023-05-19

## 2023-05-19 RX ORDER — OMEPRAZOLE 20 MG/1
CAPSULE, DELAYED RELEASE ORAL
COMMUNITY
Start: 2023-04-18

## 2023-05-19 RX ORDER — ONDANSETRON 4 MG/1
TABLET, FILM COATED ORAL
COMMUNITY
Start: 2023-04-18

## 2023-05-19 NOTE — PROGRESS NOTES
Consultation - [facility]  Miguel Bhaktaas 1965 male         Chief Complaint: Rectal discomfort    HPI: 52-year-old male with history of diabetes mellitus, hypertension, hyperlipidemia, GERD reports having discomfort in the rectum for about 4 months  He feels like hemorrhoids with occasional discomfort  He has regular bowel movements but thinks he strains hard because of rushing during bowel movements  Reports seeing fresh blood on the toilet paper at times  He had what appears to be hemorrhoidectomy about 20 years ago  Good appetite, no recent weight loss  He never had a colonoscopy but had a negative Cologuard test couple of years ago  He gives history of acid reflux for which she takes omeprazole and Pepcid as needed  Denies any difficulty swallowing  Chaperon: Ms Sweetie Stout: Review of Systems   Constitutional: Negative for activity change, appetite change, chills, diaphoresis, fatigue, fever and unexpected weight change  HENT: Negative for ear discharge, ear pain, facial swelling, hearing loss, nosebleeds, sore throat, tinnitus and voice change  Eyes: Negative for pain, discharge, redness, itching and visual disturbance  Respiratory: Negative for apnea, cough, chest tightness, shortness of breath and wheezing  Cardiovascular: Negative for chest pain and palpitations  Gastrointestinal:        As noted in HPI   Endocrine: Negative for cold intolerance, heat intolerance and polyuria  Genitourinary: Negative for difficulty urinating, dysuria, flank pain, hematuria and urgency  Musculoskeletal: Negative for arthralgias, back pain, gait problem, joint swelling and myalgias  Skin: Negative for rash and wound  Neurological: Negative for dizziness, tremors, seizures, speech difficulty, light-headedness, numbness and headaches  Hematological: Negative for adenopathy  Does not bruise/bleed easily     Psychiatric/Behavioral: Negative for agitation, behavioral problems and confusion  The patient is not nervous/anxious           Past Medical History:   Diagnosis Date   • Annual physical exam 12/29/2020   • Diabetes mellitus ([facility] [address] )    • Diabetes type 2, uncontrolled    • Fatty liver    • Hyperlipidemia    • Hypertension       Past Surgical History:   Procedure Laterality Date   • APPENDECTOMY       Social History     Socioeconomic History   • Marital status:      Spouse name: Not on file   • Number of children: Not on file   • Years of education: Not on file   • Highest education level: Not on file   Occupational History   • Not on file   Tobacco Use   • Smoking status: Former   • Smokeless tobacco: Never   Vaping Use   • Vaping Use: Never used   Substance and Sexual Activity   • Alcohol use: Yes     Comment: socially   • Drug use: Yes     Types: Marijuana     Comment: rarely   • Sexual activity: Not on file   Other Topics Concern   • Not on file   Social History Narrative   • Not on file     Social Determinants of Health     Financial Resource Strain: Not on file   Food Insecurity: Not on file   Transportation Needs: Not on file   Physical Activity: Not on file   Stress: Not on file   Social Connections: Not on file   Intimate Partner Violence: Not on file   Housing Stability: Not on file     Family History   Problem Relation Age of Onset   • Diabetes type II Mother    • Hypertension Father    • Skin cancer Father    • Diabetes type II Sister      Bydureon [exenatide]  Current Outpatient Medications   Medication Sig Dispense Refill   • Ronald Microlet Lancets lancets Use to test blood sugar twice a day 200 each 3   • BD Pen Needle Melany U/F 32G X 4 MM MISC USE TO ADMINISTER INSULIN  ONCE DAILY 90 each 1   • bisacodyl (DULCOLAX) 5 mg EC tablet Take 2 tablets (10 mg total) by mouth once for 1 dose 2 tablet 0   • famotidine (PEPCID) 20 mg tablet Take 20 mg by mouth as needed      • glucose 4 g chewable tablet Chew 4 tablets (16 g total) as needed for "low blood sugar 100 tablet 1   • glucose blood (Contour Next Test) test strip Use to test blood sugar twice a aday 200 each 3   • hydrocortisone (ANUSOL-HC) 25 mg suppository Insert 1 suppository (25 mg total) into the rectum 2 (two) times a day as needed for hemorrhoids for up to 6 days 12 suppository 1   • Insulin Glargine Solostar (Lantus SoloStar) 100 UNIT/ML SOPN Inject 27 units at night 30 mL 3   • lisinopril-hydrochlorothiazide (PRINZIDE,ZESTORETIC) 10-12 5 MG per tablet Take 1 tablet by mouth daily     • loratadine (CLARITIN) 10 mg tablet Take 10 mg by mouth daily     • metFORMIN (GLUCOPHAGE) 1000 MG tablet One tablet by mouth daily (Patient taking differently: One tablet by mouth daily  Takes with caution as needed) 90 tablet 1   • omeprazole (PriLOSEC) 20 mg delayed release capsule As needed     • ondansetron (ZOFRAN) 4 mg tablet As needed     • polyethylene glycol (GOLYTELY) 4000 mL solution Take 4,000 mL by mouth once for 1 dose 4000 mL 0   • rosuvastatin (CRESTOR) 10 MG tablet Take 10 mg by mouth 3 (three) times a week     • semaglutide, 1 mg/dose, (Ozempic, 1 MG/DOSE,) 4 MG/3ML SOPN injection pen Inject 0 75 mL (1 mg total) under the skin once a week 9 mL 3   • ALPRAZolam ER (XANAX XR) 0 5 MG 24 hr tablet  (Patient not taking: Reported on 7/20/2021)       No current facility-administered medications for this visit  Blood pressure 141/91, pulse 84, height 5' 11\" (1 803 m), weight 91 2 kg (201 lb), SpO2 97 %  PHYSICAL EXAM: Physical Exam  Constitutional:       Appearance: He is well-developed  HENT:      Head: Normocephalic and atraumatic  Eyes:      General: No scleral icterus  Right eye: No discharge  Left eye: No discharge  Conjunctiva/sclera: Conjunctivae normal       Pupils: Pupils are equal, round, and reactive to light  Neck:      Thyroid: No thyromegaly  Vascular: No JVD  Trachea: No tracheal deviation     Cardiovascular:      Rate and Rhythm: Normal " rate and regular rhythm  Heart sounds: Normal heart sounds  No murmur heard  No friction rub  No gallop  Pulmonary:      Effort: Pulmonary effort is normal  No respiratory distress  Breath sounds: Normal breath sounds  No wheezing or rales  Chest:      Chest wall: No tenderness  Abdominal:      General: Bowel sounds are normal  There is no distension  Palpations: Abdomen is soft  There is no mass  Tenderness: There is no abdominal tenderness  There is no guarding or rebound  Hernia: No hernia is present  Musculoskeletal:      Cervical back: Neck supple  Lymphadenopathy:      Cervical: No cervical adenopathy  Skin:     General: Skin is warm and dry  Findings: No erythema or rash  Neurological:      Mental Status: He is alert and oriented to person, place, and time  Psychiatric:         Behavior: Behavior normal          Thought Content: Thought content normal           Lab Results   Component Value Date    WBC 5 64 03/28/2023    HGB 15 4 03/28/2023    HCT 44 9 03/28/2023    MCV 91 03/28/2023     03/28/2023     Lab Results   Component Value Date    CALCIUM 9 6 03/28/2023    K 4 0 03/28/2023    CO2 27 03/28/2023     03/28/2023    BUN 11 03/28/2023    CREATININE 0 90 03/28/2023     Lab Results   Component Value Date    ALT 47 04/05/2022    AST 34 04/05/2022    ALKPHOS 63 04/05/2022     No results found for: INR, PROTIME    MRI cervical spine wo contrast    Result Date: 9/23/2021  Impression: Degenerative disc osteophyte complex with marginal osteophytes are seen with bony bar ridge eccentric to the right resulting in compression of the right side of the spinal cord without cord signal abnormality  Consultation with spinal surgery service is  recommended  There is developmental spinal canal narrowing from C2 through C6 with superimposed spondylotic degenerative change  Moderate central stenosis noted C4-5 with mild central stenosis at remaining levels  Workstation performed: [id number]       ASSESSMENT & PLAN:    Rectal pain  Symptoms appear to be most likely secondary to internal hemorrhoids  Doubt for anal fissure  Rule out colorectal lesions  -Advised patient avoid straining during defecation    -We will try Anusol HC suppositories for few days and then as needed    -Schedule for colonoscopy  -High-fiber diet fiber supplements and increase oral hydration     -Patient was given instructions about the colonoscopy prep     -Patient was explained about the risks and benefits of the procedure  Risks including but not limited to bleeding, infection, perforation were explained in detail  Also explained about less than 100% sensitivity with the exam and other alternatives  BRBPR (bright red blood per rectum)    -As described above    Gastroesophageal reflux disease without esophagitis  Gastroesophageal reflux disease - Patient has the symptoms of chronic acid reflux for a long time  Possible hiatal hernia or LES weakness  Should rule out Box's esophagus because of chronic symptoms     -Patient was explained about the lifestyle and dietary modifications  Advised to avoid fatty foods, chocolates, caffeine, alcohol and any other triggering foods    Avoid eating for at least 3 hours before going to bed     -May take Pepcid or Tums as needed    -Scheduled for EGD

## 2023-05-19 NOTE — H&P (VIEW-ONLY)
Consultation - [address] [facility]  Maico Samples 1965 male         Chief Complaint: Rectal discomfort    HPI: 52-year-old male with history of diabetes mellitus, hypertension, hyperlipidemia, GERD reports having discomfort in the rectum for about 4 months  He feels like hemorrhoids with occasional discomfort  He has regular bowel movements but thinks he strains hard because of rushing during bowel movements  Reports seeing fresh blood on the toilet paper at times  He had what appears to be hemorrhoidectomy about 20 years ago  Good appetite, no recent weight loss  He never had a colonoscopy but had a negative Cologuard test couple of years ago  He gives history of acid reflux for which she takes omeprazole and Pepcid as needed  Denies any difficulty swallowing  Chaperon: Ms Frias End: Review of Systems   Constitutional: Negative for activity change, appetite change, chills, diaphoresis, fatigue, fever and unexpected weight change  HENT: Negative for ear discharge, ear pain, facial swelling, hearing loss, nosebleeds, sore throat, tinnitus and voice change  Eyes: Negative for pain, discharge, redness, itching and visual disturbance  Respiratory: Negative for apnea, cough, chest tightness, shortness of breath and wheezing  Cardiovascular: Negative for chest pain and palpitations  Gastrointestinal:        As noted in HPI   Endocrine: Negative for cold intolerance, heat intolerance and polyuria  Genitourinary: Negative for difficulty urinating, dysuria, flank pain, hematuria and urgency  Musculoskeletal: Negative for arthralgias, back pain, gait problem, joint swelling and myalgias  Skin: Negative for rash and wound  Neurological: Negative for dizziness, tremors, seizures, speech difficulty, light-headedness, numbness and headaches  Hematological: Negative for adenopathy  Does not bruise/bleed easily     Psychiatric/Behavioral: Negative for agitation, behavioral problems and confusion  The patient is not nervous/anxious           Past Medical History:   Diagnosis Date   • Annual physical exam 12/29/2020   • Diabetes mellitus ([facility] [address] )    • Diabetes type 2, uncontrolled    • Fatty liver    • Hyperlipidemia    • Hypertension       Past Surgical History:   Procedure Laterality Date   • APPENDECTOMY       Social History     Socioeconomic History   • Marital status:      Spouse name: Not on file   • Number of children: Not on file   • Years of education: Not on file   • Highest education level: Not on file   Occupational History   • Not on file   Tobacco Use   • Smoking status: Former   • Smokeless tobacco: Never   Vaping Use   • Vaping Use: Never used   Substance and Sexual Activity   • Alcohol use: Yes     Comment: socially   • Drug use: Yes     Types: Marijuana     Comment: rarely   • Sexual activity: Not on file   Other Topics Concern   • Not on file   Social History Narrative   • Not on file     Social Determinants of Health     Financial Resource Strain: Not on file   Food Insecurity: Not on file   Transportation Needs: Not on file   Physical Activity: Not on file   Stress: Not on file   Social Connections: Not on file   Intimate Partner Violence: Not on file   Housing Stability: Not on file     Family History   Problem Relation Age of Onset   • Diabetes type II Mother    • Hypertension Father    • Skin cancer Father    • Diabetes type II Sister      Bydureon [exenatide]  Current Outpatient Medications   Medication Sig Dispense Refill   • Ronald Microlet Lancets lancets Use to test blood sugar twice a day 200 each 3   • BD Pen Needle Melany U/F 32G X 4 MM MISC USE TO ADMINISTER INSULIN  ONCE DAILY 90 each 1   • bisacodyl (DULCOLAX) 5 mg EC tablet Take 2 tablets (10 mg total) by mouth once for 1 dose 2 tablet 0   • famotidine (PEPCID) 20 mg tablet Take 20 mg by mouth as needed      • glucose 4 g chewable tablet Chew 4 tablets (16 g total) as needed for "low blood sugar 100 tablet 1   • glucose blood (Contour Next Test) test strip Use to test blood sugar twice a aday 200 each 3   • hydrocortisone (ANUSOL-HC) 25 mg suppository Insert 1 suppository (25 mg total) into the rectum 2 (two) times a day as needed for hemorrhoids for up to 6 days 12 suppository 1   • Insulin Glargine Solostar (Lantus SoloStar) 100 UNIT/ML SOPN Inject 27 units at night 30 mL 3   • lisinopril-hydrochlorothiazide (PRINZIDE,ZESTORETIC) 10-12 5 MG per tablet Take 1 tablet by mouth daily     • loratadine (CLARITIN) 10 mg tablet Take 10 mg by mouth daily     • metFORMIN (GLUCOPHAGE) 1000 MG tablet One tablet by mouth daily (Patient taking differently: One tablet by mouth daily  Takes with caution as needed) 90 tablet 1   • omeprazole (PriLOSEC) 20 mg delayed release capsule As needed     • ondansetron (ZOFRAN) 4 mg tablet As needed     • polyethylene glycol (GOLYTELY) 4000 mL solution Take 4,000 mL by mouth once for 1 dose 4000 mL 0   • rosuvastatin (CRESTOR) 10 MG tablet Take 10 mg by mouth 3 (three) times a week     • semaglutide, 1 mg/dose, (Ozempic, 1 MG/DOSE,) 4 MG/3ML SOPN injection pen Inject 0 75 mL (1 mg total) under the skin once a week 9 mL 3   • ALPRAZolam ER (XANAX XR) 0 5 MG 24 hr tablet  (Patient not taking: Reported on 7/20/2021)       No current facility-administered medications for this visit  Blood pressure 141/91, pulse 84, height 5' 11\" (1 803 m), weight 91 2 kg (201 lb), SpO2 97 %  PHYSICAL EXAM: Physical Exam  Constitutional:       Appearance: He is well-developed  HENT:      Head: Normocephalic and atraumatic  Eyes:      General: No scleral icterus  Right eye: No discharge  Left eye: No discharge  Conjunctiva/sclera: Conjunctivae normal       Pupils: Pupils are equal, round, and reactive to light  Neck:      Thyroid: No thyromegaly  Vascular: No JVD  Trachea: No tracheal deviation     Cardiovascular:      Rate and Rhythm: Normal " rate and regular rhythm  Heart sounds: Normal heart sounds  No murmur heard  No friction rub  No gallop  Pulmonary:      Effort: Pulmonary effort is normal  No respiratory distress  Breath sounds: Normal breath sounds  No wheezing or rales  Chest:      Chest wall: No tenderness  Abdominal:      General: Bowel sounds are normal  There is no distension  Palpations: Abdomen is soft  There is no mass  Tenderness: There is no abdominal tenderness  There is no guarding or rebound  Hernia: No hernia is present  Musculoskeletal:      Cervical back: Neck supple  Lymphadenopathy:      Cervical: No cervical adenopathy  Skin:     General: Skin is warm and dry  Findings: No erythema or rash  Neurological:      Mental Status: He is alert and oriented to person, place, and time  Psychiatric:         Behavior: Behavior normal          Thought Content: Thought content normal           Lab Results   Component Value Date    WBC 5 64 03/28/2023    HGB 15 4 03/28/2023    HCT 44 9 03/28/2023    MCV 91 03/28/2023     03/28/2023     Lab Results   Component Value Date    CALCIUM 9 6 03/28/2023    K 4 0 03/28/2023    CO2 27 03/28/2023     03/28/2023    BUN 11 03/28/2023    CREATININE 0 90 03/28/2023     Lab Results   Component Value Date    ALT 47 04/05/2022    AST 34 04/05/2022    ALKPHOS 63 04/05/2022     No results found for: INR, PROTIME    MRI cervical spine wo contrast    Result Date: 9/23/2021  Impression: Degenerative disc osteophyte complex with marginal osteophytes are seen with bony bar ridge eccentric to the right resulting in compression of the right side of the spinal cord without cord signal abnormality  Consultation with spinal surgery service is  recommended  There is developmental spinal canal narrowing from C2 through C6 with superimposed spondylotic degenerative change  Moderate central stenosis noted C4-5 with mild central stenosis at remaining levels  Workstation performed: [id number]       ASSESSMENT & PLAN:    Rectal pain  Symptoms appear to be most likely secondary to internal hemorrhoids  Doubt for anal fissure  Rule out colorectal lesions  -Advised patient avoid straining during defecation    -We will try Anusol HC suppositories for few days and then as needed    -Schedule for colonoscopy  -High-fiber diet fiber supplements and increase oral hydration     -Patient was given instructions about the colonoscopy prep     -Patient was explained about the risks and benefits of the procedure  Risks including but not limited to bleeding, infection, perforation were explained in detail  Also explained about less than 100% sensitivity with the exam and other alternatives  BRBPR (bright red blood per rectum)    -As described above    Gastroesophageal reflux disease without esophagitis  Gastroesophageal reflux disease - Patient has the symptoms of chronic acid reflux for a long time  Possible hiatal hernia or LES weakness  Should rule out Box's esophagus because of chronic symptoms     -Patient was explained about the lifestyle and dietary modifications  Advised to avoid fatty foods, chocolates, caffeine, alcohol and any other triggering foods    Avoid eating for at least 3 hours before going to bed     -May take Pepcid or Tums as needed    -Scheduled for EGD

## 2023-05-19 NOTE — ASSESSMENT & PLAN NOTE
Symptoms appear to be most likely secondary to internal hemorrhoids  Doubt for anal fissure  Rule out colorectal lesions  -Advised patient avoid straining during defecation    -We will try Anusol HC suppositories for few days and then as needed    -Schedule for colonoscopy  -High-fiber diet fiber supplements and increase oral hydration     -Patient was given instructions about the colonoscopy prep     -Patient was explained about the risks and benefits of the procedure  Risks including but not limited to bleeding, infection, perforation were explained in detail  Also explained about less than 100% sensitivity with the exam and other alternatives

## 2023-05-19 NOTE — PATIENT INSTRUCTIONS
Scheduled date of EGD/colonoscopy (as of today): 06/08/23  Physician performing EGD/colonoscopy: GEORGE  [facility]  Desired bowel prep reviewed with patient: GOLYTELY  Instructions reviewed with patient by: VIRGINIA  Clearances: NONE

## 2023-05-19 NOTE — ASSESSMENT & PLAN NOTE
Gastroesophageal reflux disease - Patient has the symptoms of chronic acid reflux for a long time  Possible hiatal hernia or LES weakness  Should rule out Box's esophagus because of chronic symptoms     -Patient was explained about the lifestyle and dietary modifications  Advised to avoid fatty foods, chocolates, caffeine, alcohol and any other triggering foods    Avoid eating for at least 3 hours before going to bed     -May take Pepcid or Tums as needed    -Scheduled for EGD

## 2023-06-05 ENCOUNTER — LAB (OUTPATIENT)
Dept: LAB | Facility: CLINIC | Age: 58
End: 2023-06-05
Payer: COMMERCIAL

## 2023-06-05 DIAGNOSIS — Z79.4 TYPE 2 DIABETES MELLITUS WITH HYPERGLYCEMIA, WITH LONG-TERM CURRENT USE OF INSULIN (HCC): ICD-10-CM

## 2023-06-05 DIAGNOSIS — E11.65 TYPE 2 DIABETES MELLITUS WITH HYPERGLYCEMIA, WITH LONG-TERM CURRENT USE OF INSULIN (HCC): ICD-10-CM

## 2023-06-05 LAB
EST. AVERAGE GLUCOSE BLD GHB EST-MCNC: 217 MG/DL
HBA1C MFR BLD: 9.2 %

## 2023-06-05 PROCEDURE — 83036 HEMOGLOBIN GLYCOSYLATED A1C: CPT

## 2023-06-05 PROCEDURE — 36415 COLL VENOUS BLD VENIPUNCTURE: CPT

## 2023-06-06 ENCOUNTER — TELEPHONE (OUTPATIENT)
Dept: OTHER | Facility: HOSPITAL | Age: 58
End: 2023-06-06

## 2023-06-08 ENCOUNTER — ANESTHESIA EVENT (OUTPATIENT)
Dept: GASTROENTEROLOGY | Facility: AMBULARY SURGERY CENTER | Age: 58
End: 2023-06-08

## 2023-06-08 ENCOUNTER — ANESTHESIA (OUTPATIENT)
Dept: GASTROENTEROLOGY | Facility: AMBULARY SURGERY CENTER | Age: 58
End: 2023-06-08

## 2023-06-08 ENCOUNTER — HOSPITAL ENCOUNTER (OUTPATIENT)
Dept: GASTROENTEROLOGY | Facility: AMBULARY SURGERY CENTER | Age: 58
Setting detail: OUTPATIENT SURGERY
End: 2023-06-08
Attending: INTERNAL MEDICINE
Payer: COMMERCIAL

## 2023-06-08 VITALS
DIASTOLIC BLOOD PRESSURE: 83 MMHG | OXYGEN SATURATION: 98 % | RESPIRATION RATE: 18 BRPM | BODY MASS INDEX: 28.59 KG/M2 | HEART RATE: 81 BPM | WEIGHT: 205 LBS | SYSTOLIC BLOOD PRESSURE: 156 MMHG | TEMPERATURE: 97.5 F

## 2023-06-08 DIAGNOSIS — K62.5 BRBPR (BRIGHT RED BLOOD PER RECTUM): ICD-10-CM

## 2023-06-08 DIAGNOSIS — K21.9 GASTROESOPHAGEAL REFLUX DISEASE WITHOUT ESOPHAGITIS: ICD-10-CM

## 2023-06-08 DIAGNOSIS — K62.89 RECTAL PAIN: ICD-10-CM

## 2023-06-08 LAB — GLUCOSE SERPL-MCNC: 139 MG/DL (ref 65–140)

## 2023-06-08 PROCEDURE — 82948 REAGENT STRIP/BLOOD GLUCOSE: CPT

## 2023-06-08 PROCEDURE — 88313 SPECIAL STAINS GROUP 2: CPT | Performed by: PATHOLOGY

## 2023-06-08 PROCEDURE — 88305 TISSUE EXAM BY PATHOLOGIST: CPT | Performed by: PATHOLOGY

## 2023-06-08 PROCEDURE — 88342 IMHCHEM/IMCYTCHM 1ST ANTB: CPT | Performed by: PATHOLOGY

## 2023-06-08 PROCEDURE — 88341 IMHCHEM/IMCYTCHM EA ADD ANTB: CPT | Performed by: PATHOLOGY

## 2023-06-08 RX ORDER — SODIUM CHLORIDE, SODIUM LACTATE, POTASSIUM CHLORIDE, CALCIUM CHLORIDE 600; 310; 30; 20 MG/100ML; MG/100ML; MG/100ML; MG/100ML
125 INJECTION, SOLUTION INTRAVENOUS CONTINUOUS
Status: DISCONTINUED | OUTPATIENT
Start: 2023-06-08 | End: 2023-06-12 | Stop reason: HOSPADM

## 2023-06-08 RX ORDER — PROPOFOL 10 MG/ML
INJECTION, EMULSION INTRAVENOUS AS NEEDED
Status: DISCONTINUED | OUTPATIENT
Start: 2023-06-08 | End: 2023-06-08

## 2023-06-08 RX ORDER — LIDOCAINE HYDROCHLORIDE 10 MG/ML
INJECTION, SOLUTION EPIDURAL; INFILTRATION; INTRACAUDAL; PERINEURAL AS NEEDED
Status: DISCONTINUED | OUTPATIENT
Start: 2023-06-08 | End: 2023-06-08

## 2023-06-08 RX ADMIN — PROPOFOL 170 MG: 10 INJECTION, EMULSION INTRAVENOUS at 07:37

## 2023-06-08 RX ADMIN — PROPOFOL 50 MG: 10 INJECTION, EMULSION INTRAVENOUS at 07:45

## 2023-06-08 RX ADMIN — PROPOFOL 40 MG: 10 INJECTION, EMULSION INTRAVENOUS at 07:51

## 2023-06-08 RX ADMIN — SODIUM CHLORIDE, SODIUM LACTATE, POTASSIUM CHLORIDE, AND CALCIUM CHLORIDE: .6; .31; .03; .02 INJECTION, SOLUTION INTRAVENOUS at 07:34

## 2023-06-08 RX ADMIN — PROPOFOL 50 MG: 10 INJECTION, EMULSION INTRAVENOUS at 07:41

## 2023-06-08 RX ADMIN — LIDOCAINE HYDROCHLORIDE 50 MG: 10 INJECTION, SOLUTION EPIDURAL; INFILTRATION; INTRACAUDAL; PERINEURAL at 07:37

## 2023-06-08 RX ADMIN — PROPOFOL 30 MG: 10 INJECTION, EMULSION INTRAVENOUS at 07:47

## 2023-06-08 NOTE — INTERVAL H&P NOTE
H&P reviewed  After examining the patient I find no changes in the patients condition since the H&P had been written      Vitals:    06/08/23 0702   BP: 145/78   Pulse: 80   Resp: 16   Temp: 97 5 °F (36 4 °C)   SpO2: 97%

## 2023-06-08 NOTE — ANESTHESIA PREPROCEDURE EVALUATION
Procedure:  COLONOSCOPY  EGD    Relevant Problems   CARDIO   (+) Essential hypertension   (+) Hyperlipidemia      ENDO   (+) Type 2 diabetes mellitus without complication, without long-term current use of insulin (HCC)      GI/HEPATIC   (+) BRBPR (bright red blood per rectum)   (+) Gastroesophageal reflux disease without esophagitis        Physical Exam    Airway    Mallampati score: II  TM Distance: >3 FB  Neck ROM: full     Dental   Comment: Very poor dentition  Risks explined, No notable dental hx     Cardiovascular  Cardiovascular exam normal    Pulmonary  Pulmonary exam normal     Other Findings        Anesthesia Plan  ASA Score- 2     Anesthesia Type- IV sedation with anesthesia with ASA Monitors  Additional Monitors:   Airway Plan:           Plan Factors-Exercise tolerance (METS): >4 METS  Chart reviewed  Existing labs reviewed  Patient summary reviewed  Patient is not a current smoker  Obstructive sleep apnea risk education given perioperatively  Induction-     Postoperative Plan-     Informed Consent- Anesthetic plan and risks discussed with patient  I personally reviewed this patient with the CRNA  Discussed and agreed on the Anesthesia Plan with the CRNA  Carmencita Hancock

## 2023-06-08 NOTE — ANESTHESIA POSTPROCEDURE EVALUATION
Post-Op Assessment Note    CV Status:  Stable  Pain Score: 0    Pain management: adequate     Mental Status:  Alert and awake   Hydration Status:  Euvolemic and stable   PONV Controlled:  Controlled   Airway Patency:  Patent      Post Op Vitals Reviewed: Yes      Staff: CRNA         No notable events documented      /83 (06/08/23 0811)    Temp      Pulse 81 (06/08/23 0811)   Resp 18 (06/08/23 0811)    SpO2 97 % (06/08/23 0811)

## 2023-06-14 PROCEDURE — 88342 IMHCHEM/IMCYTCHM 1ST ANTB: CPT | Performed by: PATHOLOGY

## 2023-06-14 PROCEDURE — 88313 SPECIAL STAINS GROUP 2: CPT | Performed by: PATHOLOGY

## 2023-06-14 PROCEDURE — 88305 TISSUE EXAM BY PATHOLOGIST: CPT | Performed by: PATHOLOGY

## 2023-06-14 PROCEDURE — 88341 IMHCHEM/IMCYTCHM EA ADD ANTB: CPT | Performed by: PATHOLOGY

## 2023-06-16 ENCOUNTER — OFFICE VISIT (OUTPATIENT)
Dept: ENDOCRINOLOGY | Facility: CLINIC | Age: 58
End: 2023-06-16
Payer: COMMERCIAL

## 2023-06-16 VITALS
SYSTOLIC BLOOD PRESSURE: 140 MMHG | DIASTOLIC BLOOD PRESSURE: 80 MMHG | BODY MASS INDEX: 28.42 KG/M2 | WEIGHT: 203 LBS | HEART RATE: 80 BPM | HEIGHT: 71 IN

## 2023-06-16 DIAGNOSIS — K64.9 HEMORRHOIDS, UNSPECIFIED HEMORRHOID TYPE: ICD-10-CM

## 2023-06-16 DIAGNOSIS — E78.2 MIXED HYPERLIPIDEMIA: ICD-10-CM

## 2023-06-16 DIAGNOSIS — I10 ESSENTIAL HYPERTENSION: ICD-10-CM

## 2023-06-16 DIAGNOSIS — Z79.4 TYPE 2 DIABETES MELLITUS WITH HYPERGLYCEMIA, WITH LONG-TERM CURRENT USE OF INSULIN (HCC): Primary | ICD-10-CM

## 2023-06-16 DIAGNOSIS — E66.3 OVERWEIGHT (BMI 25.0-29.9): ICD-10-CM

## 2023-06-16 DIAGNOSIS — E11.65 TYPE 2 DIABETES MELLITUS WITH HYPERGLYCEMIA, WITH LONG-TERM CURRENT USE OF INSULIN (HCC): Primary | ICD-10-CM

## 2023-06-16 PROCEDURE — 99215 OFFICE O/P EST HI 40 MIN: CPT | Performed by: INTERNAL MEDICINE

## 2023-06-16 RX ORDER — METFORMIN HYDROCHLORIDE 500 MG/1
1000 TABLET, EXTENDED RELEASE ORAL 2 TIMES DAILY WITH MEALS
Qty: 360 TABLET | Refills: 3 | Status: SHIPPED | OUTPATIENT
Start: 2023-06-16

## 2023-06-16 RX ORDER — INSULIN GLARGINE 100 [IU]/ML
INJECTION, SOLUTION SUBCUTANEOUS
Qty: 30 ML | Refills: 3
Start: 2023-06-16

## 2023-06-16 NOTE — PROGRESS NOTES
Abraham Rinne 65 y o  male MRN: [id number]    Encounter: [id number]      Assessment/Plan     1  Type 2 diabetes mellitus long-term insulin therapy with hyperglycemia  2  Overweight, BMI 28 3  3  Gastric side effects with metformin  Poorly controlled last A1c 9 2% which has trended up    Recommend the following at this time  Increase Ozempic to 2 mg subcutaneously weekly  Decrease Lantus to 24 units at bedtime  Trial of metformin XR start 500 mg orally daily, titrate up as tolerated to 1000 mg orally twice a day  If patient has any low/tight blood sugars, decrease Lantus further to 20 units and let me know  - check blood sugars more often before meals and at bedtime keep a log  - follow up with ophthalmology   Labs in 3 months prior to follow up     4  Hyperlipidemia - LDL trended up  -increase statin therapy to 4 times a week     5  Hypertension  Blood pressure at goal   - continue current medications including ACE-I/ ARB    6  Hemorrhoids - follow up with GI     I have spent a total time of 40 minutes on 06/16/23 in caring for this patient      CC: Diabetes    History of Present Illness     HPI:  Abraham Rinne is a 65 y o  male presents for a follow-up visit regarding diabetes management  Last seen in 2/2023  Last Eye exam: approx 1 year pr patient, No DR     Current regimen:   ozmepic 1 mg subcutaneously weekly  lantus 27 unust at bedtime     Stopped taking metformin in March due to diarrhea  Has not tried metformin XR   Has numbness/tingling, occasional pain at night   Recently has been using cream with improvement in symptoms (soothing and cooling neuropathy topical cream)    Hemorrhoids which have been bothersome, will be following up with GI     Statin: crestor three times a week , higher caused constipation   ACE-I/ARB: Lisinopril    Home glucose monitoring:on recall , 3-4 times a week   Before breakfast:  mg/dl   Symptoms of hypoglycemia :  No lows     All other systems were reviewed and are negative      Review of Systems      Historical Information   Past Medical History:   Diagnosis Date   • Anesthesia complication     difficulty waking up   • Annual physical exam 12/29/2020   • Diabetes mellitus ([address] )    • Diabetes type 2, uncontrolled    • Fatty liver    • GERD (gastroesophageal reflux disease)    • Hemorrhoid    • Hyperlipidemia    • Hypertension    • Rectal bleeding    • Wears glasses    • Wears partial dentures     upper     Past Surgical History:   Procedure Laterality Date   • APPENDECTOMY       Social History   Social History     Substance and Sexual Activity   Alcohol Use Yes    Comment: socially     Social History     Substance and Sexual Activity   Drug Use Yes   • Types: Marijuana    Comment: rarely     Social History     Tobacco Use   Smoking Status Former   Smokeless Tobacco Never     Family History:   Family History   Problem Relation Age of Onset   • Diabetes type II Mother    • Hypertension Father    • Skin cancer Father    • Diabetes type II Sister        Meds/Allergies   Current Outpatient Medications   Medication Sig Dispense Refill   • ALPRAZolam ER (XANAX XR) 0 5 MG 24 hr tablet if needed     • Ronald Microlet Lancets lancets Use to test blood sugar twice a day 200 each 3   • BD Pen Needle Melany U/F 32G X 4 MM MISC USE TO ADMINISTER INSULIN  ONCE DAILY 90 each 1   • cholecalciferol (VITAMIN D3) 1,000 units tablet Take 1,000 Units by mouth daily     • CINNAMON PO Take by mouth 2 (two) times a day Sugar-guard  Last dose 6/5/23     • famotidine (PEPCID) 20 mg tablet Take 20 mg by mouth as needed      • fexofenadine (ALLEGRA) 180 MG tablet Take 180 mg by mouth every morning     • FIBER ADULT GUMMIES PO Take by mouth in the morning     • glucose 4 g chewable tablet Chew 4 tablets (16 g total) as needed for low blood sugar 100 tablet 1   • glucose blood (Contour Next Test) test strip Use to test blood sugar twice a aday 200 each 3   • hydrocortisone (ANUSOL-HC) 25 mg suppository Insert 1 "suppository (25 mg total) into the rectum 2 (two) times a day as needed for hemorrhoids for up to 6 days 12 suppository 1   • Insulin Glargine Solostar (Lantus SoloStar) 100 UNIT/ML SOPN Inject 27 units at night 30 mL 3   • lisinopril-hydrochlorothiazide (PRINZIDE,ZESTORETIC) 10-12 5 MG per tablet Take 1 tablet by mouth every morning     • loratadine (CLARITIN) 10 mg tablet Take 10 mg by mouth if needed     • omeprazole (PriLOSEC) 20 mg delayed release capsule As needed     • ondansetron (ZOFRAN) 4 mg tablet As needed     • rosuvastatin (CRESTOR) 10 MG tablet Take 10 mg by mouth 3 (three) times a week M-W-F     • semaglutide, 1 mg/dose, (Ozempic, 1 MG/DOSE,) 4 MG/3ML SOPN injection pen Inject 0 75 mL (1 mg total) under the skin once a week (Patient taking differently: Inject 1 mg under the skin once a week On Fridays) 9 mL 3   • bisacodyl (DULCOLAX) 5 mg EC tablet Take 2 tablets (10 mg total) by mouth once for 1 dose 2 tablet 0   • metFORMIN (GLUCOPHAGE) 1000 MG tablet One tablet by mouth daily (Patient not taking: Reported on 6/16/2023) 90 tablet 1   • polyethylene glycol (GOLYTELY) 4000 mL solution Take 4,000 mL by mouth once for 1 dose 4000 mL 0     No current facility-administered medications for this visit  Allergies   Allergen Reactions   • Bydureon [Exenatide] Rash              Objective   Vitals: Blood pressure 140/80, pulse 80, height 5' 11\" (1 803 m), weight 92 1 kg (203 lb)  Physical Exam  Constitutional:       General: He is not in acute distress  Appearance: He is well-developed  He is not diaphoretic  HENT:      Head: Normocephalic and atraumatic  Eyes:      Conjunctiva/sclera: Conjunctivae normal       Pupils: Pupils are equal, round, and reactive to light  Cardiovascular:      Rate and Rhythm: Normal rate and regular rhythm  Pulses:           Dorsalis pedis pulses are 2+ on the right side and 2+ on the left side  Heart sounds: Normal heart sounds   No murmur " heard   Pulmonary:      Effort: Pulmonary effort is normal  No respiratory distress  Breath sounds: Normal breath sounds  No wheezing  Abdominal:      General: There is no distension  Palpations: Abdomen is soft  Tenderness: There is no abdominal tenderness  There is no guarding  Musculoskeletal:      Cervical back: Normal range of motion and neck supple  Feet:      Right foot:      Skin integrity: No ulcer, skin breakdown, erythema, warmth, callus or dry skin  Left foot:      Skin integrity: No ulcer, skin breakdown, erythema, warmth, callus or dry skin  Skin:     General: Skin is warm and dry  Findings: No erythema or rash  Neurological:      Mental Status: He is alert and oriented to person, place, and time  Psychiatric:         Behavior: Behavior normal          Thought Content: Thought content normal        Diabetic Foot Exam    Patient's shoes and socks removed  Right Foot/Ankle   Right Foot Inspection  Skin Exam: skin normal and skin intact  No dry skin, no warmth, no callus, no erythema, no maceration, no abnormal color, no pre-ulcer, no ulcer and no callus  Sensory   Vibration: intact  Proprioception: intact  Monofilament testing: diminished    Vascular  Capillary refills: < 3 seconds  The right DP pulse is 2+  Left Foot/Ankle  Left Foot Inspection  Skin Exam: skin normal and skin intact  No dry skin, no warmth, no erythema, no maceration, normal color, no pre-ulcer, no ulcer and no callus  Sensory   Vibration: intact  Proprioception: intact  Monofilament testing: intact    Vascular  Capillary refills: < 3 seconds  The left DP pulse is 2+  The history was obtained from the review of the chart, patient      Lab Results:   Lab Results   Component Value Date/Time    Hemoglobin A1C 9 2 (H) 06/05/2023 10:44 AM    Hemoglobin A1C 8 0 (A) 02/16/2023 10:08 AM    WBC 5 64 03/28/2023 10:45 AM    Hemoglobin 15 4 03/28/2023 10:45 AM    Hematocrit 44 9 03/28/2023 10:45 "AM    MCV 91 03/28/2023 10:45 AM    Platelets 956 87/61/3991 10:45 AM    BUN 11 03/28/2023 10:45 AM    Potassium 4 0 03/28/2023 10:45 AM    Chloride 106 03/28/2023 10:45 AM    CO2 27 03/28/2023 10:45 AM    Creatinine 0 90 03/28/2023 10:45 AM    HDL, Direct 44 03/28/2023 10:45 AM    Triglycerides 136 03/28/2023 10:45 AM         Imaging Studies: I have personally reviewed pertinent reports  Portions of the record may have been created with voice recognition software  Occasional wrong word or \"sound a like\" substitutions may have occurred due to the inherent limitations of voice recognition software  Read the chart carefully and recognize, using context, where substitutions have occurred       "

## 2023-06-16 NOTE — PATIENT INSTRUCTIONS
Increase Ozempic to 2 mg subcutaneously weekly  Decrease Lantus to 24 units at bedtime  Trial of metformin XR start 500 mg orally daily, after 3 to 4 days if you have no gastric side effects increase to 2 tablets and gradually to 4 tablets as tolerated 1000 mg orally twice a day  If you have any low/tight blood sugars, decrease Lantus further to 20 units and let me know  Please check blood sugars more often before meals and at bedtime keep a log

## 2023-06-20 ENCOUNTER — TELEPHONE (OUTPATIENT)
Dept: ENDOCRINOLOGY | Facility: CLINIC | Age: 58
End: 2023-06-20

## 2023-06-20 NOTE — TELEPHONE ENCOUNTER
[facility] faxed refill request for Contour Next meter, test strips & lancets  Called pt and l/m requesting call back to see if he would like Rx's sent to [facility]  Mentioned that Rx's will not be sent until he calls back to confirm

## 2023-07-21 DIAGNOSIS — Z79.4 TYPE 2 DIABETES MELLITUS WITH HYPERGLYCEMIA, WITH LONG-TERM CURRENT USE OF INSULIN (HCC): Primary | ICD-10-CM

## 2023-07-21 DIAGNOSIS — E11.65 TYPE 2 DIABETES MELLITUS WITH HYPERGLYCEMIA, WITH LONG-TERM CURRENT USE OF INSULIN (HCC): Primary | ICD-10-CM

## 2023-07-21 RX ORDER — BLOOD-GLUCOSE METER
EACH MISCELLANEOUS 2 TIMES DAILY
Qty: 1 KIT | Refills: 0 | Status: SHIPPED | OUTPATIENT
Start: 2023-07-21

## 2023-07-21 RX ORDER — PERPHENAZINE 16 MG/1
TABLET, FILM COATED ORAL
Qty: 200 EACH | Refills: 3 | Status: SHIPPED | OUTPATIENT
Start: 2023-07-21

## 2023-07-21 NOTE — TELEPHONE ENCOUNTER
Received Fax for iSnap next one kit, test strips, Lancets.  Please review and clarify amounts or doses

## 2023-09-18 ENCOUNTER — APPOINTMENT (OUTPATIENT)
Dept: LAB | Facility: CLINIC | Age: 58
End: 2023-09-18
Payer: COMMERCIAL

## 2023-09-18 DIAGNOSIS — E11.65 TYPE 2 DIABETES MELLITUS WITH HYPERGLYCEMIA, WITH LONG-TERM CURRENT USE OF INSULIN (HCC): ICD-10-CM

## 2023-09-18 DIAGNOSIS — Z79.4 TYPE 2 DIABETES MELLITUS WITH HYPERGLYCEMIA, WITH LONG-TERM CURRENT USE OF INSULIN (HCC): ICD-10-CM

## 2023-09-18 DIAGNOSIS — I10 ESSENTIAL HYPERTENSION: ICD-10-CM

## 2023-09-18 DIAGNOSIS — E78.2 MIXED HYPERLIPIDEMIA: ICD-10-CM

## 2023-09-18 LAB
ALBUMIN SERPL BCP-MCNC: 4.3 G/DL (ref 3.5–5)
ALP SERPL-CCNC: 67 U/L (ref 34–104)
ALT SERPL W P-5'-P-CCNC: 51 U/L (ref 7–52)
ANION GAP SERPL CALCULATED.3IONS-SCNC: 8 MMOL/L
AST SERPL W P-5'-P-CCNC: 35 U/L (ref 13–39)
BILIRUB SERPL-MCNC: 1.41 MG/DL (ref 0.2–1)
BUN SERPL-MCNC: 13 MG/DL (ref 5–25)
CALCIUM SERPL-MCNC: 9.5 MG/DL (ref 8.4–10.2)
CHLORIDE SERPL-SCNC: 103 MMOL/L (ref 96–108)
CHOLEST SERPL-MCNC: 138 MG/DL
CO2 SERPL-SCNC: 28 MMOL/L (ref 21–32)
CREAT SERPL-MCNC: 0.87 MG/DL (ref 0.6–1.3)
EST. AVERAGE GLUCOSE BLD GHB EST-MCNC: 209 MG/DL
GFR SERPL CREATININE-BSD FRML MDRD: 96 ML/MIN/1.73SQ M
GLUCOSE P FAST SERPL-MCNC: 197 MG/DL (ref 65–99)
HBA1C MFR BLD: 8.9 %
HDLC SERPL-MCNC: 42 MG/DL
LDLC SERPL CALC-MCNC: 70 MG/DL (ref 0–100)
NONHDLC SERPL-MCNC: 96 MG/DL
POTASSIUM SERPL-SCNC: 4 MMOL/L (ref 3.5–5.3)
PROT SERPL-MCNC: 6.9 G/DL (ref 6.4–8.4)
SODIUM SERPL-SCNC: 139 MMOL/L (ref 135–147)
TRIGL SERPL-MCNC: 132 MG/DL

## 2023-09-18 PROCEDURE — 36415 COLL VENOUS BLD VENIPUNCTURE: CPT

## 2023-09-18 PROCEDURE — 80061 LIPID PANEL: CPT

## 2023-09-18 PROCEDURE — 83036 HEMOGLOBIN GLYCOSYLATED A1C: CPT

## 2023-09-18 PROCEDURE — 80053 COMPREHEN METABOLIC PANEL: CPT

## 2023-09-22 ENCOUNTER — OFFICE VISIT (OUTPATIENT)
Dept: ENDOCRINOLOGY | Facility: CLINIC | Age: 58
End: 2023-09-22
Payer: COMMERCIAL

## 2023-09-22 VITALS
HEART RATE: 70 BPM | DIASTOLIC BLOOD PRESSURE: 90 MMHG | HEIGHT: 71 IN | OXYGEN SATURATION: 98 % | BODY MASS INDEX: 28 KG/M2 | WEIGHT: 200 LBS | SYSTOLIC BLOOD PRESSURE: 140 MMHG

## 2023-09-22 DIAGNOSIS — E78.2 MIXED HYPERLIPIDEMIA: ICD-10-CM

## 2023-09-22 DIAGNOSIS — E66.3 OVERWEIGHT (BMI 25.0-29.9): ICD-10-CM

## 2023-09-22 DIAGNOSIS — I10 PRIMARY HYPERTENSION: ICD-10-CM

## 2023-09-22 DIAGNOSIS — I10 ESSENTIAL HYPERTENSION: ICD-10-CM

## 2023-09-22 DIAGNOSIS — Z79.4 TYPE 2 DIABETES MELLITUS WITH HYPERGLYCEMIA, WITH LONG-TERM CURRENT USE OF INSULIN (HCC): Primary | ICD-10-CM

## 2023-09-22 DIAGNOSIS — E11.65 TYPE 2 DIABETES MELLITUS WITH HYPERGLYCEMIA, WITH LONG-TERM CURRENT USE OF INSULIN (HCC): Primary | ICD-10-CM

## 2023-09-22 PROCEDURE — 99214 OFFICE O/P EST MOD 30 MIN: CPT | Performed by: INTERNAL MEDICINE

## 2023-09-22 NOTE — PATIENT INSTRUCTIONS
discontinue Ozempic  Start Mounjaro 2.5 mg subcutaneously weekly  Please check blood sugars more often before meals and at bedtime, send log for review in 2 weeks  If you have any side effects, please let me know  If you are tolerating Mounjaro well then do a trial of metformin  mg orally daily

## 2023-09-22 NOTE — PROGRESS NOTES
Melissa Gavin 65 y.o. male MRN: [id number]    Encounter: [id number]      Assessment/Plan     1. Type 2 diabetes mellitus on long term insulin insulin therapy with hyperglycemia   2. Overweight BMI 27.8  Last A1c 8.9%, poorly controlled   Ozempic 2 mg not available; discussed different options     Discussed different medications that can be used for glycemic management-oral hypoglycemic agents as well as injectable insulin and non-insulin medications (GLP 1 agonist), Tirzepatide (GLP1 Agonist/GIP) along with risks, benefits, side effect profile. No h/o pancreatitis/ MEN syndrome/ Medullary thyroid cancer      Recommend the following at this time  discontinue Ozempic  Start Mounjaro 2.5 mg subcutaneously weekly  -check blood sugars more often before meals and at bedtime, send log for review in 2 weeks; If you have any side effects, please let me know  Will do a trial of metformin  mg orally daily  Follow up in 3 months, with repeat labs     3. Hyperlipidemia  - continue statin therapy    4. Hypertension  Blood pressure at goal   - continue current medications including ACE-I/ ARB      CC: Diabetes    History of Present Illness     HPI:  Melissa Gavin is a 65 y.o. male presents for a follow-up visit regarding diabetes management. Last seen in 6/2023  Last Eye exam: due for an exam     Current regimen:   Ozempic 2 mg subcutaneously weekly  Lantus 24 units at bedtime  Metformin XR -able to tolerate 500 mg daily only; got cramps when he increased the dose to twice a day     Statin: Crestor 10, 4 times a week  ACE-I/ARB:  Lisinopril     Home glucose monitoring:on recall, twice a day   150-250   Before breakfast:  160-- 180  Late lunch/ Before dinner:  200s  Bedtime: 200-250     Symptoms of hypoglycemia :     All other systems were reviewed and are negative.     Review of Systems      Historical Information   Past Medical History:   Diagnosis Date   • Anesthesia complication     difficulty waking up   • Annual physical exam 2020   • Diabetes mellitus ([address] [facility])    • Diabetes type 2, uncontrolled    • Fatty liver    • GERD (gastroesophageal reflux disease)    • Hemorrhoid    • Hyperlipidemia    • Hypertension    • Rectal bleeding    • Wears glasses    • Wears partial dentures     upper     Past Surgical History:   Procedure Laterality Date   • APPENDECTOMY       Social History   Social History     Substance and Sexual Activity   Alcohol Use Yes   • Alcohol/week: 2.0 standard drinks of alcohol   • Types: 1 Cans of beer, 1 Standard drinks or equivalent per week    Comment: Sometimes I go 2 or 3 months without drinking any alcohol.      Social History     Substance and Sexual Activity   Drug Use Yes   • Types: Marijuana    Comment: rarely     Social History     Tobacco Use   Smoking Status Former   • Packs/day: 1.00   • Years: 35.00   • Total pack years: 35.00   • Types: Cigarettes   • Start date: 3/10/1983   • Quit date: 10/3/2018   • Years since quittin.9   Smokeless Tobacco Never     Family History:   Family History   Problem Relation Age of Onset   • Diabetes type II Mother    • Hypertension Mother    • Hypertension Father    • Skin cancer Father    • Diabetes type II Sister        Meds/Allergies   Current Outpatient Medications   Medication Sig Dispense Refill   • cholecalciferol (VITAMIN D3) 1,000 units tablet Take 1,000 Units by mouth daily     • ALPRAZolam ER (XANAX XR) 0.5 MG 24 hr tablet if needed     • Ronald Microlet Lancets lancets Use to test blood sugar twice a day 200 each 3   • BD Pen Needle Melany U/F 32G X 4 MM MISC USE TO ADMINISTER INSULIN  ONCE DAILY 90 each 1   • bisacodyl (DULCOLAX) 5 mg EC tablet Take 2 tablets (10 mg total) by mouth once for 1 dose 2 tablet 0   • Blood Glucose Monitoring Suppl (Contour Next One) KIT Use 2 (two) times a day 1 kit 0   • CINNAMON PO Take by mouth 2 (two) times a day Sugar-guard  Last dose 23 (Patient not taking: Reported on 2023)     • famotidine (PEPCID) 20 mg tablet Take 20 mg by mouth as needed      • fexofenadine (ALLEGRA) 180 MG tablet Take 180 mg by mouth every morning     • FIBER ADULT GUMMIES PO Take by mouth in the morning     • glucose 4 g chewable tablet Chew 4 tablets (16 g total) as needed for low blood sugar 100 tablet 1   • glucose blood (Contour Next Test) test strip Use to test blood sugar twice a aday 200 each 3   • glucose blood (Contour Next Test) test strip Use as instructed; Check blood sugars twice a day 200 each 3   • hydrocortisone (ANUSOL-HC) 25 mg suppository Insert 1 suppository (25 mg total) into the rectum 2 (two) times a day as needed for hemorrhoids for up to 6 days 12 suppository 1   • Insulin Glargine Solostar (Lantus SoloStar) 100 UNIT/ML SOPN Inject 25 units at night 30 mL 3   • lisinopril-hydrochlorothiazide (PRINZIDE,ZESTORETIC) 10-12.5 MG per tablet Take 1 tablet by mouth every morning     • loratadine (CLARITIN) 10 mg tablet Take 10 mg by mouth if needed     • metFORMIN (GLUCOPHAGE-XR) 500 mg 24 hr tablet Take 2 tablets (1,000 mg total) by mouth 2 (two) times a day with meals 360 tablet 3   • omeprazole (PriLOSEC) 20 mg delayed release capsule As needed     • ondansetron (ZOFRAN) 4 mg tablet As needed     • polyethylene glycol (GOLYTELY) 4000 mL solution Take 4,000 mL by mouth once for 1 dose 4000 mL 0   • rosuvastatin (CRESTOR) 10 MG tablet Take 10 mg by mouth 3 (three) times a week M-W-F     • semaglutide, 2 mg/dose, (Ozempic, 2 MG/DOSE,) 8 mg/ mL injection pen Inject 0.75 mL (2 mg total) under the skin every 7 days 9 mL 3   • semaglutide, 2 mg/dose, (Ozempic, 2 MG/DOSE,) 8 mg/ mL injection pen Inject 2 mg under the skin once a week 9 mL 1     No current facility-administered medications for this visit. Allergies   Allergen Reactions   • Bydureon [Exenatide] Rash              Objective   Vitals: Blood pressure 140/90, pulse 70, height 5' 11" (1.803 m), weight 90.7 kg (200 lb), SpO2 98 %.     Physical Exam  Constitutional:       General: He is not in acute distress. Appearance: He is well-developed. He is not diaphoretic. HENT:      Head: Normocephalic and atraumatic. Eyes:      Conjunctiva/sclera: Conjunctivae normal.      Pupils: Pupils are equal, round, and reactive to light. Cardiovascular:      Rate and Rhythm: Normal rate and regular rhythm. Pulses:           Dorsalis pedis pulses are 2+ on the right side and 2+ on the left side. Heart sounds: Normal heart sounds. No murmur heard. Pulmonary:      Effort: Pulmonary effort is normal. No respiratory distress. Breath sounds: Normal breath sounds. No wheezing. Abdominal:      General: There is no distension. Palpations: Abdomen is soft. Tenderness: There is no abdominal tenderness. There is no guarding. Musculoskeletal:      Cervical back: Normal range of motion and neck supple. Feet:      Right foot:      Skin integrity: No ulcer, skin breakdown, erythema, warmth, callus or dry skin. Left foot:      Skin integrity: No ulcer, skin breakdown, erythema, warmth, callus or dry skin. Skin:     General: Skin is warm and dry. Findings: No erythema or rash. Neurological:      Mental Status: He is alert and oriented to person, place, and time. Psychiatric:         Behavior: Behavior normal.         Thought Content: Thought content normal.       Diabetic Foot Exam    Patient's shoes and socks removed. Right Foot/Ankle   Right Foot Inspection  Skin Exam: skin normal and skin intact. No dry skin, no warmth, no callus, no erythema, no maceration, no abnormal color, no pre-ulcer, no ulcer and no callus. Sensory   Vibration: intact  Proprioception: intact  Monofilament testing: diminished    Vascular  Capillary refills: < 3 seconds  The right DP pulse is 2+. Left Foot/Ankle  Left Foot Inspection  Skin Exam: skin normal and skin intact.  No dry skin, no warmth, no erythema, no maceration, normal color, no pre-ulcer, no ulcer and no callus. Sensory   Vibration: intact  Proprioception: intact  Monofilament testing: intact    Vascular  Capillary refills: < 3 seconds  The left DP pulse is 2+. Assign Risk Category  Risk: 1  The history was obtained from the review of the chart, patient. Lab Results:   Lab Results   Component Value Date/Time    Hemoglobin A1C 8.9 (H) 09/18/2023 09:56 AM    Hemoglobin A1C 9.2 (H) 06/05/2023 10:44 AM    Hemoglobin A1C 8.0 (A) 02/16/2023 10:08 AM    WBC 5.64 03/28/2023 10:45 AM    Hemoglobin 15.4 03/28/2023 10:45 AM    Hematocrit 44.9 03/28/2023 10:45 AM    MCV 91 03/28/2023 10:45 AM    Platelets 650 63/08/7813 10:45 AM    BUN 13 09/18/2023 09:56 AM    BUN 11 03/28/2023 10:45 AM    Potassium 4.0 09/18/2023 09:56 AM    Potassium 4.0 03/28/2023 10:45 AM    Chloride 103 09/18/2023 09:56 AM    Chloride 106 03/28/2023 10:45 AM    CO2 28 09/18/2023 09:56 AM    CO2 27 03/28/2023 10:45 AM    Creatinine 0.87 09/18/2023 09:56 AM    Creatinine 0.90 03/28/2023 10:45 AM    AST 35 09/18/2023 09:56 AM    ALT 51 09/18/2023 09:56 AM    Total Protein 6.9 09/18/2023 09:56 AM    Albumin 4.3 09/18/2023 09:56 AM    HDL, Direct 42 09/18/2023 09:56 AM    HDL, Direct 44 03/28/2023 10:45 AM    Triglycerides 132 09/18/2023 09:56 AM    Triglycerides 136 03/28/2023 10:45 AM         Imaging Studies: I have personally reviewed pertinent reports. Portions of the record may have been created with voice recognition software. Occasional wrong word or "sound a like" substitutions may have occurred due to the inherent limitations of voice recognition software. Read the chart carefully and recognize, using context, where substitutions have occurred.

## 2023-10-16 DIAGNOSIS — E78.2 MIXED HYPERLIPIDEMIA: ICD-10-CM

## 2023-10-16 DIAGNOSIS — E11.65 TYPE 2 DIABETES MELLITUS WITH HYPERGLYCEMIA, WITHOUT LONG-TERM CURRENT USE OF INSULIN (HCC): ICD-10-CM

## 2023-10-16 DIAGNOSIS — I10 HYPERTENSION GOAL BP (BLOOD PRESSURE) < 140/90: ICD-10-CM

## 2023-10-16 RX ORDER — PEN NEEDLE, DIABETIC 32GX 5/32"
NEEDLE, DISPOSABLE MISCELLANEOUS
Qty: 90 EACH | Refills: 1 | Status: SHIPPED | OUTPATIENT
Start: 2023-10-16

## 2023-10-18 DIAGNOSIS — E11.65 TYPE 2 DIABETES MELLITUS WITH HYPERGLYCEMIA, WITHOUT LONG-TERM CURRENT USE OF INSULIN (HCC): ICD-10-CM

## 2023-10-18 RX ORDER — PERPHENAZINE 16 MG/1
TABLET, FILM COATED ORAL
Qty: 200 EACH | Refills: 3 | Status: SHIPPED | OUTPATIENT
Start: 2023-10-18

## 2023-11-02 DIAGNOSIS — E78.2 MIXED HYPERLIPIDEMIA: ICD-10-CM

## 2023-11-02 DIAGNOSIS — I10 PRIMARY HYPERTENSION: ICD-10-CM

## 2023-11-02 DIAGNOSIS — I10 ESSENTIAL HYPERTENSION: ICD-10-CM

## 2023-11-02 DIAGNOSIS — E66.3 OVERWEIGHT (BMI 25.0-29.9): ICD-10-CM

## 2023-11-02 DIAGNOSIS — E11.65 TYPE 2 DIABETES MELLITUS WITH HYPERGLYCEMIA, WITH LONG-TERM CURRENT USE OF INSULIN (HCC): ICD-10-CM

## 2023-11-02 DIAGNOSIS — Z79.4 TYPE 2 DIABETES MELLITUS WITH HYPERGLYCEMIA, WITH LONG-TERM CURRENT USE OF INSULIN (HCC): ICD-10-CM

## 2023-11-02 RX ORDER — TIRZEPATIDE 2.5 MG/.5ML
INJECTION, SOLUTION SUBCUTANEOUS
Qty: 4 ML | Refills: 5 | Status: SHIPPED | OUTPATIENT
Start: 2023-11-02

## 2023-11-02 NOTE — TELEPHONE ENCOUNTER
Requested medication(s) are due for refill today: Yes  Patient has already received a courtesy refill: No  Other reason request has been forwarded to provider: protocol fail ,also sched appt. With you next mth.

## 2023-11-10 ENCOUNTER — TELEPHONE (OUTPATIENT)
Dept: ENDOCRINOLOGY | Facility: CLINIC | Age: 58
End: 2023-11-10

## 2023-11-10 DIAGNOSIS — I10 PRIMARY HYPERTENSION: ICD-10-CM

## 2023-11-10 DIAGNOSIS — E66.3 OVERWEIGHT (BMI 25.0-29.9): ICD-10-CM

## 2023-11-10 DIAGNOSIS — E78.2 MIXED HYPERLIPIDEMIA: ICD-10-CM

## 2023-11-10 DIAGNOSIS — I10 ESSENTIAL HYPERTENSION: ICD-10-CM

## 2023-11-10 DIAGNOSIS — Z79.4 TYPE 2 DIABETES MELLITUS WITH HYPERGLYCEMIA, WITH LONG-TERM CURRENT USE OF INSULIN (HCC): ICD-10-CM

## 2023-11-10 DIAGNOSIS — E11.65 TYPE 2 DIABETES MELLITUS WITH HYPERGLYCEMIA, WITH LONG-TERM CURRENT USE OF INSULIN (HCC): ICD-10-CM

## 2023-11-10 NOTE — TELEPHONE ENCOUNTER
Patient is good with [facility] and received a two month supply to try. Requesting a 3 mo due to no issues meir Yang. Please send new refill to [facility].

## 2024-01-30 DIAGNOSIS — I10 ESSENTIAL HYPERTENSION: ICD-10-CM

## 2024-01-30 DIAGNOSIS — E78.2 MIXED HYPERLIPIDEMIA: ICD-10-CM

## 2024-01-30 DIAGNOSIS — Z79.4 TYPE 2 DIABETES MELLITUS WITH HYPERGLYCEMIA, WITH LONG-TERM CURRENT USE OF INSULIN (HCC): ICD-10-CM

## 2024-01-30 DIAGNOSIS — E11.65 TYPE 2 DIABETES MELLITUS WITH HYPERGLYCEMIA, WITH LONG-TERM CURRENT USE OF INSULIN (HCC): ICD-10-CM

## 2024-01-30 DIAGNOSIS — E66.3 OVERWEIGHT (BMI 25.0-29.9): ICD-10-CM

## 2024-01-30 DIAGNOSIS — I10 PRIMARY HYPERTENSION: ICD-10-CM

## 2024-03-06 ENCOUNTER — TRANSCRIBE ORDERS (OUTPATIENT)
Dept: LAB | Facility: CLINIC | Age: 59
End: 2024-03-06

## 2024-03-06 ENCOUNTER — APPOINTMENT (OUTPATIENT)
Dept: LAB | Facility: CLINIC | Age: 59
End: 2024-03-06
Payer: COMMERCIAL

## 2024-03-06 DIAGNOSIS — E78.5 HYPERLIPOPROTEINEMIA: ICD-10-CM

## 2024-03-06 DIAGNOSIS — E11.40 DIABETIC NEUROPATHY WITH NEUROLOGIC COMPLICATION (HCC): Primary | ICD-10-CM

## 2024-03-06 DIAGNOSIS — E78.2 MIXED HYPERLIPIDEMIA: ICD-10-CM

## 2024-03-06 DIAGNOSIS — Z79.4 TYPE 2 DIABETES MELLITUS WITH HYPERGLYCEMIA, WITH LONG-TERM CURRENT USE OF INSULIN (HCC): ICD-10-CM

## 2024-03-06 DIAGNOSIS — I10 ESSENTIAL HYPERTENSION: ICD-10-CM

## 2024-03-06 DIAGNOSIS — E11.65 TYPE 2 DIABETES MELLITUS WITH HYPERGLYCEMIA, WITH LONG-TERM CURRENT USE OF INSULIN (HCC): ICD-10-CM

## 2024-03-06 DIAGNOSIS — E11.49 DIABETIC NEUROPATHY WITH NEUROLOGIC COMPLICATION (HCC): Primary | ICD-10-CM

## 2024-03-06 DIAGNOSIS — K21.9 GASTROESOPHAGEAL REFLUX DISEASE WITHOUT ESOPHAGITIS: ICD-10-CM

## 2024-03-06 DIAGNOSIS — I10 PRIMARY HYPERTENSION: ICD-10-CM

## 2024-03-06 DIAGNOSIS — E66.3 OVERWEIGHT (BMI 25.0-29.9): ICD-10-CM

## 2024-03-06 LAB
ALBUMIN SERPL BCP-MCNC: 4.3 G/DL (ref 3.5–5)
ALP SERPL-CCNC: 70 U/L (ref 34–104)
ALT SERPL W P-5'-P-CCNC: 33 U/L (ref 7–52)
ANION GAP SERPL CALCULATED.3IONS-SCNC: 11 MMOL/L
AST SERPL W P-5'-P-CCNC: 26 U/L (ref 13–39)
BASOPHILS # BLD AUTO: 0.05 THOUSANDS/ÂΜL (ref 0–0.1)
BASOPHILS NFR BLD AUTO: 1 % (ref 0–1)
BILIRUB SERPL-MCNC: 1.4 MG/DL (ref 0.2–1)
BUN SERPL-MCNC: 15 MG/DL (ref 5–25)
CALCIUM SERPL-MCNC: 8.8 MG/DL (ref 8.4–10.2)
CHLORIDE SERPL-SCNC: 99 MMOL/L (ref 96–108)
CHOLEST SERPL-MCNC: 161 MG/DL
CO2 SERPL-SCNC: 26 MMOL/L (ref 21–32)
CREAT SERPL-MCNC: 0.81 MG/DL (ref 0.6–1.3)
EOSINOPHIL # BLD AUTO: 0.06 THOUSAND/ÂΜL (ref 0–0.61)
EOSINOPHIL NFR BLD AUTO: 1 % (ref 0–6)
ERYTHROCYTE [DISTWIDTH] IN BLOOD BY AUTOMATED COUNT: 12.3 % (ref 11.6–15.1)
GFR SERPL CREATININE-BSD FRML MDRD: 98 ML/MIN/1.73SQ M
GLUCOSE P FAST SERPL-MCNC: 251 MG/DL (ref 65–99)
HCT VFR BLD AUTO: 45.3 % (ref 36.5–49.3)
HDLC SERPL-MCNC: 43 MG/DL
HGB BLD-MCNC: 15.6 G/DL (ref 12–17)
IMM GRANULOCYTES # BLD AUTO: 0.02 THOUSAND/UL (ref 0–0.2)
IMM GRANULOCYTES NFR BLD AUTO: 0 % (ref 0–2)
LDLC SERPL CALC-MCNC: 82 MG/DL (ref 0–100)
LYMPHOCYTES # BLD AUTO: 1.09 THOUSANDS/ÂΜL (ref 0.6–4.47)
LYMPHOCYTES NFR BLD AUTO: 18 % (ref 14–44)
MCH RBC QN AUTO: 31.3 PG (ref 26.8–34.3)
MCHC RBC AUTO-ENTMCNC: 34.4 G/DL (ref 31.4–37.4)
MCV RBC AUTO: 91 FL (ref 82–98)
MONOCYTES # BLD AUTO: 0.53 THOUSAND/ÂΜL (ref 0.17–1.22)
MONOCYTES NFR BLD AUTO: 9 % (ref 4–12)
NEUTROPHILS # BLD AUTO: 4.18 THOUSANDS/ÂΜL (ref 1.85–7.62)
NEUTS SEG NFR BLD AUTO: 71 % (ref 43–75)
NRBC BLD AUTO-RTO: 0 /100 WBCS
PLATELET # BLD AUTO: 218 THOUSANDS/UL (ref 149–390)
PMV BLD AUTO: 11.2 FL (ref 8.9–12.7)
POTASSIUM SERPL-SCNC: 4.4 MMOL/L (ref 3.5–5.3)
PROT SERPL-MCNC: 6.7 G/DL (ref 6.4–8.4)
PSA SERPL-MCNC: 2.4 NG/ML (ref 0–4)
RBC # BLD AUTO: 4.99 MILLION/UL (ref 3.88–5.62)
SODIUM SERPL-SCNC: 136 MMOL/L (ref 135–147)
TRIGL SERPL-MCNC: 179 MG/DL
WBC # BLD AUTO: 5.93 THOUSAND/UL (ref 4.31–10.16)

## 2024-03-06 PROCEDURE — 83036 HEMOGLOBIN GLYCOSYLATED A1C: CPT

## 2024-03-06 PROCEDURE — 85025 COMPLETE CBC W/AUTO DIFF WBC: CPT

## 2024-03-06 PROCEDURE — 80053 COMPREHEN METABOLIC PANEL: CPT

## 2024-03-06 PROCEDURE — G0103 PSA SCREENING: HCPCS

## 2024-03-06 PROCEDURE — 36415 COLL VENOUS BLD VENIPUNCTURE: CPT

## 2024-03-06 PROCEDURE — 80061 LIPID PANEL: CPT

## 2024-03-07 LAB
EST. AVERAGE GLUCOSE BLD GHB EST-MCNC: 298 MG/DL
HBA1C MFR BLD: 12 %

## 2024-03-08 ENCOUNTER — TELEPHONE (OUTPATIENT)
Dept: ENDOCRINOLOGY | Facility: CLINIC | Age: 59
End: 2024-03-08

## 2024-03-11 ENCOUNTER — TELEPHONE (OUTPATIENT)
Dept: ENDOCRINOLOGY | Facility: CLINIC | Age: 59
End: 2024-03-11

## 2024-03-26 DIAGNOSIS — Z79.4 TYPE 2 DIABETES MELLITUS WITH HYPERGLYCEMIA, WITH LONG-TERM CURRENT USE OF INSULIN (HCC): ICD-10-CM

## 2024-03-26 DIAGNOSIS — E11.65 TYPE 2 DIABETES MELLITUS WITH HYPERGLYCEMIA, WITH LONG-TERM CURRENT USE OF INSULIN (HCC): ICD-10-CM

## 2024-03-27 RX ORDER — INSULIN GLARGINE 100 [IU]/ML
INJECTION, SOLUTION SUBCUTANEOUS
Qty: 30 ML | Refills: 3 | Status: SHIPPED | OUTPATIENT
Start: 2024-03-27

## 2024-05-01 ENCOUNTER — TELEPHONE (OUTPATIENT)
Age: 59
End: 2024-05-01

## 2024-05-01 NOTE — TELEPHONE ENCOUNTER
Bismark Everett,     Patient needs a new script to be sent to his pharmacy.    tirzepatide (Mounjaro) 2.5 MG/0.5ML     Pharmacy:  [id number] - [address] [address]  Phone: [phone]  Fax: [phone]  KETTY #: --     CB: [phone]

## 2024-05-02 DIAGNOSIS — Z79.4 TYPE 2 DIABETES MELLITUS WITH HYPERGLYCEMIA, WITH LONG-TERM CURRENT USE OF INSULIN (HCC): Primary | ICD-10-CM

## 2024-05-02 DIAGNOSIS — E11.65 TYPE 2 DIABETES MELLITUS WITH HYPERGLYCEMIA, WITH LONG-TERM CURRENT USE OF INSULIN (HCC): Primary | ICD-10-CM

## 2024-05-06 DIAGNOSIS — E11.65 TYPE 2 DIABETES MELLITUS WITH HYPERGLYCEMIA, WITHOUT LONG-TERM CURRENT USE OF INSULIN (HCC): ICD-10-CM

## 2024-05-06 DIAGNOSIS — E11.65 TYPE 2 DIABETES MELLITUS WITH HYPERGLYCEMIA, WITH LONG-TERM CURRENT USE OF INSULIN (HCC): ICD-10-CM

## 2024-05-06 DIAGNOSIS — Z79.4 TYPE 2 DIABETES MELLITUS WITH HYPERGLYCEMIA, WITH LONG-TERM CURRENT USE OF INSULIN (HCC): ICD-10-CM

## 2024-05-06 DIAGNOSIS — E78.2 MIXED HYPERLIPIDEMIA: ICD-10-CM

## 2024-05-06 DIAGNOSIS — I10 HYPERTENSION GOAL BP (BLOOD PRESSURE) < 140/90: ICD-10-CM

## 2024-05-06 NOTE — TELEPHONE ENCOUNTER
"Phone call from patient last script filled for Mounjaro was ordered for 30days - patient states \"that he normally gets a 90day supply\". Please send to [facility]  "

## 2024-05-07 ENCOUNTER — OFFICE VISIT (OUTPATIENT)
Dept: ENDOCRINOLOGY | Facility: CLINIC | Age: 59
End: 2024-05-07
Payer: COMMERCIAL

## 2024-05-07 ENCOUNTER — TELEPHONE (OUTPATIENT)
Age: 59
End: 2024-05-07

## 2024-05-07 VITALS
DIASTOLIC BLOOD PRESSURE: 72 MMHG | HEART RATE: 77 BPM | BODY MASS INDEX: 28.73 KG/M2 | HEIGHT: 71 IN | SYSTOLIC BLOOD PRESSURE: 130 MMHG | WEIGHT: 205.2 LBS

## 2024-05-07 DIAGNOSIS — I10 ESSENTIAL HYPERTENSION: Primary | ICD-10-CM

## 2024-05-07 DIAGNOSIS — Z79.4 TYPE 2 DIABETES MELLITUS WITH HYPERGLYCEMIA, WITH LONG-TERM CURRENT USE OF INSULIN (HCC): ICD-10-CM

## 2024-05-07 DIAGNOSIS — E78.5 HYPERLIPIDEMIA, UNSPECIFIED HYPERLIPIDEMIA TYPE: ICD-10-CM

## 2024-05-07 DIAGNOSIS — E11.65 TYPE 2 DIABETES MELLITUS WITH HYPERGLYCEMIA, WITH LONG-TERM CURRENT USE OF INSULIN (HCC): ICD-10-CM

## 2024-05-07 PROBLEM — E83.52 HYPERCALCEMIA: Status: RESOLVED | Noted: 2021-12-22 | Resolved: 2024-05-07

## 2024-05-07 PROCEDURE — 99214 OFFICE O/P EST MOD 30 MIN: CPT | Performed by: NURSE PRACTITIONER

## 2024-05-07 RX ORDER — PEN NEEDLE, DIABETIC 32GX 5/32"
NEEDLE, DISPOSABLE MISCELLANEOUS
Qty: 90 EACH | Refills: 1 | Status: SHIPPED | OUTPATIENT
Start: 2024-05-07

## 2024-05-07 RX ORDER — INSULIN GLARGINE 100 [IU]/ML
30 INJECTION, SOLUTION SUBCUTANEOUS
Qty: 15 ML | Refills: 2 | Status: SHIPPED | OUTPATIENT
Start: 2024-05-07

## 2024-05-07 RX ORDER — METHOCARBAMOL 500 MG/1
500 TABLET, FILM COATED ORAL DAILY PRN
COMMUNITY
Start: 2024-04-09

## 2024-05-07 NOTE — PROGRESS NOTES
Established Patient Progress Note    Chief Complaint:  Diabetes follow up visit    Impression & Plan:    Problem List Items Addressed This Visit          Cardiovascular and Mediastinum    Essential hypertension - Primary     BP stable on current regimen.   Continues on ACE-I.             Endocrine    Type 2 diabetes mellitus with hyperglycemia, with long-term current use of insulin (HCC)       Lab Results   Component Value Date    HGBA1C 12.0 (H) 03/06/2024     HGA1C close to goal.    BGL Reviewed: none for review    Treatment regimen: Recommend increase Mounjaro 5 mg weekly (for now Lantus 30 units daily) Continue metformin 500 mg every other day as tolerated.     Discussed GLP-1 RA options.  Denies personal history of pancreatitis, family/personal history of thyroid c-cell tumors including medullary thyroid cancer, or family/personal history of multiple endocrine neoplasias. Reviewed mechanism of action. Patient is aware of possible side effects which include nausea, vomiting, diarrhea, constipation, bloating, upset stomach. The patient is aware that blood sugar logs/CGM download must be submitted for review before dose adjustment and reporting any side effects of the medication which may include appetite suppression, constipation, diarrhea, nausea, abdominal pain, fatigue/malaise. Counseled the patient on the importance of maintaining a healthy diet in addition to regular physical activity while on this medication.      Discussed risks/complications associated with uncontrolled diabetes including organ involvement, heart attack, stroke, death.  Recommended referral to diabetes education.      Advised lifestyle modifications including attention to diet including the amount and types of carbohydrates consumed and regular activity.     Call for blood sugars less than 70 mg/dl or patterns over mg/dl.     Monitor blood glucose levels at least 2 times a day, if on insulin ideally before all insulin administration  times.     Discussed use of CGM to collect additional blood glucose data to reveal patterns that can be used to improve glucose levels and adjust insulin regimen.    Discussed symptoms and treatment of hypoglycemia.  Reviewed risks associated with hypoglycemia. Always carry rapid acting carbohydrates and a glucometer (a way to check your blood sugar).    Recommendation for medical identification either bracelet, necklace.    Recommendation for glucagon if on insulin.     Routine follow up for diabetic eye and foot exams.     Ordered blood work to complete prior to next visit.    Send glucose logs/CGM download in 1-2 weeks for review    Follow up in 3 months.            Relevant Medications    tirzepatide (Mounjaro) 5 MG/0.5ML    Insulin Glargine Solostar (Lantus SoloStar) 100 UNIT/ML SOPN    Other Relevant Orders    Lipid panel    Hemoglobin A1C    Comprehensive metabolic panel    Albumin / creatinine urine ratio    TSH, 3rd generation with Free T4 reflex       Other    Hyperlipidemia     Continues on statin, LDL near goal. Discussed lifestyle modifications and improvement in glycemic control.  Recommend rechecking lipid panel in 3 months.             History of Present Illness:   Serafin Pineda is a 58 y.o. male with a history of type 2 diabetes mellitus. Reports last podiatry appointment November 2023, has neuropathy. Last office visit in September 2023 seen by Dr. Everett.     Home blood glucose readings: 200-380 mg/dL, per patient report with no glucose logs for review.     Current regimen:  Lantus 26 units daily  Mounjaro 2.5 mg weekly  Metformin  mg every other day    Has hypertension: Taking lisinopril, tolerating  Has hyperlipidemia: Taking rosuvastatin 10 mg four times per week    Thyroid disorders: None  Pancreatitis: None    Patient Active Problem List   Diagnosis    Type 2 diabetes mellitus with hyperglycemia, with long-term current use of insulin (HCC)    Essential hypertension    Hyperlipidemia     Rectal pain    BRBPR (bright red blood per rectum)    Gastroesophageal reflux disease without esophagitis    Hemorrhoids    Overweight (BMI 25.0-29.9)      Past Medical History:   Diagnosis Date    Anesthesia complication     difficulty waking up    Annual physical exam 2020    Diabetes mellitus (HCC)     Diabetes type 2, uncontrolled     Fatty liver     GERD (gastroesophageal reflux disease)     Hemorrhoid     Hyperlipidemia     Hypertension     Rectal bleeding     Wears glasses     Wears partial dentures     upper      Past Surgical History:   Procedure Laterality Date    APPENDECTOMY        Family History   Problem Relation Age of Onset    Diabetes type II Mother     Hypertension Mother     Hypertension Father     Skin cancer Father     Diabetes type II Sister      Social History     Tobacco Use    Smoking status: Former     Current packs/day: 0.00     Average packs/day: 1 pack/day for 35.6 years (35.6 ttl pk-yrs)     Types: Cigarettes     Start date: 3/10/1983     Quit date: 10/3/2018     Years since quittin.5    Smokeless tobacco: Never   Substance Use Topics    Alcohol use: Yes     Alcohol/week: 2.0 standard drinks of alcohol     Types: 1 Cans of beer, 1 Standard drinks or equivalent per week     Comment: Sometimes I go 2 or 3 months without drinking any alcohol.     Allergies   Allergen Reactions    Bydureon [Exenatide] Rash                Current Outpatient Medications:     ALPRAZolam ER (XANAX XR) 0.5 MG 24 hr tablet, if needed, Disp: , Rfl:     Ronald Microlet Lancets lancets, Use to test blood sugar twice a day, Disp: 200 each, Rfl: 3    Blood Glucose Monitoring Suppl (Contour Next One) KIT, Use 2 (two) times a day, Disp: 1 kit, Rfl: 0    cholecalciferol (VITAMIN D3) 1,000 units tablet, Take 1,000 Units by mouth daily, Disp: , Rfl:     famotidine (PEPCID) 20 mg tablet, Take 20 mg by mouth as needed , Disp: , Rfl:     fexofenadine (ALLEGRA) 180 MG tablet, Take 180 mg by mouth daily at bedtime,  Disp: , Rfl:     FIBER ADULT GUMMIES PO, Take by mouth in the morning, Disp: , Rfl:     glucose 4 g chewable tablet, Chew 4 tablets (16 g total) as needed for low blood sugar, Disp: 100 tablet, Rfl: 1    glucose blood (Contour Next Test) test strip, Use as instructed; Check blood sugars twice a day, Disp: 200 each, Rfl: 3    glucose blood (Contour Next Test) test strip, Use to test blood sugar twice a aday, Disp: 200 each, Rfl: 3    Insulin Glargine Solostar (Lantus SoloStar) 100 UNIT/ML SOPN, Inject 0.3 mL (30 Units total) under the skin daily at bedtime INJECT SUBCUTANEOUSLY 26 UNITS  AT BEDTIME, Disp: 15 mL, Rfl: 2    lisinopril-hydrochlorothiazide (PRINZIDE,ZESTORETIC) 10-12.5 MG per tablet, Take 1 tablet by mouth every morning, Disp: , Rfl:     metFORMIN (GLUCOPHAGE-XR) 500 mg 24 hr tablet, Take 2 tablets (1,000 mg total) by mouth 2 (two) times a day with meals (Patient taking differently: Take 1,000 mg by mouth every other day), Disp: 360 tablet, Rfl: 3    methocarbamol (ROBAXIN) 500 mg tablet, Take 500 mg by mouth daily as needed for muscle spasms, Disp: , Rfl:     omeprazole (PriLOSEC) 20 mg delayed release capsule, As needed, Disp: , Rfl:     ondansetron (ZOFRAN) 4 mg tablet, As needed, Disp: , Rfl:     rosuvastatin (CRESTOR) 10 MG tablet, Take 10 mg by mouth 4 (four) times a week M-W-F-S, Disp: , Rfl:     tirzepatide (Mounjaro) 5 MG/0.5ML, Inject 0.5 mL (5 mg total) under the skin every 7 days, Disp: 6 mL, Rfl: 2    BD Pen Needle Melany 2nd Gen 32G X 4 MM MISC, USE TO ADMINISTER INSULIN ONCE  DAILY, Disp: 90 each, Rfl: 1    bisacodyl (DULCOLAX) 5 mg EC tablet, Take 2 tablets (10 mg total) by mouth once for 1 dose (Patient not taking: Reported on 5/7/2024), Disp: 2 tablet, Rfl: 0    CINNAMON PO, Take by mouth 2 (two) times a day Sugar-guard Last dose 6/5/23 (Patient not taking: Reported on 5/7/2024), Disp: , Rfl:     hydrocortisone (ANUSOL-HC) 25 mg suppository, Insert 1 suppository (25 mg total) into the  "rectum 2 (two) times a day as needed for hemorrhoids for up to 6 days (Patient not taking: Reported on 5/7/2024), Disp: 12 suppository, Rfl: 1    loratadine (CLARITIN) 10 mg tablet, Take 10 mg by mouth if needed (Patient not taking: Reported on 5/7/2024), Disp: , Rfl:     polyethylene glycol (GOLYTELY) 4000 mL solution, Take 4,000 mL by mouth once for 1 dose (Patient not taking: Reported on 5/7/2024), Disp: 4000 mL, Rfl: 0    Review of Systems  Constitutional: Negative for activity change, appetite change, fatigue and unexpected weight change.   HENT: Negative for ear pain, sore throat  Eyes: Negative for visual disturbance.   Respiratory: Negative for cough and shortness of breath.    Cardiovascular: Negative for chest pain and palpitations.   Gastrointestinal: Negative for abdominal distention, abdominal pain, constipation, diarrhea and vomiting.   Endocrine: Negative for  polydipsia and polyuria.   Musculoskeletal: Negative for arthralgias and back pain.   Skin: Negative for color change and rash.   Neurological: Negative for weakness or tremors.   All other systems reviewed and are negative.    Physical Exam:  Body mass index is 28.62 kg/m².  /72 (BP Location: Left arm, Patient Position: Sitting, Cuff Size: Large)   Pulse 77   Ht 5' 11\" (1.803 m)   Wt 93.1 kg (205 lb 3.2 oz)   BMI 28.62 kg/m²    Wt Readings from Last 3 Encounters:   05/07/24 93.1 kg (205 lb 3.2 oz)   09/22/23 90.7 kg (200 lb)   06/16/23 92.1 kg (203 lb)       Physical Exam   Constitutional: He is oriented to person, place, and time. He appears well-developed and well-nourished. No distress.   HENT:   Head: Normocephalic and atraumatic.   Neck: Normal range of motion.   Pulmonary/Chest: Effort normal.   Musculoskeletal: Normal range of motion.   Neurological: He is alert and oriented to person, place, and time.   Skin: He is not diaphoretic.   Psychiatric: He has a normal mood and affect. His behavior is normal.       Physical " "Exam  Cardiovascular:      Pulses: no weak pulses.           Dorsalis pedis pulses are 2+ on the right side and 2+ on the left side.   Feet:      Right foot:      Skin integrity: No ulcer, skin breakdown, erythema, warmth, callus or dry skin.      Left foot:      Skin integrity: No ulcer, skin breakdown, erythema, warmth, callus or dry skin.         Patient's shoes and socks removed.    Right Foot/Ankle   Right Foot Inspection  Skin Exam: skin normal. Skin not intact, no dry skin, no warmth, no callus, no erythema, no maceration, no abnormal color, no pre-ulcer, no ulcer and no callus.     Toe Exam: ROM and strength within normal limits.     Sensory   Vibration: diminished  Monofilament testing: diminished    Vascular  Capillary refills: < 3 seconds  The right DP pulse is 2+.     Left Foot/Ankle  Left Foot Inspection  Skin Exam: skin normal. Skin not intact, no dry skin, no warmth, no erythema, no maceration, normal color, no pre-ulcer, no ulcer and no callus.     Toe Exam: ROM and strength within normal limits.     Sensory   Vibration: diminished  Monofilament testing: diminished    Vascular  Capillary refills: < 3 seconds  The left DP pulse is 2+.     Assign Risk Category  No deformity present  Loss of protective sensation  No weak pulses  Risk: 1      Labs:   Lab Results   Component Value Date    HGBA1C 12.0 (H) 03/06/2024    HGBA1C 8.9 (H) 09/18/2023    HGBA1C 9.2 (H) 06/05/2023     Lab Results   Component Value Date    CREATININE 0.81 03/06/2024    CREATININE 0.87 09/18/2023    CREATININE 0.90 03/28/2023    BUN 15 03/06/2024    K 4.4 03/06/2024    CL 99 03/06/2024    CO2 26 03/06/2024     eGFR   Date Value Ref Range Status   03/06/2024 98 ml/min/1.73sq m Final     Lab Results   Component Value Date    HDL 43 03/06/2024    TRIG 179 (H) 03/06/2024     Lab Results   Component Value Date    ALT 33 03/06/2024    AST 26 03/06/2024    ALKPHOS 70 03/06/2024     No results found for: \"[id number]\"  No results found " "for: \"FREET4\", \"TSI\"    Discussed with the patient and all questioned fully answered. He will call me if any problems arise.    Follow-up appointment in 3 months.     Counseled patient on diagnostic results, prognosis, risk and benefit of treatment options, instruction for management, importance of treatment compliance, Risk  factor reduction and impressions    DEMETRA Erazo    "

## 2024-05-07 NOTE — ASSESSMENT & PLAN NOTE
Lab Results   Component Value Date    HGBA1C 12.0 (H) 03/06/2024     HGA1C close to goal.    BGL Reviewed: none for review    Treatment regimen: Recommend increase Mounjaro 5 mg weekly (for now Lantus 30 units daily) Continue metformin 500 mg every other day as tolerated.     Discussed GLP-1 RA options.  Denies personal history of pancreatitis, family/personal history of thyroid c-cell tumors including medullary thyroid cancer, or family/personal history of multiple endocrine neoplasias. Reviewed mechanism of action. Patient is aware of possible side effects which include nausea, vomiting, diarrhea, constipation, bloating, upset stomach. The patient is aware that blood sugar logs/CGM download must be submitted for review before dose adjustment and reporting any side effects of the medication which may include appetite suppression, constipation, diarrhea, nausea, abdominal pain, fatigue/malaise. Counseled the patient on the importance of maintaining a healthy diet in addition to regular physical activity while on this medication.      Discussed risks/complications associated with uncontrolled diabetes including organ involvement, heart attack, stroke, death.  Recommended referral to diabetes education.      Advised lifestyle modifications including attention to diet including the amount and types of carbohydrates consumed and regular activity.     Call for blood sugars less than 70 mg/dl or patterns over mg/dl.     Monitor blood glucose levels at least 2 times a day, if on insulin ideally before all insulin administration times.     Discussed use of CGM to collect additional blood glucose data to reveal patterns that can be used to improve glucose levels and adjust insulin regimen.    Discussed symptoms and treatment of hypoglycemia.  Reviewed risks associated with hypoglycemia. Always carry rapid acting carbohydrates and a glucometer (a way to check your blood sugar).    Recommendation for medical identification  either bracelet, necklace.    Recommendation for glucagon if on insulin.     Routine follow up for diabetic eye and foot exams.     Ordered blood work to complete prior to next visit.    Send glucose logs/CGM download in 1-2 weeks for review    Follow up in 3 months.

## 2024-05-07 NOTE — PATIENT INSTRUCTIONS
The patient is aware that blood sugar logs/CGM download must be submitted for review before dose adjustment and reporting any side effects of the medication which may include appetite suppression, constipation, diarrhea, nausea, abdominal pain, fatigue/malaise. Counseled the patient on the importance of maintaining a healthy diet in addition to regular physical activity while on this medication.        Hypoglycemia in a Person with Diabetes   WHAT YOU NEED TO KNOW:   Hypoglycemia is a serious condition that happens when your blood glucose (sugar) level drops too low. The blood sugar level is usually too high in a person with diabetes, but the level can also drop too low. It is important to follow your diabetes management plan to keep your blood sugar level steady.  DISCHARGE INSTRUCTIONS:   Have someone call your local emergency number (911 in the [place]) if:   You have a seizure or pass out.    Your blood sugar is less than 50 mg/dL and does not respond to treatment.    You feel you are going to pass out.    You have trouble thinking clearly.    Call your doctor or diabetes care team provider if:   You have had symptoms of low blood sugar several times.    You have questions about the amount of insulin or diabetes medicine you are taking.    You have questions or concerns about your condition or care.    Medicines:   Insulin or diabetes medicine  help to keep your blood sugar under control.    Glucagon  may be needed if you have severe hypoglycemia.    Take your medicine as directed.  Contact your healthcare provider if you think your medicine is not helping or if you have side effects. Tell your provider if you are allergic to any medicine. Keep a list of the medicines, vitamins, and herbs you take. Include the amounts, and when and why you take them. Bring the list or the pill bottles to follow-up visits. Carry your medicine list with you in case of an emergency.    Manage hypoglycemia:   Check your blood sugar level  right away if you have symptoms of hypoglycemia.  Hypoglycemia usually happens when your blood sugar level is 70 mg/dL or below. Ask your diabetes care team provider what blood sugar level is too low for you.    If your blood sugar level is too low, eat or drink 15 grams of fast-acting carbohydrate.  Examples of this amount of fast-acting carbohydrate are 4 ounces (½ cup) of fruit juice or 4 ounces of regular soda. Other examples are 2 tablespoons of raisins or 1 tube of glucose gel.     Check your blood sugar level 15 minutes later. Sit still as you wait. If the level is still low (less than 100 mg/dL), have another 15 grams of carbohydrate. When the level returns to 100 mg/dL, eat a meal if it is time. If your meal time is more than 1 hour away, eat a snack. The snack should contain carbohydrates, such as the following:    3/4 cup of cereal    1 cup of skim or low fat milk    6 soda crackers    1/2 of a turkey sandwich    15 fat-free chips  This will help prevent another drop in blood sugar. Always carefully follow your provider's instructions on how to treat low blood sugar levels.  Always carry a source of fast-acting carbohydrate.  If you have symptoms of hypoglycemia and you do not have a blood glucose meter, have a source of fast-acting carbohydrate anyway. Avoid carbohydrate foods that are high in fat. The fat content may make the carbohydrate take longer to increase your blood sugar level. Ask your provider if you should carry a glucagon kit. Glucagon is a medicine that is injected when you develop severe hypoglycemia and become unconscious. Check the expiration date every month and replace it before it expires.    Teach others how to help you if you have symptoms of hypoglycemia.  Tell them about the symptoms of hypoglycemia. Ask them to give you a source of fast-acting carbohydrate if you cannot get it yourself. Ask them to give you a glucagon injection if you have signs of hypoglycemia and you become  unconscious or have a seizure. Ask them to call the local emergency number (911 in the [place]) . This is an emergency. Tell them never to try to make you swallow anything if you faint or have a seizure.    Wear medical alert jewelry  or carry a card that says you have diabetes. Ask where to get these items.       Prevent hypoglycemia:   Take diabetes medicine as directed.  Take your medicine at the right time and in the right amount. Do not  double the amount of medicine you take unless instructed by your diabetes care team provider. You may need oral diabetes medicine, insulin, or both to help control your blood sugar levels. Your healthcare provider will teach you how and when to take oral diabetes medicine. You will also be taught about side effects oral diabetes medicine can cause. Insulin may be added if oral diabetes medicine becomes less effective over time. Insulin may be injected, or given through a pump or pen. You and your care team will discuss which method is best for you.    An insulin pump  is an implanted device that gives your insulin 24 hours a day. An insulin pump prevents the need for multiple insulin injections in a day.         An insulin pen  is a device prefilled with the right amount of insulin.       Eat meals and snacks as directed.  Talk to your dietitian or provider about a meal plan that is right for you. Do not skip meals.         Check your blood sugar level as directed.  Ask your provider what your blood sugar levels should be before and after you eat. Ask when and how often to check your blood sugar level. You may need to check a drop of blood in a glucose test machine. You may need to check at least 3 times each day. Record your blood sugar level results and take the record with you when you see your care team. They may use it to make changes to your medicine, food, or exercise schedules. Your care team provider may recommend a continuous glucose monitor (CGM). A CGM is a device that  is worn at all times. The CGM checks your blood sugar every 5 minutes. It sends results to an electronic device such as a smart phone.            Check your blood sugar level before you exercise.  Physical activity, such as exercise, can decrease your blood sugar level. If your blood sugar level is less than 100 mg/dL, have a carbohydrate snack. Examples are 4 to 6 crackers, ½ banana, 8 ounces (1 cup) of nonfat or 1% milk, or 4 ounces (½ cup) of juice. If you will be active for more than 1 hour, you may need to check your blood sugar level every 30 minutes. Your provider may also recommend that you check your blood sugar level after your activity.    Know the risks if you choose to drink alcohol.  Alcohol can cause your blood sugar levels to be low if you use insulin. Alcohol can cause high blood sugar levels and weight gain if you drink too much. Women 21 years or older and men 65 years or older should limit alcohol to 1 drink a day. Men aged 21 to 64 years should limit alcohol to 2 drinks a day. A drink of alcohol is 12 ounces of beer, 5 ounces of wine, or 1½ ounces of liquor.    Follow up with your doctor or diabetes care team provider as directed:  You may need your insulin or diabetes medicine changed if you continue to have hypoglycemia episodes. Write down your questions so you remember to ask them during your visits.  © Copyright [employer] 2023 Information is for End User's use only and may not be sold, redistributed or otherwise used for commercial purposes.  The above information is an  only. It is not intended as medical advice for individual conditions or treatments. Talk to your doctor, nurse or pharmacist before following any medical regimen to see if it is safe and effective for you.

## 2024-05-07 NOTE — TELEPHONE ENCOUNTER
PA for Mounjaro 2.5    Submitted via    [x]CMM-KEY [id number]  [][employer]pl[employer]-Case ID #    []Faxed to plan   []Other website    []Phone call Case ID #      Office notes sent, clinical questions answered. Awaiting determination    Turnaround time for your insurance to make a decision on your Prior Authorization can take 7-21 business days.

## 2024-05-07 NOTE — ASSESSMENT & PLAN NOTE
Continues on statin, LDL near goal. Discussed lifestyle modifications and improvement in glycemic control.  Recommend rechecking lipid panel in 3 months.

## 2024-08-06 ENCOUNTER — APPOINTMENT (OUTPATIENT)
Dept: LAB | Facility: CLINIC | Age: 59
End: 2024-08-06
Payer: COMMERCIAL

## 2024-08-06 DIAGNOSIS — E11.65 TYPE 2 DIABETES MELLITUS WITH HYPERGLYCEMIA, WITH LONG-TERM CURRENT USE OF INSULIN (HCC): ICD-10-CM

## 2024-08-06 DIAGNOSIS — Z79.4 TYPE 2 DIABETES MELLITUS WITH HYPERGLYCEMIA, WITH LONG-TERM CURRENT USE OF INSULIN (HCC): ICD-10-CM

## 2024-08-06 LAB
CREAT UR-MCNC: 213.4 MG/DL
EST. AVERAGE GLUCOSE BLD GHB EST-MCNC: 258 MG/DL
HBA1C MFR BLD: 10.6 %
MICROALBUMIN UR-MCNC: 82.3 MG/L
MICROALBUMIN/CREAT 24H UR: 39 MG/G CREATININE (ref 0–30)
TSH SERPL DL<=0.05 MIU/L-ACNC: 1.13 UIU/ML (ref 0.45–4.5)

## 2024-08-06 PROCEDURE — 84443 ASSAY THYROID STIM HORMONE: CPT

## 2024-08-06 PROCEDURE — 83036 HEMOGLOBIN GLYCOSYLATED A1C: CPT

## 2024-08-28 ENCOUNTER — OFFICE VISIT (OUTPATIENT)
Dept: ENDOCRINOLOGY | Facility: CLINIC | Age: 59
End: 2024-08-28
Payer: COMMERCIAL

## 2024-08-28 VITALS
OXYGEN SATURATION: 96 % | SYSTOLIC BLOOD PRESSURE: 116 MMHG | HEIGHT: 71 IN | WEIGHT: 203 LBS | BODY MASS INDEX: 28.42 KG/M2 | DIASTOLIC BLOOD PRESSURE: 70 MMHG | HEART RATE: 88 BPM

## 2024-08-28 DIAGNOSIS — Z79.4 TYPE 2 DIABETES MELLITUS WITH HYPERGLYCEMIA, WITH LONG-TERM CURRENT USE OF INSULIN (HCC): ICD-10-CM

## 2024-08-28 DIAGNOSIS — E78.5 HYPERLIPIDEMIA, UNSPECIFIED HYPERLIPIDEMIA TYPE: ICD-10-CM

## 2024-08-28 DIAGNOSIS — I10 ESSENTIAL HYPERTENSION: Primary | ICD-10-CM

## 2024-08-28 DIAGNOSIS — E11.65 TYPE 2 DIABETES MELLITUS WITH HYPERGLYCEMIA, WITH LONG-TERM CURRENT USE OF INSULIN (HCC): ICD-10-CM

## 2024-08-28 DIAGNOSIS — E66.3 OVERWEIGHT (BMI 25.0-29.9): ICD-10-CM

## 2024-08-28 PROCEDURE — 99204 OFFICE O/P NEW MOD 45 MIN: CPT | Performed by: NURSE PRACTITIONER

## 2024-08-28 NOTE — PROGRESS NOTES
Ambulatory Visit  Name: Serafin Pineda      : 1965      MRN: [id number]  Encounter Provider: DEMETRA Erazo  Encounter Date: 2024   Encounter department: [facility] [place]    Assessment & Plan   1. Essential hypertension  Assessment & Plan:  BP under good control.  Continues on lisinopril.   2. Type 2 diabetes mellitus with hyperglycemia, with long-term current use of insulin ([facility])  Assessment & Plan:    Lab Results   Component Value Date    HGBA1C 10.6 (H) 2024     HGA1C improving but remains above goal    Treatment regimen: Continues Lantus 30 units daily  Increase Mounjaro to 7.5 mg weekly.     Discussed risks/complications associated with uncontrolled diabetes including organ involvement, heart attack, stroke, death.    Advised lifestyle modifications including attention to diet including the amount and types of carbohydrates consumed and regular activity.     Call for blood sugars less than 70 mg/dl or patterns over 250 mg/dl.     Monitor blood glucose levels at least 2 times a day    Discussed symptoms and treatment of hypoglycemia.  Reviewed risks associated with hypoglycemia. Always carry rapid acting carbohydrates and a glucometer (a way to check your blood sugar).    Recommendation for medical identification either bracelet, necklace.    Routine follow up for diabetic eye and foot exams.     Ordered blood work to complete prior to next visit.    Send glucose logs/CGM download in 1-2 weeks for review    Follow up in 3 months.     Orders:  -     Ambulatory Referral to Ophthalmology; Future; Expected date: 2024  -     Albumin / creatinine urine ratio; Future; Expected date: 2024  -     Comprehensive metabolic panel; Future; Expected date: 2024  -     Hemoglobin A1C; Future; Expected date: 2024  -     tirzepatide (Mounjaro) 7.5 MG/0.5ML; Inject 0.5 mL (7.5 mg total) under the skin every 7 days  3. Hyperlipidemia,  unspecified hyperlipidemia type  Assessment & Plan:  Continues on statin.   LDL close to goal at 73, improving.  Recommend repeat lipid panel.   Orders:  -     Lipid panel; Future; Expected date: 11/28/2024  4. Overweight (BMI 25.0-29.9)  Assessment & Plan:  Recommend lifestyle modifications including physical activity and carbohydrate conscious diet.       History of Present Illness     Serafin Pineda is a 58 y.o. male who presents with a history of type 2 diabetes mellitus with history of neuropathy. Last foot exam from May 2024.     Denies symptomatic hypo/hyperglycemia per report.     Denies recent hospitalization or illness.       Home blood glucose readings:   Before breakfast:  mg/dL  Before bedtime: 113-381 mg/dL     Current regimen:  Lantus 30 units daily  Mounjaro 5 mg weekly  Metformin  mg every other day     On ACE/ARB and statin.   Thyroid disorders: None  Pancreatitis: None    Review of Systems  Constitutional: Negative  HENT: Negative   Eyes: Negative for visual disturbance.   Respiratory: Negative for cough and shortness of breath.    Cardiovascular: Negative for chest pain and palpitations.   Gastrointestinal: Negative for abdominal distention, abdominal pain, constipation, diarrhea and vomiting.   Endocrine: Negative polydipsia and polyuria.   Musculoskeletal: Negative   Skin: Negative   Neurological: Negative   All other systems reviewed and are negative.    Past Medical History   Past Medical History:   Diagnosis Date    Anesthesia complication     difficulty waking up    Annual physical exam 12/29/2020    Diabetes mellitus (HCC)     Diabetes type 2, uncontrolled     Fatty liver     GERD (gastroesophageal reflux disease)     Hemorrhoid     Hyperlipidemia     Hypertension     Rectal bleeding     Wears glasses     Wears partial dentures     upper     Past Surgical History:   Procedure Laterality Date    APPENDECTOMY       Family History   Problem Relation Age of Onset    Diabetes type  II Mother     Hypertension Mother     Hypertension Father     Skin cancer Father     Diabetes type II Sister      Current Outpatient Medications on File Prior to Visit   Medication Sig Dispense Refill    ALPRAZolam ER (XANAX XR) 0.5 MG 24 hr tablet if needed      cholecalciferol (VITAMIN D3) 1,000 units tablet Take 1,000 Units by mouth daily      famotidine (PEPCID) 20 mg tablet Take 20 mg by mouth as needed       fexofenadine (ALLEGRA) 180 MG tablet Take 180 mg by mouth daily at bedtime      glucose 4 g chewable tablet Chew 4 tablets (16 g total) as needed for low blood sugar 100 tablet 1    Insulin Glargine Solostar (Lantus SoloStar) 100 UNIT/ML SOPN Inject 0.3 mL (30 Units total) under the skin daily at bedtime INJECT SUBCUTANEOUSLY 26 UNITS  AT BEDTIME 15 mL 2    lisinopril-hydrochlorothiazide (PRINZIDE,ZESTORETIC) 10-12.5 MG per tablet Take 1 tablet by mouth every morning      omeprazole (PriLOSEC) 20 mg delayed release capsule As needed      rosuvastatin (CRESTOR) 10 MG tablet Take 10 mg by mouth 4 (four) times a week M-W-F-S      Ronald Microlet Lancets lancets Use to test blood sugar twice a day 200 each 3    BD Pen Needle Melany 2nd Gen 32G X 4 MM MISC USE TO ADMINISTER INSULIN ONCE  DAILY 90 each 1    bisacodyl (DULCOLAX) 5 mg EC tablet Take 2 tablets (10 mg total) by mouth once for 1 dose (Patient not taking: Reported on 5/7/2024) 2 tablet 0    Blood Glucose Monitoring Suppl (Contour Next One) KIT Use 2 (two) times a day 1 kit 0    CINNAMON PO Take by mouth 2 (two) times a day Sugar-guard  Last dose 6/5/23 (Patient not taking: Reported on 5/7/2024)      FIBER ADULT GUMMIES PO Take by mouth in the morning (Patient not taking: Reported on 8/28/2024)      glucose blood (Contour Next Test) test strip Use as instructed; Check blood sugars twice a day 200 each 3    glucose blood (Contour Next Test) test strip Use to test blood sugar twice a aday 200 each 3    hydrocortisone (ANUSOL-HC) 25 mg suppository Insert 1  suppository (25 mg total) into the rectum 2 (two) times a day as needed for hemorrhoids for up to 6 days (Patient not taking: Reported on 5/7/2024) 12 suppository 1    loratadine (CLARITIN) 10 mg tablet Take 10 mg by mouth if needed (Patient not taking: Reported on 5/7/2024)      methocarbamol (ROBAXIN) 500 mg tablet Take 500 mg by mouth daily as needed for muscle spasms (Patient not taking: Reported on 8/28/2024)      ondansetron (ZOFRAN) 4 mg tablet As needed (Patient not taking: Reported on 8/28/2024)      polyethylene glycol (GOLYTELY) 4000 mL solution Take 4,000 mL by mouth once for 1 dose (Patient not taking: Reported on 5/7/2024) 4000 mL 0     No current facility-administered medications on file prior to visit.     Allergies   Allergen Reactions    Bydureon [Exenatide] Rash             Current Outpatient Medications on File Prior to Visit   Medication Sig Dispense Refill    ALPRAZolam ER (XANAX XR) 0.5 MG 24 hr tablet if needed      cholecalciferol (VITAMIN D3) 1,000 units tablet Take 1,000 Units by mouth daily      famotidine (PEPCID) 20 mg tablet Take 20 mg by mouth as needed       fexofenadine (ALLEGRA) 180 MG tablet Take 180 mg by mouth daily at bedtime      glucose 4 g chewable tablet Chew 4 tablets (16 g total) as needed for low blood sugar 100 tablet 1    Insulin Glargine Solostar (Lantus SoloStar) 100 UNIT/ML SOPN Inject 0.3 mL (30 Units total) under the skin daily at bedtime INJECT SUBCUTANEOUSLY 26 UNITS  AT BEDTIME 15 mL 2    lisinopril-hydrochlorothiazide (PRINZIDE,ZESTORETIC) 10-12.5 MG per tablet Take 1 tablet by mouth every morning      omeprazole (PriLOSEC) 20 mg delayed release capsule As needed      rosuvastatin (CRESTOR) 10 MG tablet Take 10 mg by mouth 4 (four) times a week M-W-F-S      Ronald Microlet Lancets lancets Use to test blood sugar twice a day 200 each 3    BD Pen Needle Melany 2nd Gen 32G X 4 MM MISC USE TO ADMINISTER INSULIN ONCE  DAILY 90 each 1    bisacodyl (DULCOLAX) 5 mg EC  "tablet Take 2 tablets (10 mg total) by mouth once for 1 dose (Patient not taking: Reported on 5/7/2024) 2 tablet 0    Blood Glucose Monitoring Suppl (Contour Next One) KIT Use 2 (two) times a day 1 kit 0    CINNAMON PO Take by mouth 2 (two) times a day Sugar-guard  Last dose 6/5/23 (Patient not taking: Reported on 5/7/2024)      FIBER ADULT GUMMIES PO Take by mouth in the morning (Patient not taking: Reported on 8/28/2024)      glucose blood (Contour Next Test) test strip Use as instructed; Check blood sugars twice a day 200 each 3    glucose blood (Contour Next Test) test strip Use to test blood sugar twice a aday 200 each 3    hydrocortisone (ANUSOL-HC) 25 mg suppository Insert 1 suppository (25 mg total) into the rectum 2 (two) times a day as needed for hemorrhoids for up to 6 days (Patient not taking: Reported on 5/7/2024) 12 suppository 1    loratadine (CLARITIN) 10 mg tablet Take 10 mg by mouth if needed (Patient not taking: Reported on 5/7/2024)      methocarbamol (ROBAXIN) 500 mg tablet Take 500 mg by mouth daily as needed for muscle spasms (Patient not taking: Reported on 8/28/2024)      ondansetron (ZOFRAN) 4 mg tablet As needed (Patient not taking: Reported on 8/28/2024)      polyethylene glycol (GOLYTELY) 4000 mL solution Take 4,000 mL by mouth once for 1 dose (Patient not taking: Reported on 5/7/2024) 4000 mL 0     No current facility-administered medications on file prior to visit.      Objective     /70 (BP Location: Left arm, Patient Position: Sitting, Cuff Size: Large)   Pulse 88   Ht 5' 11\" (1.803 m)   Wt 92.1 kg (203 lb)   SpO2 96%   BMI 28.31 kg/m²        Physical Exam  Vitals reviewed.   Constitutional:       Appearance: Normal appearance.   Cardiovascular:      Rate and Rhythm: Normal rate and regular rhythm.      Pulses: Normal pulses.      Heart sounds: Normal heart sounds.   Pulmonary:      Effort: Pulmonary effort is normal.      Breath sounds: Normal breath sounds.   Skin:     " General: Skin is warm and dry.      Capillary Refill: Capillary refill takes less than 2 seconds.   Neurological:      General: No focal deficit present.      Mental Status: He is alert and oriented to person, place, and time.   Psychiatric:         Mood and Affect: Mood normal.         Behavior: Behavior normal.     Administrative Statements

## 2024-08-28 NOTE — PATIENT INSTRUCTIONS
"Low blood sugar in people with diabetes   The Basics   Written by the doctors and editors at [facility]   What is low blood sugar? -- This is when the level of sugar in a person's blood gets too low. It is also called \"hypoglycemia.\"  Low blood sugar can cause symptoms ranging from sweating and feeling hungry to passing out.  Low blood sugar can happen in people with diabetes who take certain medicines, including insulin, other medicines given as shots, and some types of pills.  When can people with diabetes get low blood sugar? -- People with diabetes can get low blood sugar when they:   Take too much medicine, including insulin, other medicines given as shots, or certain diabetes pills   Do not eat enough food   Exercise too much without eating a snack or reducing their insulin dose   Wait too long between meals   Drink too much alcohol or drink alcohol on an empty stomach  What are the symptoms of low blood sugar? -- The symptoms can be different from person to person, and can change over time. During the early stages of low blood sugar, a person can:   Sweat or tremble   Feel hungry   Feel worried  People who have early symptoms should check their blood sugar level to see if it is low and needs to be treated. If low blood sugar levels are not treated, severe symptoms can occur. These can include:   Trouble walking or feeling weak   Trouble seeing clearly   Being confused or acting in a strange way   Passing out or having a seizure  Some people do not get symptoms during the early stages of low blood sugar. Doctors sometimes call this \"hypoglycemia unawareness.\" People with hypoglycemia unawareness are more likely to have severe symptoms, because they might not know that they have low blood sugar until they have severe symptoms. Hypoglycemia unawareness is more likely in people who:   Have had type 1 diabetes for more than 5 to 10 years   Have frequent episodes of low blood sugar   Use insulin to keep their blood " sugar level tightly managed   Are tired   Drink a lot of alcohol   Take certain medicines for high blood pressure or diabetes  How is low blood sugar treated? -- It can be treated with:   Quick sources of sugar - People can eat or drink quick sources of sugar (table 1). Foods that have fat, such as chocolate or cheese, do not treat low blood sugar as quickly. You and a family member should carry a quick source of sugar at all times.   A dose of glucagon - Glucagon is a hormone that can quickly raise blood sugar levels and stop severe symptoms. It comes as a shot (figure 1) or a nose spray. If your doctor recommends that you carry glucagon with you, they will tell you when and how to use it. If possible, it's also a good idea to have a family member, friend, or roommate learn how to give you glucagon. That way, they can give it to you if you can't do it yourself.  After treating low blood sugar, it is very important to recheck your blood sugar level to make sure that it rises and stays in the normal range. Once your blood sugar is normal, eat a small snack that contains protein, fat, and carbohydrate. This can help keep your blood sugar stable.  What should I do after treatment? -- After treatment for low blood sugar, most people can get back to their usual routine. But your doctor or nurse might recommend that you check your blood sugar level more often during the next 2 to 3 days.  If your low blood sugar was treated with glucagon, call your doctor or nurse. They might change the dose of your diabetes medicine.  How can I prevent low blood sugar? -- The best way is to:   Check your blood sugar levels often - Your doctor or nurse will tell you how and when to check your blood sugar levels at home. They will also tell you what your blood sugar levels should be, and when to treat low blood sugar.   Learn the symptoms of low blood sugar, and be ready to treat it in the early stages. Treating low blood sugar early can  "prevent severe symptoms.  When should I go to a hospital or call for an ambulance? -- A family member or friend should take you to a hospital or call for an ambulance (in the [place] and [place], call 9-1-1) if you:   Are still confused 15 minutes after being treated with a dose of glucagon   Have passed out, and there is no glucagon nearby   Still have low blood sugar after treatment  If you have low blood sugar, do not try to drive yourself to the hospital. Driving with low blood sugar can be dangerous.  All topics are updated as new evidence becomes available and our peer review process is complete.  This topic retrieved from [employer] on: Apr 11, 2024.  Topic 27351 Version 22.0  Release: 32.3.2 - C32.100  © 2024 UpToDate, Inc. and/or its affiliates. All rights reserved.  table 1: Quick sources of sugar to treat low blood sugar  3 or 4 glucose tablets   ½ cup of juice or regular soda (not sugar-free)   2 tablespoons of raisins   4 or 5 saltine crackers   1 tablespoon of sugar   1 tablespoon of honey or corn syrup   6 to 8 hard candies   These sources of sugar act quickly to treat low blood sugar levels. People with diabetes who use insulin or certain other diabetes medicines should carry at least 1 of these items at all times.  Graphic 75867 Version 4.0  figure 1: Glucagon autoinjector     Some people carry glucagon in the form of an autoinjector \"pen.\" This makes it easy to give a dose into the upper arm, thigh, or belly.  Graphic 730394 Version 2.0  Consumer Information Use and Disclaimer   Disclaimer: This generalized information is a limited summary of diagnosis, treatment, and/or medication information. It is not meant to be comprehensive and should be used as a tool to help the user understand and/or assess potential diagnostic and treatment options. It does NOT include all information about conditions, treatments, medications, side effects, or risks that may apply to a specific patient. It is not intended to be " medical advice or a substitute for the medical advice, diagnosis, or treatment of a health care provider based on the health care provider's examination and assessment of a patient's specific and unique circumstances. Patients must speak with a health care provider for complete information about their health, medical questions, and treatment options, including any risks or benefits regarding use of medications. This information does not endorse any treatments or medications as safe, effective, or approved for treating a specific patient. UpToDate, Inc. and its affiliates disclaim any warranty or liability relating to this information or the use thereof.The use of this information is governed by the Terms of Use, available at https://www.[employer].com/en/know/clinical-effectiveness-terms. 2024© UpToDate, Inc. and its affiliates and/or licensors. All rights reserved.  Copyright   © 2024 UpToDate, Inc. and/or its affiliates. All rights reserved.

## 2024-08-28 NOTE — ASSESSMENT & PLAN NOTE
Lab Results   Component Value Date    HGBA1C 10.6 (H) 08/06/2024     HGA1C improving but remains above goal    Treatment regimen: Continues Lantus 30 units daily  Increase Mounjaro to 7.5 mg weekly.     Discussed risks/complications associated with uncontrolled diabetes including organ involvement, heart attack, stroke, death.    Advised lifestyle modifications including attention to diet including the amount and types of carbohydrates consumed and regular activity.     Call for blood sugars less than 70 mg/dl or patterns over 250 mg/dl.     Monitor blood glucose levels at least 2 times a day    Discussed symptoms and treatment of hypoglycemia.  Reviewed risks associated with hypoglycemia. Always carry rapid acting carbohydrates and a glucometer (a way to check your blood sugar).    Recommendation for medical identification either bracelet, necklace.    Routine follow up for diabetic eye and foot exams.     Ordered blood work to complete prior to next visit.    Send glucose logs/CGM download in 1-2 weeks for review    Follow up in 3 months.

## 2024-08-30 ENCOUNTER — TELEPHONE (OUTPATIENT)
Age: 59
End: 2024-08-30

## 2024-09-05 NOTE — TELEPHONE ENCOUNTER
PA for Mounjaro 7.5mg SUBMITTED     via    [x]CMM-KEY: [id number]  [][employer] Mobile-Case ID #   []Faxed to plan   []Other website   []Phone call Case ID #     Office notes sent, clinical questions answered. Awaiting determination    Turnaround time for your insurance to make a decision on your Prior Authorization can take 7-21 business days.

## 2024-09-05 NOTE — TELEPHONE ENCOUNTER
"Patient called- he went on CoverMyMeds with the link from [employer] and it said \"there is an error connecting your information with the prior authorization information.\"    He isn't sure what to do or why this happened? Please advise.  "

## 2024-09-10 ENCOUNTER — TELEPHONE (OUTPATIENT)
Dept: ENDOCRINOLOGY | Facility: CLINIC | Age: 59
End: 2024-09-10

## 2024-09-12 ENCOUNTER — TELEPHONE (OUTPATIENT)
Age: 59
End: 2024-09-12

## 2024-09-13 NOTE — TELEPHONE ENCOUNTER
Resubmitted 09/12/2024 - please see encounter from 08/30 for details   PAST MEDICAL HISTORY:  Eczema     GERD (gastroesophageal reflux disease)     No pertinent past medical history

## 2024-10-19 DIAGNOSIS — E11.65 TYPE 2 DIABETES MELLITUS WITH HYPERGLYCEMIA, WITHOUT LONG-TERM CURRENT USE OF INSULIN (HCC): ICD-10-CM

## 2024-10-19 DIAGNOSIS — I10 HYPERTENSION GOAL BP (BLOOD PRESSURE) < 140/90: ICD-10-CM

## 2024-10-19 DIAGNOSIS — E78.2 MIXED HYPERLIPIDEMIA: ICD-10-CM

## 2024-10-21 RX ORDER — PEN NEEDLE, DIABETIC 32GX 5/32"
NEEDLE, DISPOSABLE MISCELLANEOUS
Qty: 90 EACH | Refills: 1 | Status: SHIPPED | OUTPATIENT
Start: 2024-10-21

## 2024-12-05 ENCOUNTER — APPOINTMENT (OUTPATIENT)
Dept: LAB | Facility: CLINIC | Age: 59
End: 2024-12-05
Payer: COMMERCIAL

## 2024-12-05 DIAGNOSIS — Z79.4 TYPE 2 DIABETES MELLITUS WITH HYPERGLYCEMIA, WITH LONG-TERM CURRENT USE OF INSULIN (HCC): ICD-10-CM

## 2024-12-05 DIAGNOSIS — E78.5 HYPERLIPIDEMIA, UNSPECIFIED HYPERLIPIDEMIA TYPE: ICD-10-CM

## 2024-12-05 DIAGNOSIS — E11.65 TYPE 2 DIABETES MELLITUS WITH HYPERGLYCEMIA, WITH LONG-TERM CURRENT USE OF INSULIN (HCC): ICD-10-CM

## 2024-12-05 LAB
ALBUMIN SERPL BCG-MCNC: 4.5 G/DL (ref 3.5–5)
ALP SERPL-CCNC: 69 U/L (ref 34–104)
ALT SERPL W P-5'-P-CCNC: 58 U/L (ref 7–52)
ANION GAP SERPL CALCULATED.3IONS-SCNC: 9 MMOL/L (ref 4–13)
AST SERPL W P-5'-P-CCNC: 39 U/L (ref 13–39)
BILIRUB SERPL-MCNC: 1.68 MG/DL (ref 0.2–1)
BUN SERPL-MCNC: 15 MG/DL (ref 5–25)
CALCIUM SERPL-MCNC: 9.5 MG/DL (ref 8.4–10.2)
CHLORIDE SERPL-SCNC: 97 MMOL/L (ref 96–108)
CHOLEST SERPL-MCNC: 147 MG/DL (ref ?–200)
CO2 SERPL-SCNC: 28 MMOL/L (ref 21–32)
CREAT SERPL-MCNC: 0.86 MG/DL (ref 0.6–1.3)
CREAT UR-MCNC: 214.8 MG/DL
EST. AVERAGE GLUCOSE BLD GHB EST-MCNC: 260 MG/DL
GFR SERPL CREATININE-BSD FRML MDRD: 96 ML/MIN/1.73SQ M
GLUCOSE P FAST SERPL-MCNC: 253 MG/DL (ref 65–99)
HBA1C MFR BLD: 10.7 %
HDLC SERPL-MCNC: 43 MG/DL
LDLC SERPL CALC-MCNC: 76 MG/DL (ref 0–100)
MICROALBUMIN UR-MCNC: 79.8 MG/L
MICROALBUMIN/CREAT 24H UR: 37 MG/G CREATININE (ref 0–30)
NONHDLC SERPL-MCNC: 104 MG/DL
POTASSIUM SERPL-SCNC: 4.5 MMOL/L (ref 3.5–5.3)
PROT SERPL-MCNC: 7.2 G/DL (ref 6.4–8.4)
SODIUM SERPL-SCNC: 134 MMOL/L (ref 135–147)
TRIGL SERPL-MCNC: 141 MG/DL (ref ?–150)

## 2024-12-05 PROCEDURE — 80053 COMPREHEN METABOLIC PANEL: CPT

## 2024-12-05 PROCEDURE — 83036 HEMOGLOBIN GLYCOSYLATED A1C: CPT

## 2024-12-05 PROCEDURE — 82570 ASSAY OF URINE CREATININE: CPT

## 2024-12-05 PROCEDURE — 36415 COLL VENOUS BLD VENIPUNCTURE: CPT

## 2024-12-05 PROCEDURE — 82043 UR ALBUMIN QUANTITATIVE: CPT

## 2024-12-05 PROCEDURE — 80061 LIPID PANEL: CPT

## 2024-12-06 ENCOUNTER — RESULTS FOLLOW-UP (OUTPATIENT)
Dept: ENDOCRINOLOGY | Facility: CLINIC | Age: 59
End: 2024-12-06

## 2024-12-12 ENCOUNTER — OFFICE VISIT (OUTPATIENT)
Dept: ENDOCRINOLOGY | Facility: CLINIC | Age: 59
End: 2024-12-12
Payer: COMMERCIAL

## 2024-12-12 VITALS
HEART RATE: 94 BPM | HEIGHT: 71 IN | BODY MASS INDEX: 28.36 KG/M2 | DIASTOLIC BLOOD PRESSURE: 63 MMHG | OXYGEN SATURATION: 99 % | WEIGHT: 202.6 LBS | SYSTOLIC BLOOD PRESSURE: 119 MMHG

## 2024-12-12 DIAGNOSIS — E11.65 TYPE 2 DIABETES MELLITUS WITH HYPERGLYCEMIA, WITH LONG-TERM CURRENT USE OF INSULIN (HCC): ICD-10-CM

## 2024-12-12 DIAGNOSIS — Z79.4 TYPE 2 DIABETES MELLITUS WITH HYPERGLYCEMIA, WITH LONG-TERM CURRENT USE OF INSULIN (HCC): ICD-10-CM

## 2024-12-12 DIAGNOSIS — I10 ESSENTIAL HYPERTENSION: Primary | ICD-10-CM

## 2024-12-12 PROCEDURE — 99204 OFFICE O/P NEW MOD 45 MIN: CPT | Performed by: NURSE PRACTITIONER

## 2024-12-12 RX ORDER — TIRZEPATIDE 10 MG/.5ML
10 INJECTION, SOLUTION SUBCUTANEOUS WEEKLY
Qty: 2 ML | Refills: 2 | Status: SHIPPED | OUTPATIENT
Start: 2024-12-12

## 2024-12-12 RX ORDER — VALACYCLOVIR HYDROCHLORIDE 500 MG/1
TABLET, FILM COATED ORAL
COMMUNITY
Start: 2024-10-05

## 2024-12-12 NOTE — ASSESSMENT & PLAN NOTE
Lab Results   Component Value Date    HGBA1C 10.7 (H) 12/05/2024     HGA1C above goal. Recommend increasing dose of Mounjaro. Will also check c-peptide and insulin antibodies to assess for GENNA/Type 1.     Discussed risks/complications associated with uncontrolled diabetes including organ involvement, heart attack, stroke, death.    Advised lifestyle modifications including attention to diet including the amount and types of carbohydrates consumed and regular activity.     Call for blood sugars less than 70 mg/dl or patterns over 250 mg/dl.     Discussed symptoms and treatment of hypoglycemia.  Reviewed risks associated with hypoglycemia. Always carry rapid acting carbohydrates and a glucometer (a way to check your blood sugar).    Recommendation for medical identification either bracelet, necklace.    Routine follow up for diabetic eye and foot exams.     Ordered blood work to complete prior to next visit.    Send glucose logs/CGM download in 1-2 weeks for review    Follow up in 3 months.     Orders:    Tirzepatide (Mounjaro) 10 MG/0.5ML SOAJ; Inject 10 mg under the skin once a week    Hemoglobin A1C; Future    Comprehensive metabolic panel; Future    Lipid panel; Future    Albumin / creatinine urine ratio; Future    C-peptide; Future    GAD65, IA-2, and Insulin Autoantibody; Future    Zinc Transporter 8 (Znt8) Antibody; Future

## 2024-12-12 NOTE — PROGRESS NOTES
Name: Serafin Pineda      : 1965      MRN: [id number]  Encounter Provider: DEMETRA Erazo  Encounter Date: 2024   Encounter department: [facility]  :  Assessment & Plan  Type 2 diabetes mellitus with hyperglycemia, with long-term current use of insulin ([facility])    Lab Results   Component Value Date    HGBA1C 10.7 (H) 2024     HGA1C above goal. Recommend increasing dose of Mounjaro. Will also check c-peptide and insulin antibodies to assess for GENNA/Type 1.     Discussed risks/complications associated with uncontrolled diabetes including organ involvement, heart attack, stroke, death.    Advised lifestyle modifications including attention to diet including the amount and types of carbohydrates consumed and regular activity.     Call for blood sugars less than 70 mg/dl or patterns over 250 mg/dl.     Discussed symptoms and treatment of hypoglycemia.  Reviewed risks associated with hypoglycemia. Always carry rapid acting carbohydrates and a glucometer (a way to check your blood sugar).    Recommendation for medical identification either bracelet, necklace.    Routine follow up for diabetic eye and foot exams.     Ordered blood work to complete prior to next visit.    Send glucose logs/CGM download in 1-2 weeks for review    Follow up in 3 months.     Orders:    Tirzepatide (Mounjaro) 10 MG/0.5ML SOAJ; Inject 10 mg under the skin once a week    Hemoglobin A1C; Future    Comprehensive metabolic panel; Future    Lipid panel; Future    Albumin / creatinine urine ratio; Future    C-peptide; Future    GAD65, IA-2, and Insulin Autoantibody; Future    Zinc Transporter 8 (Znt8) Antibody; Future    Essential hypertension  BP under good control.   Continues on ACE.             History of Present Illness   HPI  Serafin Pineda is a 58 y.o. male who presents with a history of type 2 diabetes mellitus with history of neuropathy.     Denies symptomatic  hypo/hyperglycemia per report requiring medical attention or third party assistance. Denies worsening polyuria, polydipsia, or sensation in feet.      Denies recent hospitalization or illness.     UTD eye exam. No vision changes. No retinopathy.     UTD diabetic foot exam.      Home blood glucose readings:   Before breakfast: 203-297 mg/dL  Before bedtime: 193-339 mg/dL     Current regimen:  Lantus 30 units daily  Mounjaro 7.5 mg weekly    Tolerating without abdominal pain, nausea, vomiting, diarrhea, constipation, or severe appetite suppression.      On ACE/ARB and statin.   Thyroid disorders: None  Pancreatitis: None    Review of Systems  See HPI.   All other systems reviewed and are negative.    Medical History Reviewed by provider this encounter:  Tobacco  Allergies  Meds  Problems  Med Hx  Surg Hx  Fam Hx     .  Current Outpatient Medications on File Prior to Visit   Medication Sig Dispense Refill    Ronald Microlet Lancets lancets Use to test blood sugar twice a day 200 each 3    BD Pen Needle Melany U/F 32G X 4 MM MISC USE TO ADMINISTER INSULIN ONCE  DAILY 90 each 1    Blood Glucose Monitoring Suppl (Contour Next One) KIT Use 2 (two) times a day 1 kit 0    cholecalciferol (VITAMIN D3) 1,000 units tablet Take 1,000 Units by mouth daily      famotidine (PEPCID) 20 mg tablet Take 20 mg by mouth as needed       fexofenadine (ALLEGRA) 180 MG tablet Take 180 mg by mouth daily at bedtime      glucose 4 g chewable tablet Chew 4 tablets (16 g total) as needed for low blood sugar 100 tablet 1    glucose blood (Contour Next Test) test strip Use as instructed; Check blood sugars twice a day 200 each 3    Insulin Glargine Solostar (Lantus SoloStar) 100 UNIT/ML SOPN Inject 0.3 mL (30 Units total) under the skin daily at bedtime INJECT SUBCUTANEOUSLY 26 UNITS  AT BEDTIME 15 mL 2    lisinopril-hydrochlorothiazide (PRINZIDE,ZESTORETIC) 10-12.5 MG per tablet Take 1 tablet by mouth every morning      omeprazole (PriLOSEC)  "20 mg delayed release capsule As needed      rosuvastatin (CRESTOR) 10 MG tablet Take 10 mg by mouth 4 (four) times a week M-W-F-S      valACYclovir (VALTREX) 500 mg tablet       ALPRAZolam ER (XANAX XR) 0.5 MG 24 hr tablet if needed       No current facility-administered medications on file prior to visit.         Objective   /63 (BP Location: Left arm, Patient Position: Sitting, Cuff Size: Large)   Pulse 94   Ht 5' 11\" (1.803 m)   Wt 91.9 kg (202 lb 9.6 oz)   SpO2 99%   BMI 28.26 kg/m²        Physical Exam   Constitutional: He is oriented to person, place, and time. He appears well-developed and well-nourished. No distress.   HENT:   Head: Normocephalic and atraumatic.   Neck: Normal range of motion.   Pulmonary/Chest: Effort normal.   Musculoskeletal: Normal range of motion.   Neurological: He is alert and oriented to person, place, and time.   Skin: He is not diaphoretic.   Psychiatric: He has a normal mood and affect. His behavior is normal.     Labs:   Component      Latest Ref Rng 12/5/2024   Sodium      135 - 147 mmol/L 134 (L)    Potassium      3.5 - 5.3 mmol/L 4.5    Chloride      96 - 108 mmol/L 97    Carbon Dioxide      21 - 32 mmol/L 28    ANION GAP      4 - 13 mmol/L 9    BUN      5 - 25 mg/dL 15    Creatinine      0.60 - 1.30 mg/dL 0.86    GLUCOSE, FASTING      65 - 99 mg/dL 253 (H)    Calcium      8.4 - 10.2 mg/dL 9.5    AST      13 - 39 U/L 39    ALT      7 - 52 U/L 58 (H)    ALK PHOS      34 - 104 U/L 69    Total Protein      6.4 - 8.4 g/dL 7.2    Albumin      3.5 - 5.0 g/dL 4.5    Total Bilirubin      0.20 - 1.00 mg/dL 1.68 (H)    GFR, Calculated      ml/min/1.73sq m 96    Cholesterol      See Comment mg/dL 147    Triglycerides      See Comment mg/dL 141    HDL      >=40 mg/dL 43    LDL Calculated      0 - 100 mg/dL 76    Non-HDL Cholesterol      mg/dl 104    EXT Creatinine Urine      Reference range not established. mg/dL 214.8    Albumin,U,Random      <20.0 mg/L 79.8 (H)    Albumin " Creat Ratio      0 - 30 mg/g creatinine 37 (H)    Hemoglobin A1C      Normal 4.0-5.6%; PreDiabetic 5.7-6.4%; Diabetic >=6.5%; Glycemic control for adults with diabetes <7.0% % 10.7 (H)    eAG, EST AVG Glucose      mg/dl 260        Administrative Statements

## 2024-12-13 ENCOUNTER — TELEPHONE (OUTPATIENT)
Age: 59
End: 2024-12-13

## 2024-12-13 NOTE — TELEPHONE ENCOUNTER
PA for Mounjaro) 10 MG/0.5ML SUBMITTED to [employer]     via    [x]CMM-KEY: [id number]    [x]PA sent as URGENT    All office notes, labs and other pertaining documents and studies sent. Clinical questions answered. Awaiting determination from insurance company.     Turnaround time for your insurance to make a decision on your Prior Authorization can take 7-21 business days.

## 2024-12-17 NOTE — TELEPHONE ENCOUNTER
PA for Mounjaro 10 MG/0.5ML  APPROVED     Date(s) approved 12/13/2024 to 12/13/2025    Case #[id number]     Patient advised by          [x]FITiSTt Message  []Phone call   [x]LMOM  []L/M to call office as no active Communication consent on file  []Unable to leave detailed message as VM not approved on Communication consent       Pharmacy advised by    [x]Fax  []Phone call    Approval letter scanned into Media Yes

## 2025-02-19 DIAGNOSIS — E11.65 TYPE 2 DIABETES MELLITUS WITH HYPERGLYCEMIA, WITH LONG-TERM CURRENT USE OF INSULIN (HCC): ICD-10-CM

## 2025-02-19 DIAGNOSIS — Z79.4 TYPE 2 DIABETES MELLITUS WITH HYPERGLYCEMIA, WITH LONG-TERM CURRENT USE OF INSULIN (HCC): ICD-10-CM

## 2025-02-19 RX ORDER — TIRZEPATIDE 10 MG/.5ML
INJECTION, SOLUTION SUBCUTANEOUS
Qty: 6 ML | Refills: 1 | Status: SHIPPED | OUTPATIENT
Start: 2025-02-19

## 2025-03-31 ENCOUNTER — APPOINTMENT (OUTPATIENT)
Dept: LAB | Facility: CLINIC | Age: 60
End: 2025-03-31
Payer: COMMERCIAL

## 2025-03-31 DIAGNOSIS — E11.65 TYPE 2 DIABETES MELLITUS WITH HYPERGLYCEMIA, WITH LONG-TERM CURRENT USE OF INSULIN (HCC): ICD-10-CM

## 2025-03-31 DIAGNOSIS — Z79.4 TYPE 2 DIABETES MELLITUS WITH HYPERGLYCEMIA, WITH LONG-TERM CURRENT USE OF INSULIN (HCC): ICD-10-CM

## 2025-03-31 LAB
ALBUMIN SERPL BCG-MCNC: 4.4 G/DL (ref 3.5–5)
ALP SERPL-CCNC: 92 U/L (ref 34–104)
ALT SERPL W P-5'-P-CCNC: 58 U/L (ref 7–52)
ANION GAP SERPL CALCULATED.3IONS-SCNC: 7 MMOL/L (ref 4–13)
AST SERPL W P-5'-P-CCNC: 34 U/L (ref 13–39)
BILIRUB SERPL-MCNC: 1.92 MG/DL (ref 0.2–1)
BUN SERPL-MCNC: 15 MG/DL (ref 5–25)
CALCIUM SERPL-MCNC: 9.3 MG/DL (ref 8.4–10.2)
CHLORIDE SERPL-SCNC: 96 MMOL/L (ref 96–108)
CHOLEST SERPL-MCNC: 151 MG/DL (ref ?–200)
CO2 SERPL-SCNC: 30 MMOL/L (ref 21–32)
CREAT SERPL-MCNC: 0.83 MG/DL (ref 0.6–1.3)
CREAT UR-MCNC: 279.9 MG/DL
EST. AVERAGE GLUCOSE BLD GHB EST-MCNC: 298 MG/DL
GFR SERPL CREATININE-BSD FRML MDRD: 96 ML/MIN/1.73SQ M
GLUCOSE P FAST SERPL-MCNC: 256 MG/DL (ref 65–99)
HBA1C MFR BLD: 12 %
HDLC SERPL-MCNC: 45 MG/DL
LDLC SERPL CALC-MCNC: 76 MG/DL (ref 0–100)
MICROALBUMIN UR-MCNC: 128.2 MG/L
MICROALBUMIN/CREAT 24H UR: 46 MG/G CREATININE (ref 0–30)
NONHDLC SERPL-MCNC: 106 MG/DL
POTASSIUM SERPL-SCNC: 4.3 MMOL/L (ref 3.5–5.3)
PROT SERPL-MCNC: 7.1 G/DL (ref 6.4–8.4)
SODIUM SERPL-SCNC: 133 MMOL/L (ref 135–147)
TRIGL SERPL-MCNC: 148 MG/DL (ref ?–150)

## 2025-03-31 PROCEDURE — 82043 UR ALBUMIN QUANTITATIVE: CPT

## 2025-03-31 PROCEDURE — 82570 ASSAY OF URINE CREATININE: CPT

## 2025-03-31 PROCEDURE — 86341 ISLET CELL ANTIBODY: CPT

## 2025-03-31 PROCEDURE — 80053 COMPREHEN METABOLIC PANEL: CPT

## 2025-03-31 PROCEDURE — 80061 LIPID PANEL: CPT

## 2025-03-31 PROCEDURE — 36415 COLL VENOUS BLD VENIPUNCTURE: CPT

## 2025-03-31 PROCEDURE — 86337 INSULIN ANTIBODIES: CPT

## 2025-03-31 PROCEDURE — 83036 HEMOGLOBIN GLYCOSYLATED A1C: CPT

## 2025-03-31 PROCEDURE — 83519 RIA NONANTIBODY: CPT

## 2025-03-31 PROCEDURE — 84681 ASSAY OF C-PEPTIDE: CPT

## 2025-04-02 LAB — C PEPTIDE SERPL-MCNC: 2.6 NG/ML (ref 1.1–4.4)

## 2025-04-04 LAB — GAD65 AB SER-ACNC: <5 U/ML (ref 0–5)

## 2025-04-10 LAB — ISLET CELL512 AB SER-ACNC: <7.5 U/ML

## 2025-04-11 LAB — INSULIN AB SER-ACNC: <5 UU/ML

## 2025-04-13 LAB — ZNT8 AB SERPL IA-ACNC: <15 U/ML

## 2025-04-14 ENCOUNTER — RESULTS FOLLOW-UP (OUTPATIENT)
Dept: ENDOCRINOLOGY | Facility: CLINIC | Age: 60
End: 2025-04-14

## 2025-05-02 DIAGNOSIS — I10 HYPERTENSION GOAL BP (BLOOD PRESSURE) < 140/90: ICD-10-CM

## 2025-05-02 DIAGNOSIS — E78.2 MIXED HYPERLIPIDEMIA: ICD-10-CM

## 2025-05-02 DIAGNOSIS — E11.65 TYPE 2 DIABETES MELLITUS WITH HYPERGLYCEMIA, WITHOUT LONG-TERM CURRENT USE OF INSULIN (HCC): ICD-10-CM

## 2025-05-02 RX ORDER — PEN NEEDLE, DIABETIC 32GX 5/32"
NEEDLE, DISPOSABLE MISCELLANEOUS
Qty: 90 EACH | Refills: 3 | Status: SHIPPED | OUTPATIENT
Start: 2025-05-02

## 2025-05-12 ENCOUNTER — TELEPHONE (OUTPATIENT)
Age: 60
End: 2025-05-12

## 2025-05-12 ENCOUNTER — OFFICE VISIT (OUTPATIENT)
Dept: ENDOCRINOLOGY | Facility: CLINIC | Age: 60
End: 2025-05-12
Payer: COMMERCIAL

## 2025-05-12 VITALS
SYSTOLIC BLOOD PRESSURE: 127 MMHG | OXYGEN SATURATION: 97 % | HEART RATE: 79 BPM | HEIGHT: 71 IN | DIASTOLIC BLOOD PRESSURE: 63 MMHG | WEIGHT: 199.2 LBS | BODY MASS INDEX: 27.89 KG/M2

## 2025-05-12 DIAGNOSIS — E78.2 MIXED HYPERLIPIDEMIA: ICD-10-CM

## 2025-05-12 DIAGNOSIS — E66.3 OVERWEIGHT (BMI 25.0-29.9): ICD-10-CM

## 2025-05-12 DIAGNOSIS — Z79.4 TYPE 2 DIABETES MELLITUS WITH HYPERGLYCEMIA, WITH LONG-TERM CURRENT USE OF INSULIN (HCC): Primary | ICD-10-CM

## 2025-05-12 DIAGNOSIS — E11.65 TYPE 2 DIABETES MELLITUS WITH HYPERGLYCEMIA, WITH LONG-TERM CURRENT USE OF INSULIN (HCC): Primary | ICD-10-CM

## 2025-05-12 DIAGNOSIS — E78.5 HYPERLIPIDEMIA, UNSPECIFIED HYPERLIPIDEMIA TYPE: ICD-10-CM

## 2025-05-12 DIAGNOSIS — I10 ESSENTIAL HYPERTENSION: ICD-10-CM

## 2025-05-12 PROCEDURE — 99214 OFFICE O/P EST MOD 30 MIN: CPT | Performed by: NURSE PRACTITIONER

## 2025-05-12 RX ORDER — INSULIN GLARGINE 100 [IU]/ML
34 INJECTION, SOLUTION SUBCUTANEOUS
Qty: 45 ML | Refills: 2 | Status: SHIPPED | OUTPATIENT
Start: 2025-05-12 | End: 2025-05-15 | Stop reason: SDUPTHER

## 2025-05-12 RX ORDER — INSULIN LISPRO 100 [IU]/ML
5 INJECTION, SOLUTION INTRAVENOUS; SUBCUTANEOUS
Qty: 15 ML | Refills: 0 | Status: SHIPPED | OUTPATIENT
Start: 2025-05-12

## 2025-05-12 RX ORDER — INSULIN GLARGINE 100 [IU]/ML
34 INJECTION, SOLUTION SUBCUTANEOUS
Qty: 45 ML | Refills: 2 | Status: SHIPPED | OUTPATIENT
Start: 2025-05-12 | End: 2025-05-12 | Stop reason: SDUPTHER

## 2025-05-12 RX ORDER — PEN NEEDLE, DIABETIC 32GX 5/32"
NEEDLE, DISPOSABLE MISCELLANEOUS 4 TIMES DAILY
Qty: 400 EACH | Refills: 3 | Status: SHIPPED | OUTPATIENT
Start: 2025-05-12

## 2025-05-12 NOTE — TELEPHONE ENCOUNTER
"Phone call from patient to inform DARIEL that the insulin she ordered today \"will not be available until 6pm tomorrow at pharmacy\". Patient declined to speak to nurse - just wanted to inform DEMETRA Alexander.     Patient instructed to contact office with any concerns. Patient agreeable.   "

## 2025-05-12 NOTE — ASSESSMENT & PLAN NOTE
Lifestyle modifications including moderate intensity physical activity up to 150 minutes/week with 2-3 days of weight training in addition to carb conscious diet.

## 2025-05-12 NOTE — PATIENT INSTRUCTIONS
"Increase Lantus to 34 units daily  Start Humalog 5 units three times a day before meals.     In 1-2 weeks send in blood sugar logs for review or sooner if blood sugar patterns are less than 70 or over 250 mg/dl.     Low blood sugar in people with diabetes   The Basics   Written by the doctors and editors at [facility]   What is low blood sugar? -- This is when the level of sugar in a person's blood gets too low. It is also called \"hypoglycemia.\"  Low blood sugar can cause symptoms ranging from sweating and feeling hungry to passing out.  Low blood sugar can happen in people with diabetes who take certain medicines, including insulin, other medicines given as shots, and some types of pills.  When can people with diabetes get low blood sugar? -- People with diabetes can get low blood sugar when they:   Take too much medicine, including insulin, other medicines given as shots, or certain diabetes pills   Do not eat enough food   Exercise too much without eating a snack or reducing their insulin dose   Wait too long between meals   Drink too much alcohol or drink alcohol on an empty stomach  What are the symptoms of low blood sugar? -- The symptoms can be different from person to person, and can change over time. During the early stages of low blood sugar, a person can:   Sweat or tremble   Feel hungry   Feel worried  People who have early symptoms should check their blood sugar level to see if it is low and needs to be treated. If low blood sugar levels are not treated, severe symptoms can occur. These can include:   Trouble walking or feeling weak   Trouble seeing clearly   Being confused or acting in a strange way   Passing out or having a seizure  Some people do not get symptoms during the early stages of low blood sugar. Doctors sometimes call this \"hypoglycemia unawareness.\" People with hypoglycemia unawareness are more likely to have severe symptoms, because they might not know that they have low blood sugar until " they have severe symptoms. Hypoglycemia unawareness is more likely in people who:   Have had type 1 diabetes for more than 5 to 10 years   Have frequent episodes of low blood sugar   Use insulin to keep their blood sugar level tightly managed   Are tired   Drink a lot of alcohol   Take certain medicines for high blood pressure or diabetes  How is low blood sugar treated? -- It can be treated with:   Quick sources of sugar - People can eat or drink quick sources of sugar (table 1). Foods that have fat, such as chocolate or cheese, do not treat low blood sugar as quickly. You and a family member should carry a quick source of sugar at all times.   A dose of glucagon - Glucagon is a hormone that can quickly raise blood sugar levels and stop severe symptoms. It comes as a shot (figure 1) or a nose spray. If your doctor recommends that you carry glucagon with you, they will tell you when and how to use it. If possible, it's also a good idea to have a family member, friend, or roommate learn how to give you glucagon. That way, they can give it to you if you can't do it yourself.  After treating low blood sugar, it is very important to recheck your blood sugar level to make sure that it rises and stays in the normal range. Once your blood sugar is normal, eat a small snack that contains protein, fat, and carbohydrate. This can help keep your blood sugar stable.  What should I do after treatment? -- After treatment for low blood sugar, most people can get back to their usual routine. But your doctor or nurse might recommend that you check your blood sugar level more often during the next 2 to 3 days.  If your low blood sugar was treated with glucagon, call your doctor or nurse. They might change the dose of your diabetes medicine.  How can I prevent low blood sugar? -- The best way is to:   Check your blood sugar levels often - Your doctor or nurse will tell you how and when to check your blood sugar levels at home. They  "will also tell you what your blood sugar levels should be, and when to treat low blood sugar.   Learn the symptoms of low blood sugar, and be ready to treat it in the early stages. Treating low blood sugar early can prevent severe symptoms.  When should I go to a hospital or call for an ambulance? -- A family member or friend should take you to a hospital or call for an ambulance (in the [place] and [place], call 9-1-1) if you:   Are still confused 15 minutes after being treated with a dose of glucagon   Have passed out, and there is no glucagon nearby   Still have low blood sugar after treatment  If you have low blood sugar, do not try to drive yourself to the hospital. Driving with low blood sugar can be dangerous.  All topics are updated as new evidence becomes available and our peer review process is complete.  This topic retrieved from [employer] on: Apr 11, 2024.  Topic 10459 Version 22.0  Release: 32.3.2 - C32.100  © 2024 UpToDate, Inc. and/or its affiliates. All rights reserved.  table 1: Quick sources of sugar to treat low blood sugar  3 or 4 glucose tablets   ½ cup of juice or regular soda (not sugar-free)   2 tablespoons of raisins   4 or 5 saltine crackers   1 tablespoon of sugar   1 tablespoon of honey or corn syrup   6 to 8 hard candies   These sources of sugar act quickly to treat low blood sugar levels. People with diabetes who use insulin or certain other diabetes medicines should carry at least 1 of these items at all times.  Graphic 50985 Version 4.0  figure 1: Glucagon autoinjector     Some people carry glucagon in the form of an autoinjector \"pen.\" This makes it easy to give a dose into the upper arm, thigh, or belly.  Graphic 072280 Version 2.0  Consumer Information Use and Disclaimer   Disclaimer: This generalized information is a limited summary of diagnosis, treatment, and/or medication information. It is not meant to be comprehensive and should be used as a tool to help the user understand and/or " assess potential diagnostic and treatment options. It does NOT include all information about conditions, treatments, medications, side effects, or risks that may apply to a specific patient. It is not intended to be medical advice or a substitute for the medical advice, diagnosis, or treatment of a health care provider based on the health care provider's examination and assessment of a patient's specific and unique circumstances. Patients must speak with a health care provider for complete information about their health, medical questions, and treatment options, including any risks or benefits regarding use of medications. This information does not endorse any treatments or medications as safe, effective, or approved for treating a specific patient. UpToDate, Inc. and its affiliates disclaim any warranty or liability relating to this information or the use thereof.The use of this information is governed by the Terms of Use, available at https://www.[employer].com/en/know/clinical-effectiveness-terms. 2024© UpToDate, Inc. and its affiliates and/or licensors. All rights reserved.  Copyright   © 2024 UpToDate, Inc. and/or its affiliates. All rights reserved.

## 2025-05-12 NOTE — PROGRESS NOTES
Name: Serafin Pineda      : 1965      MRN: [id number]  Encounter Provider: DEMETRA Erazo  Encounter Date: 2025   Encounter department: [facility]    :  Assessment & Plan  Type 2 diabetes mellitus with hyperglycemia, with long-term current use of insulin ([facility])    Lab Results   Component Value Date    HGBA1C 12.0 (H) 2025     HGA1C above goal-- indicates poor control.   Increase Lantus to 34 units daily  Start Humalog 5 units three times a day before meals.      Discussed risks/complications associated with uncontrolled diabetes including organ involvement, heart attack, stroke, death.     Advised lifestyle modifications including attention to diet including the amount and types of carbohydrates consumed and regular activity.      Call for blood sugars less than 70 mg/dl or patterns over 250 mg/dl.      Discussed symptoms and treatment of hypoglycemia.  Reviewed risks associated with hypoglycemia. Always carry rapid acting carbohydrates and a glucometer (a way to check your blood sugar).     Recommendation for medical identification either bracelet, necklace.     Recommendation for glucagon if on insulin. Declined.      Routine follow up for diabetic eye and foot exams.      Ordered blood work to complete prior to next visit.     Send glucose logs/CGM download in 1-2 weeks for review     Follow up in 3 months.   Orders:    Insulin Glargine Solostar (Lantus SoloStar) 100 UNIT/ML SOPN; Inject 0.34 mL (34 Units total) under the skin daily at bedtime INJECT SUBCUTANEOUSLY 26 UNITS  AT BEDTIME    insulin lispro (HumaLOG KwikPen) 100 units/mL injection pen; Inject 5 Units under the skin 3 (three) times a day with meals    Insulin Pen Needle (BD Pen Needle Melany U/F) 32G X 4 MM MISC; Inject under the skin 4 (four) times a day    Hemoglobin A1C; Future    Comprehensive metabolic panel; Future    CBC and differential; Future    Albumin / creatinine urine  ratio; Future    Hyperlipidemia, unspecified hyperlipidemia type  Continues on statin managed by PCP.          Overweight (BMI 25.0-29.9)  Lifestyle modifications including moderate intensity physical activity up to 150 minutes/week with 2-3 days of weight training in addition to carb conscious diet.            Essential hypertension  BP under good control on regimen including ACE.          Mixed hyperlipidemia  Continues on statin managed by PCP.              History of Present Illness     Serafin Pineda is a 59 y.o. male   who presents with a history of type 2 diabetes mellitus with history of neuropathy.     Denies symptomatic hypo/hyperglycemia per report requiring medical attention or third party assistance.     Denies worsening polyuria, polydipsia, or changes in sensation in his feet.      Denies recent hospitalization or illness since last appointment.     UTD eye exam. No vision changes. No retinopathy.      UTD diabetic foot exam.      Home blood glucose readings:   Before breakfast: 199-296 mg/dL  Before bedtime: 219-360 mg/dL     Current regimen:  Lantus 30 units daily     Discontinued mounjaro due to darkened stools and abdominal pain which have since resolved off of the medication.     On ACE/ARB and statin.   Thyroid disorders: None  Pancreatitis: None      Review of Systems as per HPI  Medical History Reviewed by provider this encounter:  Tobacco  Allergies  Meds  Problems  Med Hx  Surg Hx  Fam Hx     .  Current Outpatient Medications on File Prior to Visit   Medication Sig Dispense Refill    ALPRAZolam ER (XANAX XR) 0.5 MG 24 hr tablet if needed      Ronald Microlet Lancets lancets Use to test blood sugar twice a day 200 each 3    Blood Glucose Monitoring Suppl (Contour Next One) KIT Use 2 (two) times a day 1 kit 0    cholecalciferol (VITAMIN D3) 1,000 units tablet Take 1,000 Units by mouth daily      famotidine (PEPCID) 20 mg tablet Take 20 mg by mouth as needed       fexofenadine (ALLEGRA)  "180 MG tablet Take 180 mg by mouth daily at bedtime      glucose 4 g chewable tablet Chew 4 tablets (16 g total) as needed for low blood sugar 100 tablet 1    glucose blood (Contour Next Test) test strip Use as instructed; Check blood sugars twice a day 200 each 3    lisinopril-hydrochlorothiazide (PRINZIDE,ZESTORETIC) 10-12.5 MG per tablet Take 1 tablet by mouth every morning      omeprazole (PriLOSEC) 20 mg delayed release capsule As needed      rosuvastatin (CRESTOR) 10 MG tablet Take 10 mg by mouth 4 (four) times a week M-W-F-[id number]      valACYclovir (VALTREX) 500 mg tablet       [DISCONTINUED] Insulin Glargine Solostar (Lantus SoloStar) 100 UNIT/ML SOPN Inject 0.3 mL (30 Units total) under the skin daily at bedtime INJECT SUBCUTANEOUSLY 26 UNITS  AT BEDTIME 15 mL 2    [DISCONTINUED] Insulin Pen Needle (BD Pen Needle Melany U/F) 32G X 4 MM MISC USE TO ADMINISTER INSULIN ONCE  DAILY 90 each 3    [DISCONTINUED] Tirzepatide (Mounjaro) 10 MG/0.5ML SOAJ INJECT THE CONTENTS OF ONE PEN  SUBCUTANEOUSLY WEEKLY AS  DIRECTED (Patient not taking: Reported on 5/12/2025) 6 mL 1     No current facility-administered medications on file prior to visit.         Medical History Reviewed by provider this encounter:  Tobacco  Allergies  Meds  Problems  Med Hx  Surg Hx  Fam Hx     .    Objective   /63 (BP Location: Right arm, Patient Position: Sitting, Cuff Size: Large)   Pulse 79   Ht 5' 11\" (1.803 m)   Wt 90.4 kg (199 lb 3.2 oz)   SpO2 97%   BMI 27.78 kg/m²      Body mass index is 27.78 kg/m².  Wt Readings from Last 3 Encounters:   05/12/25 90.4 kg (199 lb 3.2 oz)   12/12/24 91.9 kg (202 lb 9.6 oz)   08/28/24 92.1 kg (203 lb)        Physical Exam  Vitals reviewed.   Constitutional:       Appearance: Normal appearance.   Cardiovascular:      Rate and Rhythm: Normal rate and regular rhythm.   Pulmonary:      Effort: Pulmonary effort is normal.   Skin:     General: Skin is warm and dry.   Neurological:      General: No " "focal deficit present.      Mental Status: He is alert and oriented to person, place, and time.   Psychiatric:         Mood and Affect: Mood normal.         Behavior: Behavior normal.       Labs:   Lab Results   Component Value Date    HGBA1C 12.0 (H) 03/31/2025    HGBA1C 10.7 (H) 12/05/2024    HGBA1C 10.6 (H) 08/06/2024     Lab Results   Component Value Date    CREATININE 0.83 03/31/2025    CREATININE 0.86 12/05/2024    CREATININE 1.00 08/06/2024    BUN 15 03/31/2025    K 4.3 03/31/2025    CL 96 03/31/2025    CO2 30 03/31/2025     eGFR   Date Value Ref Range Status   03/31/2025 96 ml/min/1.73sq m Final     Lab Results   Component Value Date    HDL 45 03/31/2025    TRIG 148 03/31/2025     Lab Results   Component Value Date    ALT 58 (H) 03/31/2025    AST 34 03/31/2025    ALKPHOS 92 03/31/2025     Lab Results   Component Value Date    QVP9TWFOJCYP 1.129 08/06/2024     No results found for: \"FREET4\", \"TSI\"    Patient Instructions   Increase Lantus to 34 units daily  Start Humalog 5 units three times a day before meals.     In 1-2 weeks send in blood sugar logs for review or sooner if blood sugar patterns are less than 70 or over 250 mg/dl.     Low blood sugar in people with diabetes   The Basics   Written by the doctors and editors at [facility]   What is low blood sugar? -- This is when the level of sugar in a person's blood gets too low. It is also called \"hypoglycemia.\"  Low blood sugar can cause symptoms ranging from sweating and feeling hungry to passing out.  Low blood sugar can happen in people with diabetes who take certain medicines, including insulin, other medicines given as shots, and some types of pills.  When can people with diabetes get low blood sugar? -- People with diabetes can get low blood sugar when they:   Take too much medicine, including insulin, other medicines given as shots, or certain diabetes pills   Do not eat enough food   Exercise too much without eating a snack or reducing their insulin " "dose   Wait too long between meals   Drink too much alcohol or drink alcohol on an empty stomach  What are the symptoms of low blood sugar? -- The symptoms can be different from person to person, and can change over time. During the early stages of low blood sugar, a person can:   Sweat or tremble   Feel hungry   Feel worried  People who have early symptoms should check their blood sugar level to see if it is low and needs to be treated. If low blood sugar levels are not treated, severe symptoms can occur. These can include:   Trouble walking or feeling weak   Trouble seeing clearly   Being confused or acting in a strange way   Passing out or having a seizure  Some people do not get symptoms during the early stages of low blood sugar. Doctors sometimes call this \"hypoglycemia unawareness.\" People with hypoglycemia unawareness are more likely to have severe symptoms, because they might not know that they have low blood sugar until they have severe symptoms. Hypoglycemia unawareness is more likely in people who:   Have had type 1 diabetes for more than 5 to 10 years   Have frequent episodes of low blood sugar   Use insulin to keep their blood sugar level tightly managed   Are tired   Drink a lot of alcohol   Take certain medicines for high blood pressure or diabetes  How is low blood sugar treated? -- It can be treated with:   Quick sources of sugar - People can eat or drink quick sources of sugar (table 1). Foods that have fat, such as chocolate or cheese, do not treat low blood sugar as quickly. You and a family member should carry a quick source of sugar at all times.   A dose of glucagon - Glucagon is a hormone that can quickly raise blood sugar levels and stop severe symptoms. It comes as a shot (figure 1) or a nose spray. If your doctor recommends that you carry glucagon with you, they will tell you when and how to use it. If possible, it's also a good idea to have a family member, friend, or roommate learn how " to give you glucagon. That way, they can give it to you if you can't do it yourself.  After treating low blood sugar, it is very important to recheck your blood sugar level to make sure that it rises and stays in the normal range. Once your blood sugar is normal, eat a small snack that contains protein, fat, and carbohydrate. This can help keep your blood sugar stable.  What should I do after treatment? -- After treatment for low blood sugar, most people can get back to their usual routine. But your doctor or nurse might recommend that you check your blood sugar level more often during the next 2 to 3 days.  If your low blood sugar was treated with glucagon, call your doctor or nurse. They might change the dose of your diabetes medicine.  How can I prevent low blood sugar? -- The best way is to:   Check your blood sugar levels often - Your doctor or nurse will tell you how and when to check your blood sugar levels at home. They will also tell you what your blood sugar levels should be, and when to treat low blood sugar.   Learn the symptoms of low blood sugar, and be ready to treat it in the early stages. Treating low blood sugar early can prevent severe symptoms.  When should I go to a hospital or call for an ambulance? -- A family member or friend should take you to a hospital or call for an ambulance (in the [place], call 9-1-1) if you:   Are still confused 15 minutes after being treated with a dose of glucagon   Have passed out, and there is no glucagon nearby   Still have low blood sugar after treatment  If you have low blood sugar, do not try to drive yourself to the hospital. Driving with low blood sugar can be dangerous.  All topics are updated as new evidence becomes available and our peer review process is complete.  This topic retrieved from [employer] on: Apr 11, 2024.  Topic 46345 Version 22.0  Release: 32.3.2 - C32.100  © 2024 UpToDate, Inc. and/or its affiliates. All rights reserved.  table 1:  "Quick sources of sugar to treat low blood sugar  3 or 4 glucose tablets   ½ cup of juice or regular soda (not sugar-free)   2 tablespoons of raisins   4 or 5 saltine crackers   1 tablespoon of sugar   1 tablespoon of honey or corn syrup   6 to 8 hard candies   These sources of sugar act quickly to treat low blood sugar levels. People with diabetes who use insulin or certain other diabetes medicines should carry at least 1 of these items at all times.  Graphic 84290 Version 4.0  figure 1: Glucagon autoinjector     Some people carry glucagon in the form of an autoinjector \"pen.\" This makes it easy to give a dose into the upper arm, thigh, or belly.  Graphic 546659 Version 2.0  Consumer Information Use and Disclaimer   Disclaimer: This generalized information is a limited summary of diagnosis, treatment, and/or medication information. It is not meant to be comprehensive and should be used as a tool to help the user understand and/or assess potential diagnostic and treatment options. It does NOT include all information about conditions, treatments, medications, side effects, or risks that may apply to a specific patient. It is not intended to be medical advice or a substitute for the medical advice, diagnosis, or treatment of a health care provider based on the health care provider's examination and assessment of a patient's specific and unique circumstances. Patients must speak with a health care provider for complete information about their health, medical questions, and treatment options, including any risks or benefits regarding use of medications. This information does not endorse any treatments or medications as safe, effective, or approved for treating a specific patient. UpToDate, Inc. and its affiliates disclaim any warranty or liability relating to this information or the use thereof.The use of this information is governed by the Terms of Use, available at " https://www.woltersEveryclickuwer.com/en/know/clinical-effectiveness-terms. 2024© UpToDate, Inc. and its affiliates and/or licensors. All rights reserved.  Copyright   © 2024 UpToDate, Inc. and/or its affiliates. All rights reserved.    Discussed with the patient and all questioned fully answered. He will call me if any problems arise.

## 2025-05-12 NOTE — ASSESSMENT & PLAN NOTE
Lab Results   Component Value Date    HGBA1C 12.0 (H) 03/31/2025     HGA1C above goal-- indicates poor control.   Increase Lantus to 34 units daily  Start Humalog 5 units three times a day before meals.      Discussed risks/complications associated with uncontrolled diabetes including organ involvement, heart attack, stroke, death.     Advised lifestyle modifications including attention to diet including the amount and types of carbohydrates consumed and regular activity.      Call for blood sugars less than 70 mg/dl or patterns over 250 mg/dl.      Discussed symptoms and treatment of hypoglycemia.  Reviewed risks associated with hypoglycemia. Always carry rapid acting carbohydrates and a glucometer (a way to check your blood sugar).     Recommendation for medical identification either bracelet, necklace.     Recommendation for glucagon if on insulin. Declined.      Routine follow up for diabetic eye and foot exams.      Ordered blood work to complete prior to next visit.     Send glucose logs/CGM download in 1-2 weeks for review     Follow up in 3 months.   Orders:    Insulin Glargine Solostar (Lantus SoloStar) 100 UNIT/ML SOPN; Inject 0.34 mL (34 Units total) under the skin daily at bedtime INJECT SUBCUTANEOUSLY 26 UNITS  AT BEDTIME    insulin lispro (HumaLOG KwikPen) 100 units/mL injection pen; Inject 5 Units under the skin 3 (three) times a day with meals    Insulin Pen Needle (BD Pen Needle Melany U/F) 32G X 4 MM MISC; Inject under the skin 4 (four) times a day    Hemoglobin A1C; Future    Comprehensive metabolic panel; Future    CBC and differential; Future    Albumin / creatinine urine ratio; Future

## 2025-05-12 NOTE — ASSESSMENT & PLAN NOTE
Lab Results   Component Value Date    HGBA1C 12.0 (H) 03/31/2025     HGA1C above goal-- indicates poor control.   Increase Lantus to 34 units daily  Start Humalog 5 units three times a day before meals.     Discussed risks/complications associated with uncontrolled diabetes including organ involvement, heart attack, stroke, death.    Advised lifestyle modifications including attention to diet including the amount and types of carbohydrates consumed and regular activity.     Call for blood sugars less than 70 mg/dl or patterns over 250 mg/dl.     Discussed symptoms and treatment of hypoglycemia.  Reviewed risks associated with hypoglycemia. Always carry rapid acting carbohydrates and a glucometer (a way to check your blood sugar).    Recommendation for medical identification either bracelet, necklace.    Recommendation for glucagon if on insulin. Declined.     Routine follow up for diabetic eye and foot exams.     Ordered blood work to complete prior to next visit.    Send glucose logs/CGM download in 1-2 weeks for review    Follow up in 3 months.

## 2025-05-15 ENCOUNTER — TELEPHONE (OUTPATIENT)
Dept: ENDOCRINOLOGY | Facility: CLINIC | Age: 60
End: 2025-05-15

## 2025-05-15 DIAGNOSIS — Z79.4 TYPE 2 DIABETES MELLITUS WITH HYPERGLYCEMIA, WITH LONG-TERM CURRENT USE OF INSULIN (HCC): ICD-10-CM

## 2025-05-15 DIAGNOSIS — E11.65 TYPE 2 DIABETES MELLITUS WITH HYPERGLYCEMIA, WITH LONG-TERM CURRENT USE OF INSULIN (HCC): ICD-10-CM

## 2025-05-15 RX ORDER — INSULIN GLARGINE 100 [IU]/ML
34 INJECTION, SOLUTION SUBCUTANEOUS
Qty: 45 ML | Refills: 2 | Status: SHIPPED | OUTPATIENT
Start: 2025-05-15

## 2025-05-28 NOTE — PROGRESS NOTES
There are no diagnoses linked to this encounter.   No problem-specific Assessment & Plan notes found for this encounter.      HPI    Serafin Pineda presents for consult for hemorrhoid removal.     Last colonoscopy performed on  6/8/2023 by Dr. Brito, with a 5 year recall.     Past Medical History[1]  Past Surgical History[2]  Current Medications[3]  Allergies as of 05/30/2025 - Reviewed 05/12/2025   Allergen Reaction Noted    Bydureon [exenatide] Rash 06/17/2020     Review of Systems  There were no vitals filed for this visit.  Physical Exam       [1]   Past Medical History:  Diagnosis Date    Anesthesia complication     difficulty waking up    Annual physical exam 12/29/2020    Diabetes mellitus (HCC)     Diabetes type 2, uncontrolled     Fatty liver     GERD (gastroesophageal reflux disease)     Hemorrhoid     Hyperlipidemia     Hypertension     Rectal bleeding     Wears glasses     Wears partial dentures     upper   [2]   Past Surgical History:  Procedure Laterality Date    APPENDECTOMY     [3]   Current Outpatient Medications:     ALPRAZolam ER (XANAX XR) 0.5 MG 24 hr tablet, if needed, Disp: , Rfl:     Ronald Microlet Lancets lancets, Use to test blood sugar twice a day, Disp: 200 each, Rfl: 3    Blood Glucose Monitoring Suppl (Contour Next One) KIT, Use 2 (two) times a day, Disp: 1 kit, Rfl: 0    cholecalciferol (VITAMIN D3) 1,000 units tablet, Take 1,000 Units by mouth daily, Disp: , Rfl:     famotidine (PEPCID) 20 mg tablet, Take 20 mg by mouth as needed , Disp: , Rfl:     fexofenadine (ALLEGRA) 180 MG tablet, Take 180 mg by mouth daily at bedtime, Disp: , Rfl:     glucose 4 g chewable tablet, Chew 4 tablets (16 g total) as needed for low blood sugar, Disp: 100 tablet, Rfl: 1    glucose blood (Contour Next Test) test strip, Use as instructed; Check blood sugars twice a day, Disp: 200 each, Rfl: 3    Insulin Glargine Solostar (Lantus SoloStar) 100 UNIT/ML SOPN, Inject 0.34 mL (34 Units total) under the skin  daily at bedtime, Disp: 45 mL, Rfl: 2    insulin lispro (HumaLOG KwikPen) 100 units/mL injection pen, Inject 5 Units under the skin 3 (three) times a day with meals, Disp: 15 mL, Rfl: 0    Insulin Pen Needle (BD Pen Needle Melany U/F) 32G X 4 MM MISC, Inject under the skin 4 (four) times a day, Disp: 400 each, Rfl: 3    lisinopril-hydrochlorothiazide (PRINZIDE,ZESTORETIC) 10-12.5 MG per tablet, Take 1 tablet by mouth every morning, Disp: , Rfl:     omeprazole (PriLOSEC) 20 mg delayed release capsule, As needed, Disp: , Rfl:     rosuvastatin (CRESTOR) 10 MG tablet, Take 10 mg by mouth 4 (four) times a week M-W-F-S, Disp: , Rfl:     valACYclovir (VALTREX) 500 mg tablet, , Disp: , Rfl:

## 2025-05-30 ENCOUNTER — OFFICE VISIT (OUTPATIENT)
Age: 60
End: 2025-05-30
Payer: COMMERCIAL

## 2025-05-30 VITALS — HEIGHT: 71 IN | WEIGHT: 199 LBS | BODY MASS INDEX: 27.86 KG/M2

## 2025-05-30 DIAGNOSIS — K64.1 GRADE II HEMORRHOIDS: Primary | ICD-10-CM

## 2025-05-30 PROCEDURE — 99203 OFFICE O/P NEW LOW 30 MIN: CPT | Performed by: COLON & RECTAL SURGERY

## 2025-05-30 RX ORDER — SODIUM CHLORIDE, SODIUM LACTATE, POTASSIUM CHLORIDE, CALCIUM CHLORIDE 600; 310; 30; 20 MG/100ML; MG/100ML; MG/100ML; MG/100ML
20 INJECTION, SOLUTION INTRAVENOUS CONTINUOUS
OUTPATIENT
Start: 2025-05-30

## 2025-05-30 NOTE — PROGRESS NOTES
Diagnoses and all orders for this visit:    Grade II hemorrhoids       Hemorrhoids  Patient presents for evaluation of hemorrhoidal concerns.  Patient notes he has feeling of excess tissue around the anus.  With this he notes a difficult time cleaning himself with significant burning itching and pain.  Occasional bleeding.  Colonoscopy is up-to-date in 2023.  Examination shows minimal external disease.  Perianal skin excoriation.  Grade 2 prolapsing disease.    We discussed his care.  We discussed rubber band ligation, surgical procedures such as THD and excisional hemorrhoidectomy.  We discussed that unfortunately a fair amount of his symptoms may not necessarily be improved with hemorrhoidectomy.  We will endeavor to treat the surgically correctable component of his disease.  With this, I believe THD is the most reasonable treatment as he does have a prolapsing component but this will minimize the pain be seen with excisional hemorrhoidectomy.  Patient does not wish for intervention.  Plan will be THD.  Risks of anal surgery, including but not limited to pain, bleeding, failure to heal properly, fecal incontinence, persistent or recurrent anal disease, infection, fistula, abscess were reviewed. Questions were answered.      HPI    Serafin Pineda presents for consult for hemorrhoid removal.       Last colonoscopy performed on 6/8/2023 by Dr. Brito, with a 5 year recall.     Past Medical History[1]  Past Surgical History[2]  Current Medications[3]  Allergies as of 05/30/2025 - Reviewed 05/12/2025   Allergen Reaction Noted    Bydureon [exenatide] Rash 06/17/2020     Review of Systems   All other systems reviewed and are negative.    There were no vitals filed for this visit.  Physical Exam  Constitutional:       Appearance: Normal appearance.   HENT:      Head: Normocephalic and atraumatic.      Mouth/Throat:      Mouth: Mucous membranes are moist.     Eyes:      Extraocular Movements: Extraocular movements intact.       Pupils: Pupils are equal, round, and reactive to light.     Abdominal:      General: Abdomen is flat.     Musculoskeletal:         General: Normal range of motion.     Skin:     General: Skin is warm.     Neurological:      General: No focal deficit present.      Mental Status: He is alert and oriented to person, place, and time.     Psychiatric:         Mood and Affect: Mood normal.         Behavior: Behavior normal.         Thought Content: Thought content normal.         Judgment: Judgment normal.              [1]   Past Medical History:  Diagnosis Date    Anesthesia complication     difficulty waking up    Annual physical exam 12/29/2020    Diabetes mellitus (HCC)     Diabetes type 2, uncontrolled     Fatty liver     GERD (gastroesophageal reflux disease)     Hemorrhoid     Hyperlipidemia     Hypertension     Rectal bleeding     Wears glasses     Wears partial dentures     upper   [2]   Past Surgical History:  Procedure Laterality Date    APPENDECTOMY     [3]   Current Outpatient Medications:     ALPRAZolam ER (XANAX XR) 0.5 MG 24 hr tablet, if needed, Disp: , Rfl:     Ronald Microlet Lancets lancets, Use to test blood sugar twice a day, Disp: 200 each, Rfl: 3    Blood Glucose Monitoring Suppl (Contour Next One) KIT, Use 2 (two) times a day, Disp: 1 kit, Rfl: 0    cholecalciferol (VITAMIN D3) 1,000 units tablet, Take 1,000 Units by mouth in the morning., Disp: , Rfl:     famotidine (PEPCID) 20 mg tablet, Take 20 mg by mouth as needed, Disp: , Rfl:     fexofenadine (ALLEGRA) 180 MG tablet, Take 180 mg by mouth daily at bedtime, Disp: , Rfl:     glucose 4 g chewable tablet, Chew 4 tablets (16 g total) as needed for low blood sugar, Disp: 100 tablet, Rfl: 1    glucose blood (Contour Next Test) test strip, Use as instructed; Check blood sugars twice a day, Disp: 200 each, Rfl: 3    Insulin Glargine Solostar (Lantus SoloStar) 100 UNIT/ML SOPN, Inject 0.34 mL (34 Units total) under the skin daily at bedtime, Disp:  45 mL, Rfl: 2    insulin lispro (HumaLOG KwikPen) 100 units/mL injection pen, Inject 5 Units under the skin 3 (three) times a day with meals, Disp: 15 mL, Rfl: 0    Insulin Pen Needle (BD Pen Needle Melany U/F) 32G X 4 MM MISC, Inject under the skin 4 (four) times a day, Disp: 400 each, Rfl: 3    lisinopril-hydrochlorothiazide (PRINZIDE,ZESTORETIC) 10-12.5 MG per tablet, Take 1 tablet by mouth every morning, Disp: , Rfl:     omeprazole (PriLOSEC) 20 mg delayed release capsule, As needed, Disp: , Rfl:     rosuvastatin (CRESTOR) 10 MG tablet, Take 10 mg by mouth 4 (four) times a week M-W-F-S, Disp: , Rfl:     valACYclovir (VALTREX) 500 mg tablet, , Disp: , Rfl:

## 2025-05-30 NOTE — ASSESSMENT & PLAN NOTE
Patient presents for evaluation of hemorrhoidal concerns.  Patient notes he has feeling of excess tissue around the anus.  With this he notes a difficult time cleaning himself with significant burning itching and pain.  Occasional bleeding.  Colonoscopy is up-to-date in 2023.  Examination shows minimal external disease.  Perianal skin excoriation.  Grade 2 prolapsing disease.    We discussed his care.  We discussed rubber band ligation, surgical procedures such as THD and excisional hemorrhoidectomy.  We discussed that unfortunately a fair amount of his symptoms may not necessarily be improved with hemorrhoidectomy.  We will endeavor to treat the surgically correctable component of his disease.  With this, I believe THD is the most reasonable treatment as he does have a prolapsing component but this will minimize the pain be seen with excisional hemorrhoidectomy.  Patient does not wish for intervention.  Plan will be THD.  Risks of anal surgery, including but not limited to pain, bleeding, failure to heal properly, fecal incontinence, persistent or recurrent anal disease, infection, fistula, abscess were reviewed. Questions were answered.

## 2025-06-06 ENCOUNTER — TELEPHONE (OUTPATIENT)
Age: 60
End: 2025-06-06

## 2025-06-06 NOTE — TELEPHONE ENCOUNTER
Patient scheduled for surgery 8/4/2025  at [place]. Instructions and PAT's gone over with and mailed to the patient.     GLP instructions mailed

## 2025-06-06 NOTE — LETTER
Serafin Pineda  [address] [facility] [id number]        ------------------------------------------------------------------------------------------------------------  6/6/2025    Dear Serafin Pineda,    Your surgery is scheduled for: 8/4/2025  The hospital will call the evening prior to your surgery with your expected arrival time.   Location:[place] [id number] [place] [address]    CHECK LIST PRIOR TO OUTPATIENT SURGERY  It is your responsibility to obtain any/all referrals needed for your surgery if required by your insurance. Our office will contact you to discuss your insurance coverage for this procedure.     Special instructions required if you are taking any blood thinners. Please verify with the prescribing physician. Examples include Coumadin, Plavix, Xarelto, Eliquis, Pradaxa, etc.     Please check with your family physician if you are taking the following medications: Aspirin or any Aspirin containing medication, Gingko biloba, Ginseng, Feverfew, and/or [place]’s Wort. We suggest stopping these for 3 days.     The night before and the day of your surgery, wash from your neck to groin with chlorhexidine soap. This soap is available at most retail pharmacies under such brand names as Hibiclens, Endure or Aplicare.     Pre-admission testing Required: YES      NO x            NOTHING TO EAT OR DRINK AFTER MIDNIGHT PRIOR TO SURGERY.     Take ONE FLEET ENEMA one hour prior to leaving for the hospital.     Please do not hesitate to call our office with any questions regarding your surgery.                    Post-Operative Care Information   Hemorrhoidectomy, EUA, Fissurectomy, Fistulotomy, Sphincterotomy      Resume high fiber diet. Fiber supplementation with Metamucil or Konsyl also recommended daily.       Your first bowel movement may take up to 3 days after surgery. THIS IS OFTEN PAINFUL.      While taking narcotic pain medication, you should remain on an  over-the-counter stool softener such as Colace (one tablet twice daily). For constipation Miralax can be taken up to three times daily.     Pain is to be expected after hemorrhoid surgery. Ibuprofen (400? 600MG) every 6?8 hours is an excellent alternative in addition or as a substitute to your narcotic pain medication.     Rectal bleeding or drainage is normal after surgery.       Nausea is a common complaint post op. This can be associated with the narcotic pain medications, anesthesia as well as with severe constipation.     Driving may be resumed when all off all narcotic pain medications and you can turn or twist your body without hesitation.    If you are unable to urinate within 8 hours after surgery, please call the office at [phone]    Frequent warm tub soaks or warm showers are often soothing, we suggest 3 to 4 times daily.    After bowel movements, you may wash with a hand shower or warm tub soak.  No soap is okay.     No strenuous activity (i.e., Running, jogging, swimming, or weightlifting) for up to one week after surgery.     When will I receive follow-up care?      You should be scheduled for a post-op appointment within 6-8 weeks after surgery, unless otherwise instructed on your discharge summary. If you do not have an existing appointment at the time of discharge, please call our office at [phone].    Call the office at [phone] immediately if you have a fever, chills, excessive sweating, difficulty urinating, or excessive/profuse bleeding with clots.         Medicine Instructions for Adults with Diabetes who Need a Bowel Prep       Follow these instructions when a BOWEL PREP is required for your procedure or surgery!    NOTE:   GLP-1 Agonists taken weekly: do not take in the 7 days before your procedure   SGLT-2 Inhibitors: do not take in the 4 days before your procedure     On the Day Before Surgery/Procedure  If you are having a procedure (e.g. Colonoscopy) or surgery that requires  a bowel prep and you may have at least a clear liquid diet, follow the directions below based on the type of medicine you take for your diabetes.     Type of Medicine You Take Examples What to do   Pre-Mixed Insulin - Intermediate Acting Humalog® 75/25, Humulin® 70/30, Novolog® 70/30, Regular Insulin Take ½ your regular dose the evening before your procedure   Rapid/Fast Acting Insulin Humalog® U200, NovoLog®, Apidra®, Fiasp® Take ½ your regular dose the evening before your procedure.   Long-Acting Insulin Lantus®, Levemir®, Tresiba®, Toujeo®, Basaglar® Take your FULL regular dose the day before procedure   Oral Sulfonylurea Glipizide/Glimepiride/Glucotrol® Take ½ your regular dose the evening before your procedure   Other Oral Diabetes Medicines Metformin®, Glucophage®, Glucophage XR®, Riomet®, Glumetza®), Actose®, Avandia®, Glyset®, Prandin® Take your regular dose with dinner in the evening before your procedure   GLP-1 Agonists AdlyxinÒ, ByettaÒ, BydureonÒ, OzempicÒ, SoliquaÒ, TanzeumÒ, TrulicityÒ, VictozaÒ, Saxenda®, Rybelsus® If taken daily, take as normal    If taken weekly, do not take this medicine for 7 days before your procedure including the day of the procedure (resume taking after the procedure)   SGLT-2 Inhibitors Jardiance®, Invokana®, Farxiga®,   Steglatro®, Brenzavvy®, Qtern®, Segluromet®, Glyxambi®, Synjardy®, Synjardy XR®, Invokamet®, Invokamet XR®, Trijary XR®, Xigduo XR®, Steglujan® Do not take for 4 days before your procedure including the day of the procedure (resume taking after the procedure)                More information continued on back                    Medicine Instructions for Adults with Diabetes who Need a Bowel Prep  Page 2      On the Day of Surgery/Procedure  Follow the directions below based on the type of medicine you take for your diabetes.     Type of Medicine You Take Examples What to do   Long-Acting Insulin Lantus®, Levemir®, Tresiba®, Toujeo®, Basaglar®, Semglee®   If  you usually take your Long-Acting Insulin in the morning, take the full dose as scheduled.   GLP-1 Agonists AdlyxinÒ, ByettaÒ, BydureonÒ, OzempicÒ, SoliquaÒ, TanzeumÒ, TrulicityÒ, VictozaÒ, Saxenda®, Rybelsus® Do NOT take this medicine on the day of your procedure (resume taking after the procedure)       On the Day of Surgery/Procedure (continued)  Except for the morning Long-Acting Insulin, DO NOT take ANY diabetic medicine on the day of your procedure unless you were instructed by the doctor who manages your diabetes medicines.    Continue to check your blood sugars.  If you have an insulin pump, ask your endocrinologist for instructions at least 3 days before your procedure. NOTE: If you are not able to ask your endocrinologist in advance, on the day of the procedure set your insulin pump to your basal rate only. Bring your insulin pump supplies to the hospital.     If you have any questions about taking your diabetes medicines prior to your procedure, please contact the doctor who manages your diabetes medicines.

## 2025-06-06 NOTE — TELEPHONE ENCOUNTER
----- Message from Zackery Smith MD sent at 5/30/2025  1:08 PM EDT -----  Please schedule THD at [place]

## 2025-07-29 ENCOUNTER — ANESTHESIA EVENT (OUTPATIENT)
Dept: ANESTHESIOLOGY | Facility: HOSPITAL | Age: 60
End: 2025-07-29

## 2025-07-29 ENCOUNTER — APPOINTMENT (OUTPATIENT)
Dept: LAB | Facility: CLINIC | Age: 60
End: 2025-07-29
Payer: COMMERCIAL

## 2025-07-29 ENCOUNTER — ANESTHESIA (OUTPATIENT)
Dept: ANESTHESIOLOGY | Facility: HOSPITAL | Age: 60
End: 2025-07-29

## 2025-07-29 DIAGNOSIS — Z79.4 TYPE 2 DIABETES MELLITUS WITH HYPERGLYCEMIA, WITH LONG-TERM CURRENT USE OF INSULIN (HCC): ICD-10-CM

## 2025-07-29 DIAGNOSIS — E11.65 TYPE 2 DIABETES MELLITUS WITH HYPERGLYCEMIA, WITH LONG-TERM CURRENT USE OF INSULIN (HCC): ICD-10-CM

## 2025-07-29 LAB
ALBUMIN SERPL BCG-MCNC: 4.2 G/DL (ref 3.5–5)
ALP SERPL-CCNC: 91 U/L (ref 34–104)
ALT SERPL W P-5'-P-CCNC: 51 U/L (ref 7–52)
ANION GAP SERPL CALCULATED.3IONS-SCNC: 9 MMOL/L (ref 4–13)
AST SERPL W P-5'-P-CCNC: 33 U/L (ref 13–39)
BASOPHILS # BLD MANUAL: 0.19 THOUSAND/UL (ref 0–0.1)
BASOPHILS NFR MAR MANUAL: 3 % (ref 0–1)
BILIRUB SERPL-MCNC: 1.52 MG/DL (ref 0.2–1)
BUN SERPL-MCNC: 14 MG/DL (ref 5–25)
CALCIUM SERPL-MCNC: 9.6 MG/DL (ref 8.4–10.2)
CHLORIDE SERPL-SCNC: 97 MMOL/L (ref 96–108)
CO2 SERPL-SCNC: 30 MMOL/L (ref 21–32)
CREAT SERPL-MCNC: 0.79 MG/DL (ref 0.6–1.3)
CREAT UR-MCNC: 126.8 MG/DL
EOSINOPHIL # BLD MANUAL: 0.26 THOUSAND/UL (ref 0–0.4)
EOSINOPHIL NFR BLD MANUAL: 4 % (ref 0–6)
ERYTHROCYTE [DISTWIDTH] IN BLOOD BY AUTOMATED COUNT: 12.2 % (ref 11.6–15.1)
EST. AVERAGE GLUCOSE BLD GHB EST-MCNC: 355 MG/DL
GFR SERPL CREATININE-BSD FRML MDRD: 98 ML/MIN/1.73SQ M
GLUCOSE P FAST SERPL-MCNC: 320 MG/DL (ref 65–99)
HBA1C MFR BLD: 14 %
HCT VFR BLD AUTO: 47.9 % (ref 36.5–49.3)
HGB BLD-MCNC: 16.6 G/DL (ref 12–17)
LYMPHOCYTES # BLD AUTO: 2.18 THOUSAND/UL (ref 0.6–4.47)
LYMPHOCYTES # BLD AUTO: 30 % (ref 14–44)
MCH RBC QN AUTO: 32.8 PG (ref 26.8–34.3)
MCHC RBC AUTO-ENTMCNC: 34.7 G/DL (ref 31.4–37.4)
MCV RBC AUTO: 95 FL (ref 82–98)
MICROALBUMIN UR-MCNC: 60.7 MG/L
MICROALBUMIN/CREAT 24H UR: 48 MG/G CREATININE (ref 0–30)
MONOCYTES # BLD AUTO: 0.83 THOUSAND/UL (ref 0–1.22)
MONOCYTES NFR BLD: 13 % (ref 4–12)
NEUTROPHILS # BLD MANUAL: 2.95 THOUSAND/UL (ref 1.85–7.62)
NEUTS SEG NFR BLD AUTO: 46 % (ref 43–75)
PLATELET # BLD AUTO: 227 THOUSANDS/UL (ref 149–390)
PLATELET BLD QL SMEAR: ADEQUATE
PMV BLD AUTO: 12 FL (ref 8.9–12.7)
POTASSIUM SERPL-SCNC: 4.8 MMOL/L (ref 3.5–5.3)
PROT SERPL-MCNC: 7 G/DL (ref 6.4–8.4)
RBC # BLD AUTO: 5.06 MILLION/UL (ref 3.88–5.62)
RBC MORPH BLD: NORMAL
SODIUM SERPL-SCNC: 136 MMOL/L (ref 135–147)
VARIANT LYMPHS # BLD AUTO: 4 %
WBC # BLD AUTO: 6.41 THOUSAND/UL (ref 4.31–10.16)

## 2025-07-29 PROCEDURE — 83036 HEMOGLOBIN GLYCOSYLATED A1C: CPT

## 2025-07-29 PROCEDURE — 80053 COMPREHEN METABOLIC PANEL: CPT

## 2025-07-29 PROCEDURE — 36415 COLL VENOUS BLD VENIPUNCTURE: CPT

## 2025-07-29 PROCEDURE — 85007 BL SMEAR W/DIFF WBC COUNT: CPT

## 2025-07-29 PROCEDURE — 82043 UR ALBUMIN QUANTITATIVE: CPT

## 2025-07-29 PROCEDURE — 82570 ASSAY OF URINE CREATININE: CPT

## 2025-07-29 PROCEDURE — 85027 COMPLETE CBC AUTOMATED: CPT

## 2025-08-01 DIAGNOSIS — Z79.4 TYPE 2 DIABETES MELLITUS WITH HYPERGLYCEMIA, WITH LONG-TERM CURRENT USE OF INSULIN (HCC): ICD-10-CM

## 2025-08-01 DIAGNOSIS — E11.65 TYPE 2 DIABETES MELLITUS WITH HYPERGLYCEMIA, WITH LONG-TERM CURRENT USE OF INSULIN (HCC): ICD-10-CM

## 2025-08-01 RX ORDER — PEN NEEDLE, DIABETIC 32GX 5/32"
NEEDLE, DISPOSABLE MISCELLANEOUS 4 TIMES DAILY
Qty: 400 EACH | Refills: 3 | Status: SHIPPED | OUTPATIENT
Start: 2025-08-01

## 2025-08-07 DIAGNOSIS — E11.65 TYPE 2 DIABETES MELLITUS WITH HYPERGLYCEMIA, WITH LONG-TERM CURRENT USE OF INSULIN (HCC): ICD-10-CM

## 2025-08-07 DIAGNOSIS — Z79.4 TYPE 2 DIABETES MELLITUS WITH HYPERGLYCEMIA, WITH LONG-TERM CURRENT USE OF INSULIN (HCC): ICD-10-CM

## 2025-08-07 RX ORDER — INSULIN LISPRO 100 [IU]/ML
5 INJECTION, SOLUTION INTRAVENOUS; SUBCUTANEOUS
Qty: 15 ML | Refills: 0 | Status: SHIPPED | OUTPATIENT
Start: 2025-08-07

## 2025-08-19 ENCOUNTER — TELEPHONE (OUTPATIENT)
Dept: ENDOCRINOLOGY | Facility: CLINIC | Age: 60
End: 2025-08-19

## 2025-08-19 DIAGNOSIS — E11.65 TYPE 2 DIABETES MELLITUS WITH HYPERGLYCEMIA, WITH LONG-TERM CURRENT USE OF INSULIN (HCC): ICD-10-CM

## 2025-08-19 DIAGNOSIS — Z79.4 TYPE 2 DIABETES MELLITUS WITH HYPERGLYCEMIA, WITH LONG-TERM CURRENT USE OF INSULIN (HCC): ICD-10-CM

## 2025-08-20 RX ORDER — PEN NEEDLE, DIABETIC 32GX 5/32"
NEEDLE, DISPOSABLE MISCELLANEOUS 4 TIMES DAILY
Qty: 400 EACH | Refills: 3 | Status: SHIPPED | OUTPATIENT
Start: 2025-08-20